# Patient Record
Sex: FEMALE | Race: WHITE | Employment: OTHER | URBAN - METROPOLITAN AREA
[De-identification: names, ages, dates, MRNs, and addresses within clinical notes are randomized per-mention and may not be internally consistent; named-entity substitution may affect disease eponyms.]

---

## 2019-07-13 ENCOUNTER — HOSPITAL ENCOUNTER (OUTPATIENT)
Age: 64
Discharge: HOME | End: 2019-07-13
Attending: FAMILY MEDICINE
Payer: MEDICARE

## 2019-07-13 VITALS
HEART RATE: 64 BPM | RESPIRATION RATE: 18 BRPM | WEIGHT: 130 LBS | SYSTOLIC BLOOD PRESSURE: 102 MMHG | DIASTOLIC BLOOD PRESSURE: 63 MMHG | HEIGHT: 64 IN | TEMPERATURE: 98 F | OXYGEN SATURATION: 100 % | BODY MASS INDEX: 22.2 KG/M2

## 2019-07-13 DIAGNOSIS — N30.01 ACUTE CYSTITIS WITH HEMATURIA: Primary | ICD-10-CM

## 2019-07-13 LAB
BILIRUB UR QL STRIP: NEGATIVE
COLOR UR: YELLOW
GLUCOSE UR STRIP-MCNC: NEGATIVE MG/DL
KETONES UR STRIP-MCNC: NEGATIVE MG/DL
NITRITE UR QL STRIP: POSITIVE
PH UR STRIP: 5.5 [PH]
PROT UR STRIP-MCNC: 100 MG/DL
SP GR UR STRIP: 1.01
UROBILINOGEN UR STRIP-ACNC: <2 MG/DL

## 2019-07-13 PROCEDURE — 87186 SC STD MICRODIL/AGAR DIL: CPT | Performed by: FAMILY MEDICINE

## 2019-07-13 PROCEDURE — 81002 URINALYSIS NONAUTO W/O SCOPE: CPT

## 2019-07-13 PROCEDURE — 87086 URINE CULTURE/COLONY COUNT: CPT | Performed by: FAMILY MEDICINE

## 2019-07-13 PROCEDURE — 87088 URINE BACTERIA CULTURE: CPT | Performed by: FAMILY MEDICINE

## 2019-07-13 PROCEDURE — 99204 OFFICE O/P NEW MOD 45 MIN: CPT

## 2019-07-13 RX ORDER — CEPHALEXIN 500 MG/1
500 CAPSULE ORAL 2 TIMES DAILY
Qty: 14 CAPSULE | Refills: 0 | Status: SHIPPED | OUTPATIENT
Start: 2019-07-13 | End: 2019-07-20

## 2019-07-13 RX ORDER — LEVOTHYROXINE SODIUM 137 UG/1
TABLET ORAL
Refills: 1 | COMMUNITY
Start: 2019-06-04 | End: 2019-10-29

## 2019-07-13 RX ORDER — AZELASTINE 1 MG/ML
SPRAY, METERED NASAL
Refills: 0 | COMMUNITY
Start: 2019-05-24 | End: 2021-05-18 | Stop reason: ALTCHOICE

## 2019-07-13 RX ORDER — KETOCONAZOLE 20 MG/ML
SHAMPOO TOPICAL
Refills: 0 | COMMUNITY
Start: 2019-06-07 | End: 2019-08-12

## 2019-07-13 RX ORDER — GABAPENTIN 100 MG/1
CAPSULE ORAL
Refills: 0 | COMMUNITY
Start: 2019-02-14 | End: 2020-06-09 | Stop reason: ALTCHOICE

## 2019-07-13 RX ORDER — ROPINIROLE 0.25 MG/1
TABLET, FILM COATED ORAL
Refills: 0 | COMMUNITY
Start: 2019-01-21 | End: 2020-06-09 | Stop reason: ALTCHOICE

## 2019-07-13 RX ORDER — CLOBETASOL PROPIONATE 0.5 MG/G
AEROSOL, FOAM TOPICAL
Refills: 0 | COMMUNITY
Start: 2019-03-26 | End: 2019-11-16

## 2019-07-13 RX ORDER — FLUOCINONIDE 0.5 MG/ML
SOLUTION TOPICAL
Refills: 0 | COMMUNITY
Start: 2019-06-21 | End: 2021-03-30

## 2019-07-13 RX ORDER — ESTRADIOL 10 UG/1
INSERT VAGINAL
Refills: 1 | COMMUNITY
Start: 2019-05-31 | End: 2020-06-09 | Stop reason: ALTCHOICE

## 2019-07-13 NOTE — ED PROVIDER NOTES
Patient Seen in: [address]    History   Patient presents with:  Urinary Symptoms (urologic)    Stated Complaint: Discomfort during urination and urine has different odor     HPI    59year old Female patient presents with Take 10 mg by mouth 3 (three) times daily. OxyCODONE HCl (ROXICODONE) 10 MG Oral Tab,  Take 10 mg by mouth every 4 (four) hours as needed for Pain. LORazepam (ATIVAN) 1 MG Oral Tab,  Take 1 mg by mouth every 4 (four) hours as needed for Anxiety.    onda Nitrite Urine Positive (*)     Leukocyte esterase urine Large (*)     All other components within normal limits     MDM         Started on Keflex 500 bidx 7 days.  Patient verbalizes agreement and understanding of the plan and management as well as warning

## 2019-07-13 NOTE — ED INITIAL ASSESSMENT (HPI)
Pt in IC by self c/o burning on urination and foul odor for 1-2 days. Denied fever, low back pain, pelvic pressure. Reports history of injury to pelvic floor, diagnosed with vaginal stricter.

## 2019-08-05 ENCOUNTER — HOSPITAL ENCOUNTER (OUTPATIENT)
Dept: MAMMOGRAPHY | Facility: HOSPITAL | Age: 64
Discharge: HOME | End: 2019-08-05
Attending: OBSTETRICS & GYNECOLOGY
Payer: MEDICARE

## 2019-08-05 ENCOUNTER — HOSPITAL ENCOUNTER (OUTPATIENT)
Dept: ULTRASOUND IMAGING | Facility: HOSPITAL | Age: 64
Discharge: HOME | End: 2019-08-05
Attending: OBSTETRICS & GYNECOLOGY
Payer: MEDICARE

## 2019-08-05 DIAGNOSIS — N64.4 MASTODYNIA: ICD-10-CM

## 2019-08-05 PROCEDURE — 77066 DX MAMMO INCL CAD BI: CPT | Performed by: OBSTETRICS & GYNECOLOGY

## 2019-08-05 PROCEDURE — 76642 ULTRASOUND BREAST LIMITED: CPT | Performed by: OBSTETRICS & GYNECOLOGY

## 2019-08-05 PROCEDURE — 77062 BREAST TOMOSYNTHESIS BI: CPT | Performed by: OBSTETRICS & GYNECOLOGY

## 2019-08-12 ENCOUNTER — OFFICE VISIT (OUTPATIENT)
Dept: INTERNAL MEDICINE CLINIC | Facility: CLINIC | Age: 64
End: 2019-08-12
Payer: MEDICARE

## 2019-08-12 VITALS
BODY MASS INDEX: 22.36 KG/M2 | DIASTOLIC BLOOD PRESSURE: 72 MMHG | SYSTOLIC BLOOD PRESSURE: 118 MMHG | WEIGHT: 131 LBS | HEIGHT: 64 IN | RESPIRATION RATE: 16 BRPM

## 2019-08-12 DIAGNOSIS — M54.41 CHRONIC RIGHT-SIDED LOW BACK PAIN WITH RIGHT-SIDED SCIATICA: ICD-10-CM

## 2019-08-12 DIAGNOSIS — G89.29 CHRONIC RIGHT-SIDED LOW BACK PAIN WITH RIGHT-SIDED SCIATICA: ICD-10-CM

## 2019-08-12 DIAGNOSIS — G50.0 TRIGEMINAL NEURALGIA: ICD-10-CM

## 2019-08-12 DIAGNOSIS — E03.9 ACQUIRED HYPOTHYROIDISM: Primary | ICD-10-CM

## 2019-08-12 PROCEDURE — G0463 HOSPITAL OUTPT CLINIC VISIT: HCPCS | Performed by: INTERNAL MEDICINE

## 2019-08-12 PROCEDURE — 99204 OFFICE O/P NEW MOD 45 MIN: CPT | Performed by: INTERNAL MEDICINE

## 2019-08-12 RX ORDER — ACETAMINOPHEN 160 MG
2 TABLET,DISINTEGRATING ORAL DAILY
COMMUNITY

## 2019-08-12 RX ORDER — LEVOTHYROXINE SODIUM 0.12 MG/1
125 TABLET ORAL
Qty: 90 TABLET | Refills: 1 | Status: SHIPPED | OUTPATIENT
Start: 2019-08-12 | End: 2019-10-29

## 2019-08-12 RX ORDER — SUMATRIPTAN 5 MG/1
SPRAY NASAL EVERY 2 HOUR PRN
COMMUNITY
End: 2020-01-08

## 2019-08-12 RX ORDER — MELOXICAM 15 MG/1
15 TABLET ORAL DAILY
COMMUNITY
End: 2020-06-09 | Stop reason: ALTCHOICE

## 2019-08-12 RX ORDER — ESTRADIOL AND NORETHINDRONE ACETATE .5; .1 MG/1; MG/1
1 TABLET ORAL DAILY
COMMUNITY
End: 2020-07-02

## 2019-08-12 RX ORDER — KETOCONAZOLE 20 MG/ML
10 SHAMPOO TOPICAL WEEKLY
Qty: 120 ML | Refills: 3 | Status: SHIPPED | OUTPATIENT
Start: 2019-08-12 | End: 2020-04-06

## 2019-08-12 RX ORDER — NADOLOL 40 MG/1
40 TABLET ORAL DAILY
COMMUNITY
End: 2020-06-09 | Stop reason: ALTCHOICE

## 2019-08-12 RX ORDER — IPRATROPIUM BROMIDE 42 UG/1
2 SPRAY, METERED NASAL 4 TIMES DAILY
COMMUNITY
End: 2020-04-22

## 2019-08-12 NOTE — PROGRESS NOTES
Kimberly Solis is a 59year old female. HPI:     1. Low Back pain    Had injury to back and had steroid injections. Which did not do much to help the pain she was experiencing. Has been seeing PT for 3 months and is feeling some better.  Has had several mouth 2 (two) times daily as needed for Heartburn. take 1 tablet (40MG)  by oral route 2 times every day Disp: 60 tablet Rfl: 11   metRONIDAZOLE (METROCREAM) 0.75 % Apply Externally Cream Apply  topically 2 (two) times daily.  Disp:  Rfl:    baclofen (Tess Yoo heartburn  NEURO: denies headaches    EXAM:   /72 (BP Location: Left arm, Patient Position: Sitting, Cuff Size: adult)   Resp 16   Ht 5' 4\" (1.626 m)   Wt 131 lb (59.4 kg)   BMI 22.49 kg/m²   GENERAL: well developed, well nourished,in no apparent di

## 2019-08-22 ENCOUNTER — MED REC SCAN ONLY (OUTPATIENT)
Dept: INTERNAL MEDICINE CLINIC | Facility: CLINIC | Age: 64
End: 2019-08-22

## 2019-10-15 ENCOUNTER — TELEPHONE (OUTPATIENT)
Dept: INTERNAL MEDICINE CLINIC | Facility: CLINIC | Age: 64
End: 2019-10-15

## 2019-10-15 DIAGNOSIS — E03.9 ACQUIRED HYPOTHYROIDISM: Primary | ICD-10-CM

## 2019-10-15 RX ORDER — LORAZEPAM 1 MG/1
1 TABLET ORAL 3 TIMES DAILY
Qty: 90 TABLET | Refills: 0 | Status: SHIPPED | OUTPATIENT
Start: 2019-10-15 | End: 2019-11-26

## 2019-10-15 NOTE — TELEPHONE ENCOUNTER
Patient is requesting a refill of her medication LORazepam (ATIVAN) 1 MG Oral Tab. Patient states no longer has any tablets left and the medication was prescribed by her past PCP Dr. Philippe Manriquez.  Patient states she is currently taking Diazepam and only ha

## 2019-10-15 NOTE — TELEPHONE ENCOUNTER
Action Requested: Summary for Provider     []  Critical Lab, Recommendations Needed  [] Need Additional Advice  []   FYI    [x]   Need Orders  [] Need Medications Sent to Pharmacy  []  Other     SUMMARY: Dr Malorie Paulino, please see pending order TSH w Reflex.

## 2019-10-28 ENCOUNTER — APPOINTMENT (OUTPATIENT)
Dept: LAB | Facility: HOSPITAL | Age: 64
End: 2019-10-28
Attending: PHYSICIAN ASSISTANT
Payer: MEDICARE

## 2019-10-28 DIAGNOSIS — E03.9 ACQUIRED HYPOTHYROIDISM: ICD-10-CM

## 2019-10-28 PROCEDURE — 36415 COLL VENOUS BLD VENIPUNCTURE: CPT

## 2019-10-28 PROCEDURE — 84443 ASSAY THYROID STIM HORMONE: CPT

## 2019-10-29 ENCOUNTER — OFFICE VISIT (OUTPATIENT)
Dept: INTERNAL MEDICINE CLINIC | Facility: CLINIC | Age: 64
End: 2019-10-29
Payer: MEDICARE

## 2019-10-29 VITALS
BODY MASS INDEX: 22.88 KG/M2 | RESPIRATION RATE: 16 BRPM | SYSTOLIC BLOOD PRESSURE: 132 MMHG | DIASTOLIC BLOOD PRESSURE: 73 MMHG | HEIGHT: 64 IN | HEART RATE: 99 BPM | WEIGHT: 134 LBS

## 2019-10-29 DIAGNOSIS — E03.9 ACQUIRED HYPOTHYROIDISM: ICD-10-CM

## 2019-10-29 DIAGNOSIS — G50.0 TRIGEMINAL NEURALGIA: ICD-10-CM
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
## 2019-10-29 DIAGNOSIS — Z23 NEED FOR VACCINATION: ICD-10-CM

## 2019-10-29 PROCEDURE — 99214 OFFICE O/P EST MOD 30 MIN: CPT | Performed by: INTERNAL MEDICINE

## 2019-10-29 PROCEDURE — G0008 ADMIN INFLUENZA VIRUS VAC: HCPCS | Performed by: INTERNAL MEDICINE

## 2019-10-29 PROCEDURE — 90686 IIV4 VACC NO PRSV 0.5 ML IM: CPT | Performed by: INTERNAL MEDICINE

## 2019-10-29 PROCEDURE — G0463 HOSPITAL OUTPT CLINIC VISIT: HCPCS | Performed by: INTERNAL MEDICINE

## 2019-10-29 RX ORDER — LEVOTHYROXINE SODIUM 0.1 MG/1
100 TABLET ORAL
Qty: 90 TABLET | Refills: 3 | Status: SHIPPED | OUTPATIENT
Start: 2019-10-29 | End: 2020-10-09

## 2019-10-29 RX ORDER — LEVOTHYROXINE SODIUM 0.1 MG/1
100 TABLET ORAL
COMMUNITY
End: 2019-10-29
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
## 2019-11-15 NOTE — TELEPHONE ENCOUNTER
Tried calling patient, but voice mail full-cannot leave any messages,. No MyChart. Called Kayla  (friend-see demographic) and told her if she can call patient and let her know to call us back, as voice mail is full.

## 2019-11-15 NOTE — TELEPHONE ENCOUNTER
Please triage symptoms associated with this refill as Last RF was 2018. Review pended refill request as it does not fall under a protocol.     Last Rx: 01/24/18  LOV: 10/29/19

## 2019-11-15 NOTE — TELEPHONE ENCOUNTER
Spoke with patient for clarification of medication request:    Patient states she has psoriasis on scalp and nails--does use both Fluocinonide solution and Clobetasol foam, depending on the weather.  She does understand her insurance prefers she use Fluocin

## 2019-11-16 RX ORDER — CLOBETASOL PROPIONATE 0.5 MG/G
AEROSOL, FOAM TOPICAL
Qty: 150 G | Refills: 2 | Status: SHIPPED | OUTPATIENT
Start: 2019-11-16 | End: 2020-12-03

## 2019-11-26 RX ORDER — LORAZEPAM 1 MG/1
1 TABLET ORAL 3 TIMES DAILY
Qty: 90 TABLET | Refills: 0 | Status: SHIPPED | OUTPATIENT
Start: 2019-11-26 | End: 2020-01-08

## 2019-11-26 NOTE — TELEPHONE ENCOUNTER
Controlled medication pending for review. Please change to phone in, fax, or print script if not being sent electronically.     Last Rx:10/15/19  LOV: Recent Visits  Date Type Provider Dept   10/29/19 Office Visit Zenaida Marcum MD [facility]

## 2019-11-26 NOTE — TELEPHONE ENCOUNTER
Per pt she has already phoned the pharmacy for her medication request. Pt is out of medication and would like a refill on lorazepam. Pharmacy: [facility].      Current Outpatient Medications   Medication Sig Dispense Refill   • LORazepam

## 2019-11-29 ENCOUNTER — TELEPHONE (OUTPATIENT)
Dept: INTERNAL MEDICINE CLINIC | Facility: CLINIC | Age: 64
End: 2019-11-29

## 2019-11-29 NOTE — TELEPHONE ENCOUNTER
[address] office spoke Otis Guajardo to check if hard script for lorazepam is available. Delma Davis office staff states office did not receive hard script & shows was sent to pharmacy on file. [address] [facility] spoke with Rabia William who states prescription is still pended. Denies receiving hard script. Lorazepam has been approved as printed script into [facility] by this RN and it will be ready for . Spoke with pt at 11:03 am states she never received paper script for lorazepam. Informed pt that prescription will be ready for  (confirmed pharmacy on file).

## 2019-11-29 NOTE — TELEPHONE ENCOUNTER
PA for Clobetasol Propionate 0.05% foam completed with [id number] via CMM response time 3-5 business days KEY [id number].

## 2019-11-29 NOTE — TELEPHONE ENCOUNTER
received call from [employer]. Patient states on Wednesday when a refill request for Lorazepam was requested no one told her that it would not be called into Pharmacy. She states she was not advised that she would have to  a hard copy script for medicine. Patient states she does not have enough medicine to wait until Dr. Rochelle Montiel is back in on Monday because she will go through withdraw. Please advise. Please call patient - unavailable from 10:00 am - 11:00 am by telephone.

## 2019-11-29 NOTE — TELEPHONE ENCOUNTER
Patient is requesting a refill for her Lorazepam.      Current Outpatient Medications   Medication Sig Dispense Refill   • LORazepam 1 MG Oral Tab Take 1 tablet (1 mg total) by mouth 3 (three) times daily.  As needed for anxiety 90 tablet 0   • CLOBETASOL P

## 2019-12-04 NOTE — TELEPHONE ENCOUNTER
Spoke to [facility] at [facility], medication has not been picked up. [facility] will process medication for patient, will have it ready for .      Spoke to patient, informed of approval.    Outcome   Approved on November 29   Details of this decision are pro

## 2019-12-12 ENCOUNTER — OFFICE VISIT (OUTPATIENT)
Dept: INTERNAL MEDICINE CLINIC | Facility: CLINIC | Age: 64
End: 2019-12-12
Payer: MEDICARE

## 2019-12-12 VITALS
DIASTOLIC BLOOD PRESSURE: 85 MMHG | HEART RATE: 82 BPM | WEIGHT: 131 LBS | RESPIRATION RATE: 16 BRPM | SYSTOLIC BLOOD PRESSURE: 122 MMHG | BODY MASS INDEX: 22.36 KG/M2 | HEIGHT: 64 IN

## 2019-12-12 DIAGNOSIS — N30.10 INTERSTITIAL CYSTITIS: ICD-10-CM

## 2019-12-12 DIAGNOSIS — M35.09 SJOGREN'S SYNDROME WITH OTHER ORGAN INVOLVEMENT (HCC): ICD-10-CM

## 2019-12-12 DIAGNOSIS — E03.9 ACQUIRED HYPOTHYROIDISM: ICD-10-CM

## 2019-12-12 DIAGNOSIS — Z00.00 PHYSICAL EXAM, ANNUAL: Primary | ICD-10-CM

## 2019-12-12 DIAGNOSIS — M32.9 LUPUS (HCC): ICD-10-CM

## 2019-12-12 DIAGNOSIS — Z23 NEED FOR VACCINATION: ICD-10-CM

## 2019-12-12 PROBLEM — M35.00 SJOGREN'S DISEASE (HCC): Status: ACTIVE | Noted: 2019-12-12

## 2019-12-12 PROBLEM — IMO0002 LUPUS: Status: ACTIVE | Noted: 2019-12-12

## 2019-12-12 PROCEDURE — G0439 PPPS, SUBSEQ VISIT: HCPCS | Performed by: INTERNAL MEDICINE

## 2019-12-12 PROCEDURE — 90670 PCV13 VACCINE IM: CPT | Performed by: INTERNAL MEDICINE

## 2019-12-12 PROCEDURE — G0009 ADMIN PNEUMOCOCCAL VACCINE: HCPCS | Performed by: INTERNAL MEDICINE

## 2019-12-12 RX ORDER — FOLIC ACID 1 MG/1
1 TABLET ORAL
COMMUNITY
Start: 2019-11-18 | End: 2021-05-18 | Stop reason: ALTCHOICE

## 2019-12-12 RX ORDER — TIZANIDINE 4 MG/1
4 TABLET ORAL EVERY 6 HOURS PRN
COMMUNITY
End: 2020-07-10

## 2019-12-12 NOTE — PROGRESS NOTES
REASON FOR VISIT:    Tyler Chaves is a 62year old female who presents for a Medicare Annual Wellness visit.      Patient Care Team: Patient Care Team:  Carmen Rodriguez MD as PCP - General (Internal Medicine)    Patient Active Problem List:     Dione Rivera topically 2 (two) times daily. • OxyCODONE HCl (ROXICODONE) 10 MG Oral Tab Take 10 mg by mouth every 4 (four) hours as needed for Pain.      • ondansetron (ZOFRAN-ODT) 4 MG Oral Tablet Dispersible Take 4 mg by mouth every 8 (eight) hours as needed for N medications as prescribed: Able without help  Are you able to afford your medications?: Yes  Hearing Problems?: No     Functional Status     Hearing Problems?: No  Vision Problems? : No  Difficulty walking?: Yes  Difficulty dressing or bathing?: Yes  Probl 09/23/2014 82      Cardiovascular Disease Screening    LDL Annually LDL Cholesterol (mg/dL)   Date Value   09/23/2014 90       EKG - w/ Initial Preventative Physical Exam only, or if medically necessary    Colorectal Cancer Screening     Colonoscopy Scre Brother         alcoholism   • Depression Other         family h/o     Immunization History      Immunization History  Administered            Date(s) Administered    FLULAVAL 6 months & older 0.5 ml Prefilled syringe ([id number])                          10/29 deferred  MUSCULOSKELETAL: back is not tender, FROM of the back  EXTREMITIES: no cyanosis, clubbing or edema  NEURO: Oriented times three, cranial nerves are intact, motor and sensory are grossly intact      ASSESSMENT AND OTHER RELEVANT CHRONIC CONDITIONS

## 2020-01-03 ENCOUNTER — TELEPHONE (OUTPATIENT)
Dept: INTERNAL MEDICINE CLINIC | Facility: CLINIC | Age: 65
End: 2020-01-03

## 2020-01-03 NOTE — TELEPHONE ENCOUNTER
Patient requesting phone call to discuss her Trigeminal neuralgia medication issues before making appointment. Provided e-mail just in case     [email or web address]

## 2020-01-03 NOTE — TELEPHONE ENCOUNTER
Spoke with patient at length. States she has been struggling with trigeminal neuralgia since 2014. She is seeking a doctor who will prescribe opiates to help with break-through pain.  States she has seen multiple pain management doctors who will prescribe o

## 2020-01-03 NOTE — TELEPHONE ENCOUNTER
Patient called back and is returning a call. Patient states she spoke with pain management and they stated they do not treat Trigeminal neuralgia or even prescribe medication for this type of pain. They only do anesthesia intervention.  Patient wanting to s

## 2020-01-03 NOTE — TELEPHONE ENCOUNTER
I tried calling pt, no answer and does not use [employer].  Please try calling her again to see what she needs regarding her trigeminal neuralgia thanks

## 2020-01-07 NOTE — TELEPHONE ENCOUNTER
Followed up with patient, advised of treatment recommendations. She may consider going to pain clinic, will call back for number if she moves forward with this.  Her main concern is in the past has seen pain management providers but they did not help with l

## 2020-01-07 NOTE — TELEPHONE ENCOUNTER
Please let pt know [facility] is not aware of any specific providers who treat this. She may look out of [facility]. We also have the integrative medicine clinic in [place] she can try to see if they have treatments for this.

## 2020-01-07 NOTE — TELEPHONE ENCOUNTER
Please call pt and let her know that Dr. Bert Rowland will not manage long term pain and this is best deferred to pain management.

## 2020-01-07 NOTE — TELEPHONE ENCOUNTER
Followed up with patient, advised of treatment recommendations. Patient reports has decided to move forward with seeing [facility], was recommended in the past also by other providers.

## 2020-01-08 ENCOUNTER — PATIENT MESSAGE (OUTPATIENT)
Dept: INTERNAL MEDICINE CLINIC | Facility: CLINIC | Age: 65
End: 2020-01-08

## 2020-01-08 RX ORDER — SUMATRIPTAN 5 MG/1
1 SPRAY NASAL EVERY 2 HOUR PRN
Qty: 1 EACH | Refills: 1 | Status: SHIPPED | OUTPATIENT
Start: 2020-01-08 | End: 2020-06-05

## 2020-01-08 RX ORDER — LORAZEPAM 1 MG/1
1 TABLET ORAL 3 TIMES DAILY
Qty: 90 TABLET | Refills: 1 | Status: SHIPPED | OUTPATIENT
Start: 2020-01-08 | End: 2020-03-15

## 2020-01-08 NOTE — TELEPHONE ENCOUNTER
Gwen,  Can you do the refill as requested? Med pended for your review and approval     Romelia Angelucci, MD 2 hours ago (2:28 PM)         Thank you so much.    I would appreciate if Dr. Johnathan Michelle could order refills for the Imitre

## 2020-01-08 NOTE — TELEPHONE ENCOUNTER
Controlled medication pending for review. Please change to phone in, fax, or print script if not being sent electronically.     Last Rx: 11-26-19 # 90  LOV: 12-12-19      Requested Prescriptions     Pending Prescriptions Disp Refills   • LORazepam 1 MG O

## 2020-01-08 NOTE — TELEPHONE ENCOUNTER
From: Heriberto Banks  To: Mitzy Ortiz MD  Sent: 1/8/2020 7:14 AM CST  Subject: Prescription Question    Saw Dr. Susan Delgado at [facility] for L migraines. New RX, imitrex nasal spray has been effective for this, so I can avoid the injectable form.  Will

## 2020-03-16 RX ORDER — LORAZEPAM 1 MG/1
TABLET ORAL
Qty: 90 TABLET | Refills: 1 | Status: SHIPPED | OUTPATIENT
Start: 2020-03-16 | End: 2020-06-18

## 2020-03-16 RX ORDER — FAMOTIDINE 40 MG/1
40 TABLET, FILM COATED ORAL 2 TIMES DAILY PRN
Qty: 180 TABLET | Refills: 1 | OUTPATIENT
Start: 2020-03-16

## 2020-03-16 NOTE — TELEPHONE ENCOUNTER
Controlled medication pending for review. Please change to phone in, fax, or print script if not being sent electronically.     Last Rx: 1-8-20 # 80 + 1  Last Office Visit with PCP: 12/12/2019      Requested Prescriptions     Pending Prescriptions Disp Ref

## 2020-04-06 RX ORDER — KETOCONAZOLE 20 MG/ML
SHAMPOO TOPICAL
Qty: 720 ML | Refills: 0 | Status: SHIPPED | OUTPATIENT
Start: 2020-04-06 | End: 2020-08-24

## 2020-04-20 ENCOUNTER — VIRTUAL PHONE E/M (OUTPATIENT)
Dept: INTERNAL MEDICINE CLINIC | Facility: CLINIC | Age: 65
End: 2020-04-20
Payer: MEDICARE

## 2020-04-20 ENCOUNTER — NURSE TRIAGE (OUTPATIENT)
Dept: INTERNAL MEDICINE CLINIC | Facility: CLINIC | Age: 65
End: 2020-04-20

## 2020-04-20 DIAGNOSIS — R21 RASH: ICD-10-CM

## 2020-04-20 DIAGNOSIS — N30.00 ACUTE CYSTITIS WITHOUT HEMATURIA: Primary | ICD-10-CM

## 2020-04-20 PROCEDURE — 99442 PHONE E/M BY PHYS 11-20 MIN: CPT | Performed by: INTERNAL MEDICINE

## 2020-04-20 NOTE — PROGRESS NOTES
Patient ID: Kimberly Solis is a 62year old female. No chief complaint on file. Virtual/Telephone Check-In    Kimberly Solis verbally consents to a Virtual/Telephone Check-In service on 04/20/20.  Patient understands and accepts financial responsib 100 MCG Oral Tab, Take 1 tablet (100 mcg total) by mouth before breakfast., Disp: 90 tablet, Rfl: 3  •  Estradiol-Norethindrone Acet (LOPREEZA) 0.5-0.1 MG Oral Tab, Take 1 tablet by mouth daily. , Disp: , Rfl:   •  Meloxicam 15 MG Oral Tab, Take 15 mg by mo History      Not on file    Social Needs      Financial resource strain: Not on file      Food insecurity:        Worry: Not on file        Inability: Not on file      Transportation needs:        Medical: Not on file        Non-medical: Not on file    Tob have not resolved after all meds have been taken. Plan is to start  for  days. Instructions given on increasing fluid intake, bladder emptying after intercourse.     2. Rash    Has a rash that comes and goes started out behind the knees and then seems to b

## 2020-04-20 NOTE — TELEPHONE ENCOUNTER
Action Requested: Summary for Provider     []  Critical Lab, Recommendations Needed  [x] Need Additional Advice  []   FYI    []   Need Orders  [] Need Medications Sent to Pharmacy  []  Other     SUMMARY: per patient she recently was treated for UTI by  system protocol please contact patient to determine if they should be treated over the phone by you or requires other testing. Please refer to the \"Testing and Travel Recommendations\" provided by the health system leadership.     Best contact number for

## 2020-04-21 ENCOUNTER — APPOINTMENT (OUTPATIENT)
Dept: LAB | Facility: HOSPITAL | Age: 65
End: 2020-04-21
Attending: INTERNAL MEDICINE
Payer: MEDICARE

## 2020-04-21 ENCOUNTER — TELEPHONE (OUTPATIENT)
Dept: RHEUMATOLOGY | Facility: CLINIC | Age: 65
End: 2020-04-21

## 2020-04-21 PROCEDURE — 81001 URINALYSIS AUTO W/SCOPE: CPT | Performed by: INTERNAL MEDICINE

## 2020-04-21 NOTE — TELEPHONE ENCOUNTER
Going through her history, its seems that her autoimmune disease is questionable, I see that she has only had a +TIRSO and other blood work for autoimmune diseases were negative.  Would need to see if blood work is needed during her visit  Based on her sympto

## 2020-04-21 NOTE — TELEPHONE ENCOUNTER
Left message for pt to call back. Dr. Tara Sutherland will decide at consult appointment if labs need to be ordered based on evaluation of symptoms discussed with patient.

## 2020-04-21 NOTE — TELEPHONE ENCOUNTER
Please see pt's message below. Pt did have some labs completed in [facility] but they are not recent. See yesterday's encounter with PCP. Please advise.

## 2020-04-22 RX ORDER — IPRATROPIUM BROMIDE 42 UG/1
SPRAY, METERED NASAL
Qty: 45 ML | Refills: 0 | Status: SHIPPED | OUTPATIENT
Start: 2020-04-22 | End: 2020-06-22

## 2020-04-22 NOTE — PROGRESS NOTES
Patient viewed message .      Viewed by Abi Beltre on 4/22/2020 10:58 AM   Written by Tru Avila MD on 4/22/2020  9:35 AM   Zelpfelice Stable, there is no evidence of a urinary tract infection in your urine currently that was done the other day.  No whit

## 2020-05-01 ENCOUNTER — OFFICE VISIT (OUTPATIENT)
Dept: RHEUMATOLOGY | Facility: CLINIC | Age: 65
End: 2020-05-01
Payer: MEDICARE

## 2020-05-01 ENCOUNTER — LAB ENCOUNTER (OUTPATIENT)
Dept: LAB | Facility: HOSPITAL | Age: 65
End: 2020-05-01
Attending: NURSE PRACTITIONER
Payer: MEDICARE

## 2020-05-01 VITALS
HEIGHT: 64 IN | DIASTOLIC BLOOD PRESSURE: 71 MMHG | SYSTOLIC BLOOD PRESSURE: 109 MMHG | BODY MASS INDEX: 22.53 KG/M2 | WEIGHT: 132 LBS | HEART RATE: 93 BPM

## 2020-05-01 DIAGNOSIS — R76.8 POSITIVE ANA (ANTINUCLEAR ANTIBODY): ICD-10-CM

## 2020-05-01 DIAGNOSIS — M25.50 POLYARTHRALGIA: ICD-10-CM

## 2020-05-01 DIAGNOSIS — M25.512 ACUTE PAIN OF LEFT SHOULDER: ICD-10-CM

## 2020-05-01 DIAGNOSIS — R76.8 POSITIVE ANA (ANTINUCLEAR ANTIBODY): Primary | ICD-10-CM

## 2020-05-01 DIAGNOSIS — E03.8 OTHER SPECIFIED HYPOTHYROIDISM: ICD-10-CM

## 2020-05-01 PROCEDURE — 86235 NUCLEAR ANTIGEN ANTIBODY: CPT

## 2020-05-01 PROCEDURE — 84439 ASSAY OF FREE THYROXINE: CPT

## 2020-05-01 PROCEDURE — G0463 HOSPITAL OUTPT CLINIC VISIT: HCPCS | Performed by: INTERNAL MEDICINE

## 2020-05-01 PROCEDURE — 86039 ANTINUCLEAR ANTIBODIES (ANA): CPT

## 2020-05-01 PROCEDURE — 86140 C-REACTIVE PROTEIN: CPT | Performed by: INTERNAL MEDICINE

## 2020-05-01 PROCEDURE — 86160 COMPLEMENT ANTIGEN: CPT | Performed by: INTERNAL MEDICINE

## 2020-05-01 PROCEDURE — 85652 RBC SED RATE AUTOMATED: CPT | Performed by: INTERNAL MEDICINE

## 2020-05-01 PROCEDURE — 86038 ANTINUCLEAR ANTIBODIES: CPT

## 2020-05-01 PROCEDURE — 86225 DNA ANTIBODY NATIVE: CPT

## 2020-05-01 PROCEDURE — 99204 OFFICE O/P NEW MOD 45 MIN: CPT | Performed by: INTERNAL MEDICINE

## 2020-05-01 PROCEDURE — 36415 COLL VENOUS BLD VENIPUNCTURE: CPT | Performed by: INTERNAL MEDICINE

## 2020-05-01 PROCEDURE — 84443 ASSAY THYROID STIM HORMONE: CPT

## 2020-05-01 RX ORDER — METHYLPREDNISOLONE 4 MG/1
TABLET ORAL
Qty: 1 PACKAGE | Refills: 0 | Status: SHIPPED | OUTPATIENT
Start: 2020-05-01 | End: 2020-06-09 | Stop reason: ALTCHOICE

## 2020-05-01 RX ORDER — TRIAMCINOLONE ACETONIDE OINTMENT USP, 0.05% 0.5 MG/G
OINTMENT TOPICAL
Qty: 1 EACH | Refills: 0 | Status: SHIPPED | OUTPATIENT
Start: 2020-05-01 | End: 2021-08-09

## 2020-05-01 NOTE — PROGRESS NOTES
Juvenal Marinelli is a 62year old female who presents for No chief complaint on file. Courtney Niteo    HPI:   CC: joint pain  Consult: referred by PCP Dr. Kehinde Hannon  Previous rheumatologist: Dr. Prince Miller [facility])    This is a 61 yo F with history of Hypothyroid, A - She also developed a rash behind her legs that extended throughout her body. They were small macular scaly lesions. They are nonpruritic. She had an appoint with dermatology but due to the coronavirus epidemic it had to be canceled.     Denies any oral • Vitamin D3 2000 units Oral Cap Take 2 Units by mouth daily. Take 2 tablets daily     • nadolol 40 MG Oral Tab Take 40 mg by mouth daily.      • Azelastine HCl 0.1 % Nasal Solution   0   • Estradiol 10 MCG Vaginal Tab   1   • Fluocinonide 0.05 % External S • Fibromyalgia Sister    • Alcohol and Other Disorders Associated Brother         alcoholism   • Depression Other         family h/o   • Other (drug use) Brother       Social History:  Social History    Tobacco Use      Smoking status: Never Smoker    Alco Knees: FROM, no warmth or effusion present. No pain with ROM.    Ankles: FROM, no pain or swelling or warmth on palpation  Feet: no pain with MTP squeeze, no toe swelling or pain or warmth on palpation with FROM  Spine: no lumbar or sacral pain on palpation L shoulder pain likely secondary to tendinitis  - She recently worked out and overstretched her left shoulder causing some pain. She also reports some pain in her bilateral elbows.   Prescribed Medrol Dosepak to help her symptoms  - If her symptoms do not

## 2020-05-01 NOTE — PATIENT INSTRUCTIONS
You were seen today for joint pain and rash  Now I believe your diagnosis of lupus is questionable  Lets get some more blood work to further evaluate  Will give you medrol dose pack for your shoulder, if it doesn't help then lets get US of R shoulder  Will

## 2020-05-02 ENCOUNTER — E-VISIT (OUTPATIENT)
Dept: FAMILY MEDICINE CLINIC | Facility: CLINIC | Age: 65
End: 2020-05-02

## 2020-05-02 DIAGNOSIS — Z20.822 SUSPECTED COVID-19 VIRUS INFECTION: Primary | ICD-10-CM

## 2020-05-02 NOTE — PROGRESS NOTES
Rosaura Cabot is a 62year old female. HPI:   See answers to questions above. Current Outpatient Medications   Medication Sig Dispense Refill   • methylPREDNISolone 4 MG Oral Tablet Therapy Pack Take as directed on package.  1 Package 0   • Triamfloryo Externally Cream Apply  topically 2 (two) times daily. • baclofen (LIORESAL) 10 MG Oral Tab Take 10 mg by mouth 3 (three) times daily. • OxyCODONE HCl (ROXICODONE) 10 MG Oral Tab Take 10 mg by mouth every 4 (four) hours as needed for Pain.      • on recent hx of cough: about 3 days, diarrhea, unknown temperature elevation, change in sensation of smell, and sore throat. Denies any chills at this time.  Will order testing, pt aware that she will need to go to a drive-thru testing facility and will need t

## 2020-05-02 NOTE — PATIENT INSTRUCTIONS
Coronavirus Disease 2019 (COVID-19)   Advice: Those with mild symptoms, regardless of test results, are presumed to have a mild case of COVID and should stay home for 7 days from symptom onset and until they are at least 72 hours fever free.    Those who Cover your coughs and sneezes   Cover your mouth and nose with a tissue when you cough or sneeze.  Throw used tissues in a lined trash can; immediately wash your hands with soap and water for at least 20 seconds or clean your hands with an alcohol-based jiménez Seek prompt medical attention if your illness is worsening (e.g., difficulty breathing). Before seeking care, call your healthcare provider and tell them that you have, or are being evaluated for, COVID-19. Put on a facemask before you enter the facility. Wash your hands often with soap and water for at least 20 seconds or clean your hands with an alcohol-based hand  that contains at least 60% alcohol. As much as possible, stay in a specific room and away from other people in your home.  Also, you 1. Stay home from work, school, and away from other public places. If you must go out, avoid using any kind of public transportation, ridesharing, or taxis. 2. Monitor your symptoms carefully.  If your symptoms get worse, call your healthcare provider imm If you have been tested and are positive for COVID -19, and your symptoms worsen at home with symptoms such as: extreme weakness, difficult breathing, or unrelenting fevers greater than 100.4 degrees Fahrenheit, you should contact your health care provider [email or web address]  https://www.cdc.gov/coronavirus/2019-ncov/

## 2020-05-04 ENCOUNTER — LAB ENCOUNTER (OUTPATIENT)
Dept: LAB | Facility: HOSPITAL | Age: 65
End: 2020-05-04
Attending: NURSE PRACTITIONER
Payer: MEDICARE

## 2020-05-04 DIAGNOSIS — Z20.822 SUSPECTED COVID-19 VIRUS INFECTION: ICD-10-CM

## 2020-05-22 ENCOUNTER — TELEPHONE (OUTPATIENT)
Dept: INTERNAL MEDICINE CLINIC | Facility: CLINIC | Age: 65
End: 2020-05-22

## 2020-05-22 RX ORDER — MOMETASONE FUROATE 50 UG/1
1 SPRAY, METERED NASAL DAILY
Qty: 3 BOTTLE | Refills: 0 | Status: SHIPPED | OUTPATIENT
Start: 2020-05-22 | End: 2020-09-22

## 2020-05-22 NOTE — TELEPHONE ENCOUNTER
Patient, states that this will be the first time the doctor will be prescribing this medication to her. This was prescribed by her previous pcp for her seasonal allergies. Pt would like a refill on her mometasone furoate nasal spray 50 micrograms. 1 to 2 sprays in each nostril twice a day. Medication was not listed. Per pt she is out of medication. Pt is requesting a 3 month supply. Pharmacy: [facility] (listed)    Forward to Dr. Francis Burnham, Dr. Dallas Newton not in the office today.

## 2020-06-05 ENCOUNTER — TELEPHONE (OUTPATIENT)
Dept: INTERNAL MEDICINE CLINIC | Facility: CLINIC | Age: 65
End: 2020-06-05

## 2020-06-05 RX ORDER — SUMATRIPTAN 5 MG/1
1 SPRAY NASAL EVERY 2 HOUR PRN
Qty: 1 EACH | Refills: 1 | Status: SHIPPED | OUTPATIENT
Start: 2020-06-05 | End: 2020-09-24

## 2020-06-05 NOTE — TELEPHONE ENCOUNTER
Patient called, requesting refill of the following medication.     sumatriptan (IMITREX) 5 MG/ACT Nasal Solution

## 2020-06-09 ENCOUNTER — HOSPITAL ENCOUNTER (OUTPATIENT)
Dept: GENERAL RADIOLOGY | Age: 65
Discharge: HOME | End: 2020-06-09
Attending: PODIATRIST
Payer: MEDICARE

## 2020-06-09 ENCOUNTER — OFFICE VISIT (OUTPATIENT)
Dept: PODIATRY CLINIC | Facility: CLINIC | Age: 65
End: 2020-06-09
Payer: MEDICARE

## 2020-06-09 DIAGNOSIS — M72.2 PLANTAR FASCIITIS OF RIGHT FOOT: Primary | ICD-10-CM

## 2020-06-09 DIAGNOSIS — M72.2 PLANTAR FASCIITIS OF RIGHT FOOT: ICD-10-CM

## 2020-06-09 DIAGNOSIS — M72.2 PLANTAR FASCIITIS OF LEFT FOOT: ICD-10-CM

## 2020-06-09 DIAGNOSIS — M76.821 TIBIALIS POSTERIOR TENDINITIS, RIGHT: ICD-10-CM

## 2020-06-09 DIAGNOSIS — M77.31 CALCANEAL SPUR OF RIGHT FOOT: ICD-10-CM

## 2020-06-09 PROCEDURE — 73620 X-RAY EXAM OF FOOT: CPT | Performed by: PODIATRIST

## 2020-06-09 PROCEDURE — 99203 OFFICE O/P NEW LOW 30 MIN: CPT | Performed by: PODIATRIST

## 2020-06-09 RX ORDER — SUMATRIPTAN 6 MG/.5ML
INJECTION, SOLUTION SUBCUTANEOUS
COMMUNITY
End: 2020-09-12

## 2020-06-09 RX ORDER — CYCLOBENZAPRINE HCL 10 MG
10 TABLET ORAL 3 TIMES DAILY PRN
COMMUNITY
End: 2020-07-10

## 2020-06-09 RX ORDER — ASCORBIC ACID 500 MG
TABLET ORAL
COMMUNITY
End: 2021-05-18 | Stop reason: ALTCHOICE

## 2020-06-09 NOTE — PROGRESS NOTES
Francis Holm is a 62year old female. Patient presents with:  Consult: right heel pain and bilateral ankle pain -- No xrays taken. States that ankles are worse. Hx of right foot surgery for neuroma and bone spur. and right bunionectomy.  Rates pain 3/10 route daily.  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   This

## 2020-06-18 RX ORDER — LORAZEPAM 1 MG/1
TABLET ORAL
Qty: 90 TABLET | Refills: 0 | Status: SHIPPED | OUTPATIENT
Start: 2020-06-18 | End: 2020-08-04

## 2020-06-22 RX ORDER — IPRATROPIUM BROMIDE 42 UG/1
SPRAY, METERED NASAL
Qty: 45 ML | Refills: 0 | Status: SHIPPED | OUTPATIENT
Start: 2020-06-22 | End: 2020-09-22

## 2020-07-02 RX ORDER — ESTRADIOL AND NORETHINDRONE ACETATE .5; .1 MG/1; MG/1
1 TABLET ORAL DAILY
Qty: 30 TABLET | Refills: 0 | Status: SHIPPED | OUTPATIENT
Start: 2020-07-02 | End: 2020-07-03

## 2020-07-02 NOTE — TELEPHONE ENCOUNTER
This is being sent to you without review by the Triage staff due to the high call volumes created by the COVID-19 virus, per the email sent by Dr. Laura Hameed on 3/19/20. Thank you for your support.     Centralized Nurse Triage Team

## 2020-07-02 NOTE — TELEPHONE ENCOUNTER
Fax received at the [place] office. Refill request on the following.     Current Outpatient Medications   Medication Sig Dispense Refill   •       •       •       •       •       •       •       •       •       •       •       •       •       •       • Estr

## 2020-07-03 RX ORDER — ESTRADIOL AND NORETHINDRONE ACETATE .5; .1 MG/1; MG/1
1 TABLET ORAL DAILY
Qty: 84 TABLET | Refills: 0 | Status: SHIPPED | OUTPATIENT
Start: 2020-07-03 | End: 2020-08-02

## 2020-07-08 ENCOUNTER — PATIENT MESSAGE (OUTPATIENT)
Dept: INTERNAL MEDICINE CLINIC | Facility: CLINIC | Age: 65
End: 2020-07-08

## 2020-07-08 NOTE — TELEPHONE ENCOUNTER
From: Tyler Chaves  To: Nubia Jaimes MD  Sent: 7/8/2020 9:43 AM CDT  Subject: Non-Urgent Medical Question    Hi again,   I have a Hiatal Hernia ( confirmed by endoscopy) and GERD. I take famotidine/Pepcid, 40mg in the evening.    With the lockdow

## 2020-07-09 ENCOUNTER — LAB ENCOUNTER (OUTPATIENT)
Dept: LAB | Facility: HOSPITAL | Age: 65
End: 2020-07-09
Attending: PHYSICIAN ASSISTANT
Payer: MEDICARE

## 2020-07-09 ENCOUNTER — PATIENT MESSAGE (OUTPATIENT)
Dept: INTERNAL MEDICINE CLINIC | Facility: CLINIC | Age: 65
End: 2020-07-09

## 2020-07-09 DIAGNOSIS — E03.8 SUBCLINICAL HYPOTHYROIDISM: ICD-10-CM

## 2020-07-09 LAB
T4 FREE SERPL-MCNC: 1 NG/DL (ref 0.8–1.7)
TSI SER-ACNC: 6.73 MIU/ML (ref 0.36–3.74)

## 2020-07-09 PROCEDURE — 84439 ASSAY OF FREE THYROXINE: CPT

## 2020-07-09 PROCEDURE — 36415 COLL VENOUS BLD VENIPUNCTURE: CPT

## 2020-07-09 PROCEDURE — 84443 ASSAY THYROID STIM HORMONE: CPT

## 2020-07-09 NOTE — TELEPHONE ENCOUNTER
To: Teresa Huggins      From: Graham Hooker RN      Created: 7/9/2020 11:53 AM        Manda Rivera, I see that Dr Sena Marx had advised you to make an appointment for the GERD.  For lab work, please call [phone] to schedule a time to get your lab

## 2020-07-09 NOTE — TELEPHONE ENCOUNTER
From: Sylwia Baldwin  To: Marixa Yeboah MD  Sent: 7/9/2020 12:23 AM CDT  Subject: Referral Request    Hello again,   I will make appt to see Dr. Michael Alex after I get in for TFT blood draw and results are available to review.  Then we can also discu

## 2020-07-10 ENCOUNTER — TELEPHONE (OUTPATIENT)
Dept: INTERNAL MEDICINE CLINIC | Facility: CLINIC | Age: 65
End: 2020-07-10

## 2020-07-10 ENCOUNTER — OFFICE VISIT (OUTPATIENT)
Dept: INTERNAL MEDICINE CLINIC | Facility: CLINIC | Age: 65
End: 2020-07-10
Payer: MEDICARE

## 2020-07-10 VITALS
DIASTOLIC BLOOD PRESSURE: 76 MMHG | SYSTOLIC BLOOD PRESSURE: 120 MMHG | BODY MASS INDEX: 22.85 KG/M2 | TEMPERATURE: 99 F | WEIGHT: 133.81 LBS | HEART RATE: 85 BPM | HEIGHT: 64 IN

## 2020-07-10 DIAGNOSIS — J34.2 DEVIATED SEPTUM: ICD-10-CM

## 2020-07-10 DIAGNOSIS — K21.9 GASTROESOPHAGEAL REFLUX DISEASE WITHOUT ESOPHAGITIS: ICD-10-CM

## 2020-07-10 DIAGNOSIS — E03.8 SUBCLINICAL HYPOTHYROIDISM: Primary | ICD-10-CM

## 2020-07-10 DIAGNOSIS — J34.89 SINUS PAIN: ICD-10-CM

## 2020-07-10 PROCEDURE — G0463 HOSPITAL OUTPT CLINIC VISIT: HCPCS | Performed by: PHYSICIAN ASSISTANT

## 2020-07-10 PROCEDURE — 99214 OFFICE O/P EST MOD 30 MIN: CPT | Performed by: PHYSICIAN ASSISTANT

## 2020-07-10 RX ORDER — OMEPRAZOLE 20 MG/1
CAPSULE, DELAYED RELEASE ORAL
Qty: 90 CAPSULE | Refills: 0 | Status: SHIPPED | OUTPATIENT
Start: 2020-07-10 | End: 2020-09-25

## 2020-07-10 RX ORDER — OMEPRAZOLE 20 MG/1
20 CAPSULE, DELAYED RELEASE ORAL
Qty: 30 CAPSULE | Refills: 0 | Status: SHIPPED | OUTPATIENT
Start: 2020-07-10 | End: 2020-07-10

## 2020-07-10 RX ORDER — METHYLPREDNISOLONE 4 MG/1
TABLET ORAL
Qty: 1 KIT | Refills: 0 | Status: SHIPPED | OUTPATIENT
Start: 2020-07-10 | End: 2021-05-18 | Stop reason: ALTCHOICE

## 2020-07-10 RX ORDER — LEVOTHYROXINE SODIUM 0.03 MG/1
25 TABLET ORAL
Qty: 90 TABLET | Refills: 0 | Status: SHIPPED | OUTPATIENT
Start: 2020-07-10 | End: 2020-10-11

## 2020-07-10 NOTE — TELEPHONE ENCOUNTER
Had blood test for thryroid yesterday. Sinus headaches, Worsening of GERD. [facility]. Patients availability was limited with  schedule. Patient asked to get into someone sooner.   Angela

## 2020-07-10 NOTE — PROGRESS NOTES
HPI:    Patient ID: Abi Beltre is a 62year old female. HPI  Pt presents for follow up of GERD, hypothyroidism and sinus pain. Hx of lupus and schrogens syndrome. Doing well, seeing rheumatology. Currently taking 100mg of levothyroxine.  Is on 40mg Shampoo APPLY 10 ML EXTERNALLY 1 TIME A WEEK 333 mL 0   • folic acid 1 MG Oral Tab Take 1 mg by mouth.      • CLOBETASOL PROPIONATE 0.05 % External Foam APPLY 1 GRAM ON THE SKIN AS DIRECTED 150 g 2   • Levothyroxine Sodium 100 MCG Oral Tab Take 1 tablet (10 [LATERALITY NOT STATED]   • Peripheral neuropathy    • Scalp psoriasis    • Sjogren's disease ([facility] [address] 2005   • Thyroid disease    • Trigeminal neuralgia pain      Social History    Tobacco Use      Smoking status: Never Smoker      Smokeless tobacco: Never U alert and oriented to person, place, and time. Skin: Skin is warm and dry. Psychiatric: She has a normal mood and affect. Her behavior is normal. Judgment and thought content normal.              ASSESSMENT/PLAN:   1.  Subclinical hypothyroidism  -incre

## 2020-07-20 ENCOUNTER — OFFICE VISIT (OUTPATIENT)
Dept: OTOLARYNGOLOGY | Facility: CLINIC | Age: 65
End: 2020-07-20
Payer: MEDICARE

## 2020-07-20 VITALS
HEART RATE: 73 BPM | DIASTOLIC BLOOD PRESSURE: 70 MMHG | WEIGHT: 133 LBS | SYSTOLIC BLOOD PRESSURE: 123 MMHG | HEIGHT: 64 IN | BODY MASS INDEX: 22.71 KG/M2

## 2020-07-20 DIAGNOSIS — R51.9 FACIAL PAIN: Primary | ICD-10-CM

## 2020-07-20 PROCEDURE — 99203 OFFICE O/P NEW LOW 30 MIN: CPT | Performed by: OTOLARYNGOLOGY

## 2020-07-20 PROCEDURE — G0463 HOSPITAL OUTPT CLINIC VISIT: HCPCS | Performed by: OTOLARYNGOLOGY

## 2020-07-21 NOTE — PROGRESS NOTES
Manan Ocampo is a 62year old female.  Patient presents with:  Sinus Problem: chronic congestion, frontal headache , pt prescribed medrol does pack by PA and did help vicky, Pt also c/o PND, deviated septum right   Ear Problem  Throat Problem: pt stat day as needed 1 each 0   • KETOCONAZOLE 2 % External Shampoo APPLY 10 ML EXTERNALLY 1 TIME A WEEK 534 mL 0   • folic acid 1 MG Oral Tab Take 1 mg by mouth.      • CLOBETASOL PROPIONATE 0.05 % External Foam APPLY 1 GRAM ON THE SKIN AS DIRECTED 150 g 2   • Le REVIEW OF SYSTEMS:   GENERAL HEALTH: feels well otherwise  GENERAL : denies fever, chills, sweats, weight loss, weight gain  SKIN: denies any unusual skin lesions or rashes  RESPIRATORY: denies shortness of breath with exertion  NEURO: denies headaches

## 2020-07-28 ENCOUNTER — OFFICE VISIT (OUTPATIENT)
Dept: ALLERGY | Facility: CLINIC | Age: 65
End: 2020-07-28
Payer: MEDICARE

## 2020-07-28 VITALS
DIASTOLIC BLOOD PRESSURE: 68 MMHG | SYSTOLIC BLOOD PRESSURE: 114 MMHG | RESPIRATION RATE: 20 BRPM | WEIGHT: 135 LBS | HEIGHT: 65 IN | TEMPERATURE: 98 F | BODY MASS INDEX: 22.49 KG/M2 | OXYGEN SATURATION: 98 % | HEART RATE: 90 BPM

## 2020-07-28 DIAGNOSIS — J30.2 PERENNIAL ALLERGIC RHINITIS WITH SEASONAL VARIATION: Primary | ICD-10-CM

## 2020-07-28 DIAGNOSIS — J30.89 PERENNIAL ALLERGIC RHINITIS WITH SEASONAL VARIATION: Primary | ICD-10-CM

## 2020-07-28 PROCEDURE — G0463 HOSPITAL OUTPT CLINIC VISIT: HCPCS | Performed by: ALLERGY & IMMUNOLOGY

## 2020-07-28 PROCEDURE — 99204 OFFICE O/P NEW MOD 45 MIN: CPT | Performed by: ALLERGY & IMMUNOLOGY

## 2020-07-28 RX ORDER — PREDNISONE 20 MG/1
TABLET ORAL
Qty: 10 TABLET | Refills: 0 | Status: SHIPPED | OUTPATIENT
Start: 2020-07-28 | End: 2020-10-05

## 2020-07-28 RX ORDER — MONTELUKAST SODIUM 10 MG/1
10 TABLET ORAL NIGHTLY
Qty: 30 TABLET | Refills: 0 | Status: SHIPPED | OUTPATIENT
Start: 2020-07-28 | End: 2020-08-20

## 2020-07-28 RX ORDER — MONTELUKAST SODIUM 10 MG/1
TABLET ORAL
Qty: 90 TABLET | Refills: 0 | OUTPATIENT
Start: 2020-07-28

## 2020-07-28 NOTE — PATIENT INSTRUCTIONS
Recs:   Handouts on allergic rhinitis and nonallergic rhinitis provided and reviewed  Continue with Flonase and Astelin nasal sprays  Continue with sinus rinses  Start  trial of Singulair, montelukast 10 mg once a day.   Reviewed recent FDA warning regards

## 2020-07-28 NOTE — PROGRESS NOTES
Kelly Olivier is a 62year old female. HPI:   Patient presents with: Allergies: Consult. Pt presents with concern of seasonal, environmental allergy symptoms. Pt c/o temporal headaches, sinus congestion, nasal congestion and vertigo.   Of note, pt h migraines(imitrex) , trigeminal neuralgia     No prior sinus imaging or sx     Hx of asthma, ad, or food allergy:  Denies     Hx of gerd and HHernia.      No prior ait     PT for pelvic floor and neck         HISTORY:  Past Medical History:   Diagnosis Date ESTRADIOL-NORETHINDRONE ACET 0.5-0.1 MG Oral Tab TAKE 1 TABLET BY MOUTH DAILY 84 tablet 0   • IPRATROPIUM BROMIDE 0.06 % Nasal Solution USE 2 SPRAYS IN EACH NOSTRIL TWICE DAILY 45 mL 0   • LORAZEPAM 1 MG Oral Tab TAKE 1 TABLET(1 MG) BY MOUTH THREE TIMES DA Apply Externally Cream Apply  topically 2 (two) times daily.          Allergies:    Gabapentin              OTHER (SEE COMMENTS)    Comment:Upper and lower extremity ataxia and cognitive             impairment  Marijuana (Cannabis*    OTHER (SEE COMMENTS) inspection lungs are clear to auscultation bilaterally normal respiratory effort   98% RA   Cardiovascular: regular rate and rhythm no murmurs, gallups, or rubs  Abdomen: soft non-tender non-distended  Skin/Hair: no unusual rashes present  Extremities: no including Claritin Allegra Zyrtec Xyzal Benadryl 5 days prior to her next appointment. We will attempt to skin test at next visit as well             Orders This Visit:  No orders of the defined types were placed in this encounter.       Meds This Visit:

## 2020-07-28 NOTE — TELEPHONE ENCOUNTER
Refill requested for   Montelukast Sodium (SINGULAIR) 10 MG Oral Tab 30 tablet 0 7/28/2020    Sig:   Take 1 tablet (10 mg total) by mouth nightly. Route:   Oral         ACTION: Routed to Dr. Matilde Fountain for review.  Please deny, patient seen today and issued

## 2020-07-28 NOTE — TELEPHONE ENCOUNTER
90-day supply denied.   We will try to sleep for 30 days and if improving will then switch to 90-day supply

## 2020-07-29 ENCOUNTER — TELEPHONE (OUTPATIENT)
Dept: ALLERGY | Facility: CLINIC | Age: 65
End: 2020-07-29

## 2020-07-29 NOTE — TELEPHONE ENCOUNTER
1st call - left message to patient voice mail.
Noted thank you. Upon chart review, patient does not need \"patch\" testing, but routine skin testing with a follow-up. Please call to schedule. Thank you.
Patient returned phone call and scheduled appointment for 08/20/2020.
Patient was there yesterday. The testing failed, so she was advised to schedule an appt again. Please call her back when she can come and get a patch test.    Thank you.
5

## 2020-08-04 ENCOUNTER — TELEPHONE (OUTPATIENT)
Dept: INTERNAL MEDICINE CLINIC | Facility: CLINIC | Age: 65
End: 2020-08-04

## 2020-08-04 ENCOUNTER — PATIENT MESSAGE (OUTPATIENT)
Dept: INTERNAL MEDICINE CLINIC | Facility: CLINIC | Age: 65
End: 2020-08-04

## 2020-08-04 RX ORDER — LORAZEPAM 1 MG/1
TABLET ORAL
Qty: 90 TABLET | Refills: 0 | Status: SHIPPED | OUTPATIENT
Start: 2020-08-04 | End: 2020-09-04

## 2020-08-04 NOTE — TELEPHONE ENCOUNTER
Regarding: Non-Urgent Medical Question  Contact: [phone]      ----- Message -----  From: Ramesh Latham RN  Sent: 8/4/2020  11:37 AM CDT  To: Ingris Patrick MD  Subject: Non-Urgent Medical Question                      ----- Message from [facility]

## 2020-08-04 NOTE — TELEPHONE ENCOUNTER
Dr. Michael Barber please see pt mychart message below and advise.  Thanks   Script was printed     Lynda Lee   to Guanako Pihllip MD            8/4/20 1:20 PM   Urgent RX:   Julius Chavarria for lorazepam indicates that I have to go to office to  p

## 2020-08-04 NOTE — TELEPHONE ENCOUNTER
Call reviewed and noted. Agree with triage advice provided. Agree with being off all antihistamines including H1 and H2 for least 5 days prior to any skin testing. per Dr. Chantelle Moss.       Left a message for patient regarding allergy medications and Omeprazole

## 2020-08-06 ENCOUNTER — TELEPHONE (OUTPATIENT)
Dept: ALLERGY | Facility: CLINIC | Age: 65
End: 2020-08-06

## 2020-08-06 RX ORDER — PREDNISONE 20 MG/1
TABLET ORAL
Qty: 10 TABLET | Refills: 0 | Status: SHIPPED | OUTPATIENT
Start: 2020-08-06 | End: 2020-10-05

## 2020-08-06 NOTE — TELEPHONE ENCOUNTER
Unfortunately I am not familiar with a neurologist in the area.   Probably best for her to ask her PCP for a neurology recommendation

## 2020-08-06 NOTE — TELEPHONE ENCOUNTER
Spoke with patient, reviewed Dr. Veronica Williamson message as below. She will plan to reach out to Dr. Bert Rowland for a recommendation.

## 2020-08-06 NOTE — TELEPHONE ENCOUNTER
Patient's last message was on 8/4/20 with reports of having fallen twice. Prednisone course x 5 days, started 7/31/20-8/4/20.   This seemed to have alleviated the blurred vision and dizziness as of 8/4 call.     -----------------------------------------

## 2020-08-06 NOTE — TELEPHONE ENCOUNTER
Spoke with patient, reviewed Dr. Petersen Coffee message as below. She does have an ophthalmologist, questioning any specific Neurologist recommendations.

## 2020-08-06 NOTE — TELEPHONE ENCOUNTER
Call reviewed and noted. Prednisone refilled 40 mg a day x5 days. I would not recommend to keep refilling prednisone after this if symptoms continue.   I Would recommend follow-up with a neurologist and ophthalmologist if there are concerns about continue

## 2020-08-20 ENCOUNTER — NURSE ONLY (OUTPATIENT)
Dept: ALLERGY | Facility: CLINIC | Age: 65
End: 2020-08-20
Payer: MEDICARE

## 2020-08-20 ENCOUNTER — OFFICE VISIT (OUTPATIENT)
Dept: ALLERGY | Facility: CLINIC | Age: 65
End: 2020-08-20
Payer: MEDICARE

## 2020-08-20 VITALS
HEART RATE: 88 BPM | RESPIRATION RATE: 20 BRPM | OXYGEN SATURATION: 98 % | SYSTOLIC BLOOD PRESSURE: 113 MMHG | DIASTOLIC BLOOD PRESSURE: 77 MMHG | TEMPERATURE: 98 F

## 2020-08-20 DIAGNOSIS — J32.9 CHRONIC SINUSITIS, UNSPECIFIED LOCATION: ICD-10-CM

## 2020-08-20 DIAGNOSIS — J30.89 PERENNIAL ALLERGIC RHINITIS WITH SEASONAL VARIATION: Primary | ICD-10-CM

## 2020-08-20 DIAGNOSIS — J30.2 PERENNIAL ALLERGIC RHINITIS WITH SEASONAL VARIATION: Primary | ICD-10-CM

## 2020-08-20 DIAGNOSIS — J30.89 ENVIRONMENTAL AND SEASONAL ALLERGIES: ICD-10-CM

## 2020-08-20 PROCEDURE — 95024 IQ TESTS W/ALLERGENIC XTRCS: CPT | Performed by: ALLERGY & IMMUNOLOGY

## 2020-08-20 PROCEDURE — G0463 HOSPITAL OUTPT CLINIC VISIT: HCPCS | Performed by: ALLERGY & IMMUNOLOGY

## 2020-08-20 PROCEDURE — 95004 PERQ TESTS W/ALRGNC XTRCS: CPT | Performed by: ALLERGY & IMMUNOLOGY

## 2020-08-20 PROCEDURE — 99214 OFFICE O/P EST MOD 30 MIN: CPT | Performed by: ALLERGY & IMMUNOLOGY

## 2020-08-20 RX ORDER — MONTELUKAST SODIUM 10 MG/1
10 TABLET ORAL NIGHTLY
Qty: 90 TABLET | Refills: 1 | Status: SHIPPED | OUTPATIENT
Start: 2020-08-20 | End: 2021-05-18 | Stop reason: ALTCHOICE

## 2020-08-20 RX ORDER — LEVOCETIRIZINE DIHYDROCHLORIDE 5 MG/1
5 TABLET, FILM COATED ORAL NIGHTLY
Qty: 90 TABLET | Refills: 0 | Status: SHIPPED | OUTPATIENT
Start: 2020-08-20 | End: 2020-11-16

## 2020-08-20 NOTE — PATIENT INSTRUCTIONS
Recs   handouts on allergies and avoidance measures provided and reviewed including the potential treatment option of immunotherapy  Continue with Singulair Astelin Nasonex Sudafed as needed  Trial of Xyzal, levocetirizine 5 mg once a night at bedtime in p

## 2020-08-20 NOTE — PROGRESS NOTES
Misty Ma is a 62year old female. HPI:   Patient presents with: Allergies: Pt last seen in Allergy 7/28/2020. Pt presents for skin testing to seasonal and environmental allergens.      Patient is a 80-year-old female who presents for follow-up w Procedure Laterality Date   • FOOT/TOES SURGERY PROC UNLISTED Right     Rt bunionectomy      Family History   Problem Relation Age of Onset   • Dementia Father         dementia induced by MRSA sepsis (cause of death)   • Infectious Disease Father Sodium 100 MCG Oral Tab Take 1 tablet (100 mcg total) by mouth before breakfast. 90 tablet 3   • Azelastine HCl 0.1 % Nasal Solution   0   • Fluocinonide 0.05 % External Solution   0   • famotidine (PEPCID) 40 MG Oral Tab Take 1 tablet by mouth 2 (two) jf eyes  Naproxen                OTHER (SEE COMMENTS)  Sulfa Antibiotics       HIVES  Trimethoprim            HIVES  Biaxin [Clarithromy*    ITCHING  Sulfamethoxazole W/*    ITCHING      ROS:   Allergic/Immuno:  See hpi  Cardiovascular:  Negative for irregula control    Recs   handouts on allergies and avoidance measures provided and reviewed including the potential treatment option of immunotherapy  Continue with Singulair Astelin Nasonex Sudafed as needed  Trial of Xyzal, levocetirizine 5 mg once a night at b

## 2020-08-25 ENCOUNTER — TELEPHONE (OUTPATIENT)
Dept: ALLERGY | Facility: CLINIC | Age: 65
End: 2020-08-25

## 2020-08-25 NOTE — TELEPHONE ENCOUNTER
Call reviewed and noted. As previously stated any symptoms beyond 24 hours are typically not IgE mediated and would not count is a positive allergy test.  Reactions after 24 hours are usually more T-cell mediated rather than IgE mediated.   Patient to cont

## 2020-08-25 NOTE — TELEPHONE ENCOUNTER
Dr. Adrian Norman, [facility] with patient. Verified name and . Patient states she would like to come in to see a nurse to show her her arm where testing was performed on 20 and feels she had positive test results.      Informed patient per last message

## 2020-08-25 NOTE — TELEPHONE ENCOUNTER
Patient presented to  asking that a nurse assess her intradermal injection sites. Sites assessed to right upper arm. Intradermal injection sites with some ecchymosis and healing.   Mold intradermal site with some erythema but no indurated sit

## 2020-09-03 ENCOUNTER — PATIENT MESSAGE (OUTPATIENT)
Dept: ALLERGY | Facility: CLINIC | Age: 65
End: 2020-09-03

## 2020-09-04 RX ORDER — LORAZEPAM 1 MG/1
TABLET ORAL
Qty: 90 TABLET | Refills: 0 | Status: SHIPPED | OUTPATIENT
Start: 2020-09-04 | End: 2020-10-24

## 2020-09-04 NOTE — TELEPHONE ENCOUNTER
RN called patient back and gave her a list of the molds we test for. Went over Dr. Loraine Gerber recommendations listed below and patient verbalizes understanding and has no further questions at this time.

## 2020-09-04 NOTE — TELEPHONE ENCOUNTER
Dr. Colletta Leach please see patient's MyChart message listed below:    From: Siri Madrid  To: Kamryn Rossi MD  Sent: 9/3/2020  4:31 PM CDT  Subject: Non-Urgent Medical Question    I plan to have an allergy test dose of Bactrim in your office in the next

## 2020-09-04 NOTE — TELEPHONE ENCOUNTER
Dr. Adrian Norman, please see patient's MyChart message listed below:      From: Sweta Carl  To: Brad Ching MD  Sent: 9/3/2020  4:24 PM CDT  Subject: Non-Urgent Medical Question    I am following up on the 1\" diameter delayed response( Non IgE) ( IgG,

## 2020-09-04 NOTE — TELEPHONE ENCOUNTER
RN called patient back and went over Dr. Renee Mckeon recommendations listed below. Patient verbalizes understanding.   States that she canceled the oral challenge to Bactrim yesterday as she wants to give her symptoms more time to improve prior to stopping her

## 2020-09-04 NOTE — TELEPHONE ENCOUNTER
It appears patient had a previous oral challenge to Bactrim scheduled for September 24, 2020 but it was  canceled it on September 3. Patient has no current appointments scheduled with us.   Please notify patient that she will have to reschedule her oral ch

## 2020-09-04 NOTE — TELEPHONE ENCOUNTER
Call reviewed and noted. Please provide the patient with list of molds  that we test for on skin testing. Usually indoor molds require a source of water including leaking ropes flooded basement leaky pipes.   I do not work with any specific environmental s

## 2020-09-10 ENCOUNTER — PATIENT MESSAGE (OUTPATIENT)
Dept: INTERNAL MEDICINE CLINIC | Facility: CLINIC | Age: 65
End: 2020-09-10

## 2020-09-10 NOTE — TELEPHONE ENCOUNTER
The patient calling to state we have not sent in her Lorazepam and it needs to be completed. I told her it was sent to the pharmacy.   She told me it was not received and \"to resent it\"    I called the [facility] and they did not receive

## 2020-09-12 ENCOUNTER — PATIENT MESSAGE (OUTPATIENT)
Dept: INTERNAL MEDICINE CLINIC | Facility: CLINIC | Age: 65
End: 2020-09-12

## 2020-09-12 NOTE — TELEPHONE ENCOUNTER
From: Kimberly Solis  To: Len Juárez PA-C  Sent: 9/12/2020 7:29 AM CDT  Subject: Prescription Question    I need refills of Sumatriptan 5mg nasal spray today. Medicare allows a three month supply for the same price as one month supply.   Berenice Fatima

## 2020-09-14 RX ORDER — SUMATRIPTAN 6 MG/.5ML
INJECTION, SOLUTION SUBCUTANEOUS
Qty: 0.5 ML | Refills: 0 | Status: SHIPPED | OUTPATIENT
Start: 2020-09-14 | End: 2020-10-05

## 2020-09-15 ENCOUNTER — PATIENT MESSAGE (OUTPATIENT)
Dept: OTOLARYNGOLOGY | Facility: CLINIC | Age: 65
End: 2020-09-15

## 2020-09-15 NOTE — TELEPHONE ENCOUNTER
From: Yousif Peterson  To: Rachel Valle. Ilana Nuno MD  Sent: 9/15/2020 9:31 AM CDT  Subject: Prescription Question    Hi ,   A few questions- let me know if I need to schedule an online or office appointment. 1). I am improving slowly from severe sinusitis.  The

## 2020-09-15 NOTE — TELEPHONE ENCOUNTER
From: Greg Peterson  To: Graciela Zhang. Vazquez Hu MD  Sent: 9/15/2020 9:46 AM CDT  Subject: Non-Urgent Medical Question    Hi,   The subcutaneous allergy testing performed on 8/20/20 was negative for IgE allergic response.  The \"mold\" injection had a delayed res

## 2020-09-22 RX ORDER — MOMETASONE 50 UG/1
SPRAY, METERED NASAL
Qty: 51 G | Refills: 0 | Status: SHIPPED | OUTPATIENT
Start: 2020-09-22 | End: 2021-05-18 | Stop reason: ALTCHOICE

## 2020-09-22 RX ORDER — IPRATROPIUM BROMIDE 42 UG/1
SPRAY, METERED NASAL
Qty: 45 ML | Refills: 0 | Status: SHIPPED | OUTPATIENT
Start: 2020-09-22 | End: 2021-09-09

## 2020-09-24 ENCOUNTER — NURSE TRIAGE (OUTPATIENT)
Dept: INTERNAL MEDICINE CLINIC | Facility: CLINIC | Age: 65
End: 2020-09-24

## 2020-09-24 ENCOUNTER — PATIENT MESSAGE (OUTPATIENT)
Dept: INTERNAL MEDICINE CLINIC | Facility: CLINIC | Age: 65
End: 2020-09-24

## 2020-09-24 RX ORDER — SUMATRIPTAN 5 MG/1
SPRAY NASAL
Qty: 3 EACH | Refills: 0 | Status: SHIPPED | OUTPATIENT
Start: 2020-09-24 | End: 2020-09-24

## 2020-09-24 RX ORDER — SUMATRIPTAN 5 MG/1
1 SPRAY NASAL EVERY 2 HOUR PRN
Qty: 3 EACH | Refills: 1 | Status: SHIPPED | OUTPATIENT
Start: 2020-09-24 | End: 2020-11-25

## 2020-09-24 NOTE — TELEPHONE ENCOUNTER
----- Message from Perri Peterson sent at 9/24/2020  7:21 AM CDT -----  Regarding: Non-Urgent Medical Question  Contact: [phone]  I am experiencing  a cluster of migraine. This is not uncommon with season changes.      Please refill Sumatriptan nasal

## 2020-09-24 NOTE — TELEPHONE ENCOUNTER
Refill passed per [facility] protocol.     Refill Protocol Appointment Criteria  · Appointment scheduled in the past 6 months or in the next 3 months  Recent Outpatient Visits            1 month ago Environmental and seasonal allergies    [facility]

## 2020-09-24 NOTE — TELEPHONE ENCOUNTER
Action Requested: Summary for Provider     []  Critical Lab, Recommendations Needed  [] Need Additional Advice  []   FYI    []   Need Orders  [] Need Medications Sent to Pharmacy  []  Other     SUMMARY: Patient experiencing a run of migraine headaches ic

## 2020-09-24 NOTE — TELEPHONE ENCOUNTER
From: Jorge Luis Haji  To: Lesley Jaramillo MD  Sent: 9/24/2020 7:25 AM CDT  Subject: Non-Urgent Medical Question    I need refills of Sumatriptan nasal spray. Please include refills.   Vinny Canchola

## 2020-09-24 NOTE — TELEPHONE ENCOUNTER
Refill passed per [facility] protocol.     Refill Protocol Appointment Criteria  · Appointment scheduled in the past 6 months or in the next 3 months  Recent Outpatient Visits            1 month ago Environmental and seasonal allergies    [facility]

## 2020-09-25 ENCOUNTER — PATIENT MESSAGE (OUTPATIENT)
Dept: INTERNAL MEDICINE CLINIC | Facility: CLINIC | Age: 65
End: 2020-09-25

## 2020-09-25 RX ORDER — OMEPRAZOLE 20 MG/1
20 CAPSULE, DELAYED RELEASE ORAL
Qty: 90 CAPSULE | Refills: 0 | Status: SHIPPED | OUTPATIENT
Start: 2020-09-25 | End: 2020-12-30

## 2020-09-25 NOTE — TELEPHONE ENCOUNTER
From: Dereck Syed  To: Derek Ceballos MD  Sent: 9/25/2020 9:35 AM CDT  Subject: Prescription Question    Hi,   Dr. Diann Archibald ( allergy/asthma) changed my GERD RX to omeprazole ( from famotidine). I do not need follow-up with him.  Will Dr. Reyes Farrar t

## 2020-09-26 NOTE — TELEPHONE ENCOUNTER
Patient called yesterday and discussed her headaches which are becoming more frequent. Refill of her nasal spray was sent to the pharmacy and she will be following up with neurology regarding these worsening headaches.

## 2020-10-05 ENCOUNTER — LAB ENCOUNTER (OUTPATIENT)
Dept: LAB | Age: 65
End: 2020-10-05
Attending: INTERNAL MEDICINE
Payer: MEDICARE

## 2020-10-05 ENCOUNTER — OFFICE VISIT (OUTPATIENT)
Dept: INTERNAL MEDICINE CLINIC | Facility: CLINIC | Age: 65
End: 2020-10-05
Payer: MEDICARE

## 2020-10-05 VITALS
HEART RATE: 87 BPM | RESPIRATION RATE: 16 BRPM | DIASTOLIC BLOOD PRESSURE: 80 MMHG | SYSTOLIC BLOOD PRESSURE: 131 MMHG | BODY MASS INDEX: 23.56 KG/M2 | WEIGHT: 138 LBS | HEIGHT: 64 IN

## 2020-10-05 DIAGNOSIS — T14.8XXA BRUISING: ICD-10-CM

## 2020-10-05 DIAGNOSIS — M79.671 RIGHT FOOT PAIN: Primary | ICD-10-CM

## 2020-10-05 DIAGNOSIS — Z01.818 PRE-OPERATIVE EXAMINATION: ICD-10-CM

## 2020-10-05 DIAGNOSIS — E03.8 SUBCLINICAL HYPOTHYROIDISM: ICD-10-CM

## 2020-10-05 PROCEDURE — 84439 ASSAY OF FREE THYROXINE: CPT

## 2020-10-05 PROCEDURE — 85610 PROTHROMBIN TIME: CPT

## 2020-10-05 PROCEDURE — 36415 COLL VENOUS BLD VENIPUNCTURE: CPT

## 2020-10-05 PROCEDURE — 85025 COMPLETE CBC W/AUTO DIFF WBC: CPT

## 2020-10-05 PROCEDURE — 85730 THROMBOPLASTIN TIME PARTIAL: CPT

## 2020-10-05 PROCEDURE — 80053 COMPREHEN METABOLIC PANEL: CPT

## 2020-10-05 PROCEDURE — 84443 ASSAY THYROID STIM HORMONE: CPT

## 2020-10-05 PROCEDURE — 93010 ELECTROCARDIOGRAM REPORT: CPT | Performed by: INTERNAL MEDICINE

## 2020-10-05 PROCEDURE — 93005 ELECTROCARDIOGRAM TRACING: CPT

## 2020-10-05 PROCEDURE — 99214 OFFICE O/P EST MOD 30 MIN: CPT | Performed by: INTERNAL MEDICINE

## 2020-10-05 PROCEDURE — G0463 HOSPITAL OUTPT CLINIC VISIT: HCPCS | Performed by: INTERNAL MEDICINE

## 2020-10-05 RX ORDER — SUMATRIPTAN 6 MG/.5ML
INJECTION, SOLUTION SUBCUTANEOUS
Qty: 0.5 ML | Refills: 0 | Status: SHIPPED | OUTPATIENT
Start: 2020-10-05 | End: 2020-11-25

## 2020-10-05 NOTE — PROGRESS NOTES
Rosaura Cabot is a 62year old female who presents for a pre-operative physical exam. Patient is to have right foot surgery, to be done by Dr. Jhonny Marquez at [facility] on !0/28/2020. HPI:   Pt complains of Right foot pain.     Current Outpatient Medications   Med Take 2 tablets daily     • Azelastine HCl 0.1 % Nasal Solution   0   • Fluocinonide 0.05 % External Solution   0   • famotidine (PEPCID) 40 MG Oral Tab Take 1 tablet by mouth 2 (two) times daily as needed for Heartburn.  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 Pt has no significant history of cardiac or pulmonary conditions. Pt may be  a good surgical candidate.  This consult was sent back the referring physician, Dr. Mireya Betts

## 2020-10-11 ENCOUNTER — PATIENT MESSAGE (OUTPATIENT)
Dept: INTERNAL MEDICINE CLINIC | Facility: CLINIC | Age: 65
End: 2020-10-11

## 2020-10-11 RX ORDER — LEVOTHYROXINE SODIUM 0.03 MG/1
TABLET ORAL
Qty: 90 TABLET | Refills: 0 | Status: SHIPPED | OUTPATIENT
Start: 2020-10-11 | End: 2021-03-23

## 2020-10-11 RX ORDER — ESTRADIOL AND NORETHINDRONE ACETATE .5; .1 MG/1; MG/1
1 TABLET ORAL DAILY
Qty: 90 TABLET | Refills: 0 | Status: SHIPPED | OUTPATIENT
Start: 2020-10-11 | End: 2021-01-06

## 2020-10-11 NOTE — TELEPHONE ENCOUNTER
From: Kimberly Solis  To: Blanca Frost MD  Sent: 10/11/2020 7:43 AM CDT  Subject: Non-Urgent Medical Question    REFILL request dated 9/25 for: Estra 0.5mg/noreth. still not responded to by office. [facility] confirms multiple requests.   I have f

## 2020-10-11 NOTE — TELEPHONE ENCOUNTER
ESTRADIOL-NORETHINDRONE ACET 0.5-0.1 MG Oral Tab . Medication is inactive. Please advise on pended medication. Thanks.     Recent Outpatient Visits            6 days ago Right foot pain    Iwona Valero MD

## 2020-10-23 ENCOUNTER — PATIENT MESSAGE (OUTPATIENT)
Dept: INTERNAL MEDICINE CLINIC | Facility: CLINIC | Age: 65
End: 2020-10-23

## 2020-10-24 RX ORDER — LORAZEPAM 1 MG/1
1 TABLET ORAL 3 TIMES DAILY PRN
Qty: 90 TABLET | Refills: 0 | Status: SHIPPED | OUTPATIENT
Start: 2020-10-24 | End: 2020-11-23

## 2020-10-24 NOTE — TELEPHONE ENCOUNTER
Routed to Dr David Irby for advise, thanks. Requested Prescriptions     Pending Prescriptions Disp Refills   • LORazepam 1 MG Oral Tab 90 tablet 0     Sig: Take 1 tablet (1 mg total) by mouth 3 (three) times daily as needed.        Last Office Visit wit

## 2020-10-26 RX ORDER — LORAZEPAM 1 MG/1
TABLET ORAL
Qty: 90 TABLET | Refills: 0 | OUTPATIENT
Start: 2020-10-26

## 2020-10-27 ENCOUNTER — TELEPHONE (OUTPATIENT)
Dept: INTERNAL MEDICINE CLINIC | Facility: CLINIC | Age: 65
End: 2020-10-27

## 2020-10-27 NOTE — TELEPHONE ENCOUNTER
Message left to return call If patient returns call please let her know that her pre op clearance was faxed

## 2020-10-27 NOTE — TELEPHONE ENCOUNTER
Patient sent message through [employer]. Dr. Rakan Larkin,      I have surgery tomorrow. [place] states they have not received any of the pre-op: medical exam or labs. Urgent. FAX :   [phone]  DR. Jabari Winter,    Attn.   Dago Giles

## 2020-10-27 NOTE — TELEPHONE ENCOUNTER
Fax failed, contacted Dr Zhang Mouse office, they suggested trying [phone], attn pre op for Dr Anatoliy Gupta

## 2020-10-27 NOTE — TELEPHONE ENCOUNTER
Dr Marge Ordonez office calling for preop clearance for patient's surgery tomorrow, they need, H&P, EKG, Labs fax to [phone] attn Dr Kaci Pelletier, EKG and labs sent to right fax

## 2020-10-27 NOTE — TELEPHONE ENCOUNTER
Dr Ivory Negron, patient is having foot surgery tomorrow with Dr Rodrigo Agarwal, they request letter clearing her for surgery fax to [phone]  EKG, progress notes, labs have already been sent

## 2020-11-16 RX ORDER — LEVOCETIRIZINE DIHYDROCHLORIDE 5 MG/1
5 TABLET, FILM COATED ORAL NIGHTLY
Qty: 90 TABLET | Refills: 1 | Status: SHIPPED | OUTPATIENT
Start: 2020-11-16 | End: 2021-03-23

## 2020-11-16 NOTE — TELEPHONE ENCOUNTER
Patient last seen in Allergy 8/20/2020 for . . . Perennial allergic rhinitis with seasonal variation  (primary encounter diagnosis)  Chronic sinusitis, unspecified location    Refill request received for .  . .    Levocetirizine Dihydrochloride (XYZAL) 5

## 2020-11-23 RX ORDER — LORAZEPAM 1 MG/1
TABLET ORAL
Qty: 90 TABLET | Refills: 0 | Status: SHIPPED | OUTPATIENT
Start: 2020-11-23 | End: 2021-01-04

## 2020-11-24 ENCOUNTER — TELEPHONE (OUTPATIENT)
Dept: INTERNAL MEDICINE CLINIC | Facility: CLINIC | Age: 65
End: 2020-11-24

## 2020-11-24 ENCOUNTER — NURSE TRIAGE (OUTPATIENT)
Dept: INTERNAL MEDICINE CLINIC | Facility: CLINIC | Age: 65
End: 2020-11-24

## 2020-11-24 ENCOUNTER — TELEMEDICINE (OUTPATIENT)
Dept: INTERNAL MEDICINE CLINIC | Facility: CLINIC | Age: 65
End: 2020-11-24
Payer: MEDICARE

## 2020-11-24 DIAGNOSIS — R05.9 COUGH: Primary | ICD-10-CM

## 2020-11-24 PROCEDURE — 99213 OFFICE O/P EST LOW 20 MIN: CPT | Performed by: PHYSICIAN ASSISTANT

## 2020-11-24 RX ORDER — FAMOTIDINE 40 MG/1
40 TABLET, FILM COATED ORAL DAILY
Qty: 30 TABLET | Refills: 0 | Status: SHIPPED | OUTPATIENT
Start: 2020-11-24 | End: 2020-11-28

## 2020-11-24 NOTE — TELEPHONE ENCOUNTER
Gwen, clarification is needed on directions for medication. Please advise.      Famotidine 40 mg should it be taken once a day or twice a day

## 2020-11-24 NOTE — PROGRESS NOTES
This is a telemedicine visit with live, interactive video and audio. Patient understands and accepts financial responsibility for any deductible, co-insurance and/or co-pays associated with this service.     SUBJECTIVE  Patient is experiencing symptoms OTHER (SEE COMMENTS)    Comment:Upper and lower extremity ataxia and cognitive             impairment  Marijuana (Cannabis*    OTHER (SEE COMMENTS)    Comment:Agitation, disturbed thought, sleepiness  Ropinirole              OTHER (SEE COMMENTS)    Comment Ketoconazole 2 % External Shampoo APPLY 10 ML EXTERNALLY 1 TIME A WEEK 720 mL 1   • Montelukast Sodium (SINGULAIR) 10 MG Oral Tab Take 1 tablet (10 mg total) by mouth nightly. 90 tablet 1   • Pseudoephedrine HCl (SUDAFED OR) Take by mouth.      • methylPRED

## 2020-11-24 NOTE — TELEPHONE ENCOUNTER
RN pls call pt and triage, thanks.        .From: Sweta Carl  To: Shaun Bruce PA-C  Sent: 11/24/2020  8:31 AM CST  Subject: Non-Urgent Medical Question    Hi,    I am experiencing symptoms similar to what I experienced in early summer:   post-nasal dri

## 2020-11-24 NOTE — TELEPHONE ENCOUNTER
Action Requested: Summary for Provider     []  Critical Lab, Recommendations Needed  [] Need Additional Advice  []   FYI    []   Need Orders  [] Need Medications Sent to Pharmacy  []  Other     SUMMARY:tele visit- s/s persistent cough- see mychart message

## 2020-11-25 ENCOUNTER — NURSE TRIAGE (OUTPATIENT)
Dept: INTERNAL MEDICINE CLINIC | Facility: CLINIC | Age: 65
End: 2020-11-25

## 2020-11-25 RX ORDER — SUMATRIPTAN 6 MG/.5ML
INJECTION, SOLUTION SUBCUTANEOUS
Qty: 3 VIAL | Refills: 1 | Status: CANCELLED | OUTPATIENT
Start: 2020-11-25

## 2020-11-25 RX ORDER — SUMATRIPTAN 5 MG/1
2 SPRAY NASAL EVERY 2 HOUR PRN
Qty: 3 EACH | Refills: 1 | Status: SHIPPED
Start: 2020-11-25 | End: 2021-03-23

## 2020-11-25 NOTE — TELEPHONE ENCOUNTER
Action Requested: Summary for Provider     []  Critical Lab, Recommendations Needed  [] Need Additional Advice  []   FYI    []   Need Orders  [] Need Medications Sent to Pharmacy  []  Other     SUMMARY:Pt had tele visit yesterday, also had h/a but thought

## 2020-11-25 NOTE — TELEPHONE ENCOUNTER
Sumatriptan needs to be refilled and dose needs to be changed by neuro.  Also I did sent famotidine 40mg bid for 15 days to pharm on file yesterday

## 2020-11-25 NOTE — TELEPHONE ENCOUNTER
Patient's states last prescription of Sumatriptan nasal spray 5mg was prescribed by . Patient is asking if dose can be changed to 2 sprays every 2 hours as needed for migraine and if 3 boxes can be ordered. Please advise.

## 2020-11-28 RX ORDER — FAMOTIDINE 40 MG/1
TABLET, FILM COATED ORAL
Qty: 90 TABLET | Refills: 0 | Status: SHIPPED | OUTPATIENT
Start: 2020-11-28 | End: 2021-07-07 | Stop reason: ALTCHOICE

## 2020-12-01 ENCOUNTER — MED REC SCAN ONLY (OUTPATIENT)
Dept: INTERNAL MEDICINE CLINIC | Facility: CLINIC | Age: 65
End: 2020-12-01

## 2020-12-03 RX ORDER — CLOBETASOL PROPIONATE 0.5 MG/G
AEROSOL, FOAM TOPICAL
Qty: 150 G | Refills: 2 | Status: SHIPPED | OUTPATIENT
Start: 2020-12-03 | End: 2021-02-01

## 2020-12-30 RX ORDER — OMEPRAZOLE 20 MG/1
CAPSULE, DELAYED RELEASE ORAL
Qty: 90 CAPSULE | Refills: 0 | Status: SHIPPED | OUTPATIENT
Start: 2020-12-30 | End: 2021-04-06

## 2021-01-04 RX ORDER — LORAZEPAM 1 MG/1
TABLET ORAL
Qty: 90 TABLET | Refills: 0 | Status: SHIPPED | OUTPATIENT
Start: 2021-01-04 | End: 2021-02-09

## 2021-01-06 RX ORDER — ESTRADIOL AND NORETHINDRONE ACETATE .5; .1 MG/1; MG/1
1 TABLET ORAL DAILY
Qty: 84 TABLET | Refills: 0 | Status: SHIPPED | OUTPATIENT
Start: 2021-01-06 | End: 2021-03-30

## 2021-01-19 ENCOUNTER — MED REC SCAN ONLY (OUTPATIENT)
Dept: INTERNAL MEDICINE CLINIC | Facility: CLINIC | Age: 66
End: 2021-01-19

## 2021-02-01 RX ORDER — CLOBETASOL PROPIONATE 0.5 MG/G
AEROSOL, FOAM TOPICAL
Qty: 50 G | Refills: 0 | Status: SHIPPED | OUTPATIENT
Start: 2021-02-01 | End: 2021-03-01

## 2021-02-09 RX ORDER — LORAZEPAM 1 MG/1
TABLET ORAL
Qty: 90 TABLET | Refills: 0 | Status: SHIPPED | OUTPATIENT
Start: 2021-02-09 | End: 2021-03-18

## 2021-02-11 NOTE — TELEPHONE ENCOUNTER
From: Kelly Olivier  To: Sarah Negron MD  Sent: 9/10/2020 7:40 AM CDT  Subject: Prescription Question    Last Friday 9/4/20 Mariana requested a refill of Lorazepam on my behalf. I am now out of medication and urgently need a refill.   Last month
See 9/4/20 refill encounter
n/a

## 2021-02-26 ENCOUNTER — MED REC SCAN ONLY (OUTPATIENT)
Dept: INTERNAL MEDICINE CLINIC | Facility: CLINIC | Age: 66
End: 2021-02-26

## 2021-03-01 RX ORDER — CLOBETASOL PROPIONATE 0.5 MG/G
AEROSOL, FOAM TOPICAL
Qty: 50 G | Refills: 0 | Status: SHIPPED | OUTPATIENT
Start: 2021-03-01 | End: 2021-04-19

## 2021-03-01 RX ORDER — CLOBETASOL PROPIONATE 0.5 MG/G
1 AEROSOL, FOAM TOPICAL AS DIRECTED
Qty: 50 G | Refills: 0 | Status: SHIPPED | OUTPATIENT
Start: 2021-03-01 | End: 2021-03-23

## 2021-03-05 ENCOUNTER — PATIENT MESSAGE (OUTPATIENT)
Dept: RHEUMATOLOGY | Facility: CLINIC | Age: 66
End: 2021-03-05

## 2021-03-05 NOTE — TELEPHONE ENCOUNTER
From: Luis Peoples  To: Roma Chaudhry MD  Sent: 3/5/2021 9:50 AM CST  Subject: Non-Urgent Medical Question    Hello,   I am concerned that I could have myesthenia gravis. I cannot keep my eyelids open. I feel exhausted 3 hrs after waking.  I have been cho

## 2021-03-18 RX ORDER — LORAZEPAM 1 MG/1
TABLET ORAL
Qty: 90 TABLET | Refills: 0 | Status: SHIPPED | OUTPATIENT
Start: 2021-03-18 | End: 2021-04-19

## 2021-03-21 ENCOUNTER — TELEPHONE (OUTPATIENT)
Dept: INTERNAL MEDICINE CLINIC | Facility: CLINIC | Age: 66
End: 2021-03-21

## 2021-03-21 ENCOUNTER — PATIENT MESSAGE (OUTPATIENT)
Dept: INTERNAL MEDICINE CLINIC | Facility: CLINIC | Age: 66
End: 2021-03-21

## 2021-03-22 ENCOUNTER — NURSE TRIAGE (OUTPATIENT)
Dept: INTERNAL MEDICINE CLINIC | Facility: CLINIC | Age: 66
End: 2021-03-22

## 2021-03-22 NOTE — TELEPHONE ENCOUNTER
Action Requested: Summary for Provider     []  Critical Lab, Recommendations Needed  [] Need Additional Advice  []   FYI    []   Need Orders  [] Need Medications Sent to Pharmacy  []  Other     SUMMARY: Pt c/o general fatigue and bilateral eyes drooping x

## 2021-03-22 NOTE — TELEPHONE ENCOUNTER
From: Francis Holm  To: Rosa Haque MD  Sent: 3/21/2021 12:19 PM CDT  Subject: Non-Urgent Medical Question    I am experiencing some symptoms that could be consistent with post Covid19 myasthenia gravis. I would like to schedule an appt.  in per

## 2021-03-22 NOTE — TELEPHONE ENCOUNTER
Israel Rossi MD 7 hours ago (12:19 PM)     I am experiencing some symptoms that could be consistent with post Covid19 myasthenia gravis. I would like to schedule an appt. in person or online to discuss with you.   Thank you,   Jul

## 2021-03-23 ENCOUNTER — LAB ENCOUNTER (OUTPATIENT)
Dept: LAB | Age: 66
End: 2021-03-23
Attending: INTERNAL MEDICINE
Payer: MEDICARE

## 2021-03-23 ENCOUNTER — OFFICE VISIT (OUTPATIENT)
Dept: INTERNAL MEDICINE CLINIC | Facility: CLINIC | Age: 66
End: 2021-03-23
Payer: MEDICARE

## 2021-03-23 VITALS
RESPIRATION RATE: 16 BRPM | BODY MASS INDEX: 23.22 KG/M2 | HEART RATE: 72 BPM | DIASTOLIC BLOOD PRESSURE: 77 MMHG | WEIGHT: 136 LBS | HEIGHT: 64 IN | SYSTOLIC BLOOD PRESSURE: 112 MMHG

## 2021-03-23 DIAGNOSIS — E03.9 ACQUIRED HYPOTHYROIDISM: ICD-10-CM

## 2021-03-23 DIAGNOSIS — G50.0 TRIGEMINAL NEURALGIA: ICD-10-CM

## 2021-03-23 DIAGNOSIS — R53.1 WEAKNESS: ICD-10-CM

## 2021-03-23 DIAGNOSIS — E55.9 VITAMIN D DEFICIENCY: ICD-10-CM

## 2021-03-23 DIAGNOSIS — Z98.890 S/P FOOT SURGERY, RIGHT: Primary | ICD-10-CM

## 2021-03-23 DIAGNOSIS — E03.8 SUBCLINICAL HYPOTHYROIDISM: ICD-10-CM

## 2021-03-23 LAB — TSI SER-ACNC: 0.83 MIU/ML (ref 0.36–3.74)

## 2021-03-23 PROCEDURE — 83516 IMMUNOASSAY NONANTIBODY: CPT

## 2021-03-23 PROCEDURE — 84238 ASSAY NONENDOCRINE RECEPTOR: CPT

## 2021-03-23 PROCEDURE — 82306 VITAMIN D 25 HYDROXY: CPT

## 2021-03-23 PROCEDURE — 83519 RIA NONANTIBODY: CPT

## 2021-03-23 PROCEDURE — 36415 COLL VENOUS BLD VENIPUNCTURE: CPT

## 2021-03-23 PROCEDURE — 86255 FLUORESCENT ANTIBODY SCREEN: CPT

## 2021-03-23 PROCEDURE — 99214 OFFICE O/P EST MOD 30 MIN: CPT | Performed by: INTERNAL MEDICINE

## 2021-03-23 PROCEDURE — 84443 ASSAY THYROID STIM HORMONE: CPT

## 2021-03-23 RX ORDER — ALBUTEROL SULFATE 90 UG/1
2 AEROSOL, METERED RESPIRATORY (INHALATION) EVERY 4 HOURS PRN
Qty: 1 INHALER | Refills: 3 | COMMUNITY
Start: 2021-03-23 | End: 2021-04-06

## 2021-03-23 RX ORDER — SUMATRIPTAN 5 MG/1
2 SPRAY NASAL EVERY 2 HOUR PRN
Qty: 3 EACH | Refills: 1 | COMMUNITY
Start: 2021-03-23 | End: 2021-08-11

## 2021-03-23 NOTE — PROGRESS NOTES
Sylwia Baldwin is a 60year old female. HPI:     1. S/P Right foot surgery    The patient had a fall last year and suffered an injury to her right foot. She subsequently had surgery done on this at [facility] and is currently healing from that process.     2. T Triamcinolone Acetonide 0.05 % External Ointment 0.05% ointment apply to rash 1-2 times a day as needed 1 each 0   • folic acid 1 MG Oral Tab Take 1 mg by mouth. • Vitamin D3 2000 units Oral Cap Take 2 Units by mouth daily.  Take 2 tablets daily     • A • methylPREDNISolone (MEDROL) 4 MG Oral Tablet Therapy Pack As directed.  (Patient not taking: Reported on 3/23/2021 ) 1 kit 0   • Vitamin E 100 UNIT/GM External Cream vitamin E (Patient not taking: Reported on 3/23/2021)     • OxyCODONE HCl (Barbara Maddy) edema    ASSESSMENT AND PLAN:   1. S/P foot surgery, right    The patient had a fall last year and suffered an injury to her right foot. She subsequently had surgery done on this at [facility] and is currently healing from that process.     2. Trigeminal neuralgi

## 2021-03-24 LAB — 25(OH)D3 SERPL-MCNC: 52.5 NG/ML (ref 30–100)

## 2021-03-30 ENCOUNTER — PATIENT MESSAGE (OUTPATIENT)
Dept: INTERNAL MEDICINE CLINIC | Facility: CLINIC | Age: 66
End: 2021-03-30

## 2021-03-30 LAB
ACETYLCHOLINE BINDING AB: 0 NMOL/L
ACETYLCHOLINE BLOCKING AB: 6 %
TITIN ANTIBODY: <0.09 IV

## 2021-03-30 RX ORDER — ESTRADIOL AND NORETHINDRONE ACETATE .5; .1 MG/1; MG/1
1 TABLET ORAL DAILY
Qty: 84 TABLET | Refills: 0 | Status: SHIPPED | OUTPATIENT
Start: 2021-03-30 | End: 2021-06-21

## 2021-03-30 RX ORDER — LEVOTHYROXINE SODIUM 0.12 MG/1
125 TABLET ORAL
Qty: 90 TABLET | Refills: 1 | Status: SHIPPED | OUTPATIENT
Start: 2021-03-30 | End: 2021-09-24

## 2021-03-30 RX ORDER — FLUOCINONIDE 0.5 MG/ML
1 SOLUTION TOPICAL DAILY
Qty: 60 ML | Refills: 0 | Status: SHIPPED | OUTPATIENT
Start: 2021-03-30 | End: 2021-07-29

## 2021-03-30 NOTE — TELEPHONE ENCOUNTER
From: Richard Mosley  To: Alex Cardenas MD  Sent: 3/30/2021 11:07 AM CDT  Subject: Prescription Question    Hi-  REFILLS. 1) Levothyroxine 0.125 mg. 3 mo supply. ( SEE recent labs results for TSH)   2). Fluocinonide topical solution 0.05%. 60 ml.  3

## 2021-04-05 DIAGNOSIS — R53.1 WEAKNESS: Primary | ICD-10-CM

## 2021-04-06 ENCOUNTER — TELEMEDICINE (OUTPATIENT)
Dept: INTERNAL MEDICINE CLINIC | Facility: CLINIC | Age: 66
End: 2021-04-06
Payer: MEDICARE

## 2021-04-06 ENCOUNTER — NURSE TRIAGE (OUTPATIENT)
Dept: INTERNAL MEDICINE CLINIC | Facility: CLINIC | Age: 66
End: 2021-04-06

## 2021-04-06 DIAGNOSIS — R06.02 SOB (SHORTNESS OF BREATH): Primary | ICD-10-CM

## 2021-04-06 PROCEDURE — 99214 OFFICE O/P EST MOD 30 MIN: CPT | Performed by: NURSE PRACTITIONER

## 2021-04-06 RX ORDER — OMEPRAZOLE 20 MG/1
CAPSULE, DELAYED RELEASE ORAL
Qty: 90 CAPSULE | Refills: 0 | Status: SHIPPED | OUTPATIENT
Start: 2021-04-06 | End: 2021-05-19

## 2021-04-06 RX ORDER — ALBUTEROL SULFATE 90 UG/1
2 AEROSOL, METERED RESPIRATORY (INHALATION) EVERY 4 HOURS PRN
Qty: 1 INHALER | Refills: 3 | Status: SHIPPED | OUTPATIENT
Start: 2021-04-06 | End: 2021-08-09

## 2021-04-06 NOTE — ASSESSMENT & PLAN NOTE
A/P 81-year-old female who had Covid in November after having foot surgery at [facility] in May. She was very fatigued and short of breath but was never hospitalized.   She recently weaned off her inhalers but now after she received the Moderm[employer] vaccine Joaquín Mcdowell

## 2021-04-06 NOTE — PROGRESS NOTES
HPI:    Patient ID: Francis Holm is a 60year old female. Please note that the following visit was completed using two-way, real-time interactive audio and video communication.   This has been done in good ludmila to provide continuity of care in the best Further conversation regarding her chest pain when she takes a deep breath. Patient will be sent to the ED. Patient reluctant but will go.       Past Medical History:   Diagnosis Date   • Arthritis    • Autoimmune disorder ([facility] [address] 2003   • Bunion of right fo Organizations:       Attends Club or Organization Meetings:       Marital Status:   Intimate Partner Violence:       Fear of Current or Ex-Partner:       Emotionally Abused:       Physically Abused:       Sexually Abused:        Review of Systems   Constit needed for Migraine.  3 each 1   • LORAZEPAM 1 MG Oral Tab TAKE 1 TABLET(1 MG) BY MOUTH THREE TIMES DAILY AS NEEDED 90 tablet 0   • CLOBETASOL PROPIONATE 0.05 % External Foam APPLY 1 GRAM ON THE SKIN AS DIRECTED 50 g 0   • FAMOTIDINE 40 MG Oral Tab TAKE 1 T OTHER (SEE COMMENTS)    Comment:Vertigo  Thimerosal              SWELLING    Comment:When given in eyes  Naproxen                OTHER (SEE COMMENTS)  Sulfa Antibiotics       HIVES  Trimethoprim            HIVES  Biaxin [Clarithromy*    ITCHING  Sulfametho takes a deep breath. Patient will be sent to the ED. Patient reluctant but will go. Relevant Orders    XR CHEST PA + LAT CHEST (CPT=71046)             No follow-ups on file. No orders of the defined types were placed in this encounter.       Med

## 2021-04-06 NOTE — TELEPHONE ENCOUNTER
Action Requested: Summary for Provider     []  Critical Lab, Recommendations Needed  [] Need Additional Advice  []   FYI    []   Need Orders  [] Need Medications Sent to Pharmacy  []  Other     SUMMARY: formerly Covid positive patient 11/2020, in recent da

## 2021-04-07 ENCOUNTER — TELEPHONE (OUTPATIENT)
Dept: INTERNAL MEDICINE CLINIC | Facility: CLINIC | Age: 66
End: 2021-04-07

## 2021-04-07 NOTE — TELEPHONE ENCOUNTER
Follow up phone call. Patient took the albuterol and feels better. Her breathing is better. She does not have a pulse oximeter at home. If she has any SOB or chest pain she will go to the ED.     Winifred Evans, ANP

## 2021-04-12 NOTE — TELEPHONE ENCOUNTER
Spoke to pt who states she already discussed not going to the ER with Radha Schulte on Saturday. Pt also states she dropped her inhaler and now it isn't functioning.     Spoke with pharmacist who states [facility] does not provide replacement chambers and

## 2021-04-19 RX ORDER — CLOBETASOL PROPIONATE 0.5 MG/G
1 AEROSOL, FOAM TOPICAL AS DIRECTED
Qty: 50 G | Refills: 0 | Status: SHIPPED | OUTPATIENT
Start: 2021-04-19 | End: 2021-06-24

## 2021-04-19 RX ORDER — LORAZEPAM 1 MG/1
1 TABLET ORAL 3 TIMES DAILY
Qty: 90 TABLET | Refills: 0 | Status: SHIPPED | OUTPATIENT
Start: 2021-04-19 | End: 2021-06-01

## 2021-05-12 ENCOUNTER — PATIENT MESSAGE (OUTPATIENT)
Dept: INTERNAL MEDICINE CLINIC | Facility: CLINIC | Age: 66
End: 2021-05-12

## 2021-05-12 DIAGNOSIS — G57.00 PIRIFORMIS SYNDROME, UNSPECIFIED LATERALITY: ICD-10-CM

## 2021-05-12 DIAGNOSIS — M47.818 ARTHRITIS OF SACROILIAC JOINT: Primary | ICD-10-CM

## 2021-05-12 NOTE — TELEPHONE ENCOUNTER
Routed to Dr Sloane Mix for advise, thanks. Last Telemed with A [facility] APRN on 4-6-21  Referral pended. No future appointments.

## 2021-05-12 NOTE — TELEPHONE ENCOUNTER
From: [facility]  To: Crystal Benitez MD  Sent: 5/12/2021 7:39 AM CDT  Subject: Referral Request    Hi-    I am struggling with recurrent pain in R buttock, groin, pelvis and R leg spasticity from Piriformis Syndrome.   I received a referral from

## 2021-05-18 ENCOUNTER — OFFICE VISIT (OUTPATIENT)
Dept: OTOLARYNGOLOGY | Facility: CLINIC | Age: 66
End: 2021-05-18
Payer: MEDICARE

## 2021-05-18 VITALS
DIASTOLIC BLOOD PRESSURE: 68 MMHG | WEIGHT: 136 LBS | BODY MASS INDEX: 23.22 KG/M2 | SYSTOLIC BLOOD PRESSURE: 100 MMHG | HEIGHT: 64 IN

## 2021-05-18 DIAGNOSIS — R05.9 COUGH: Primary | ICD-10-CM

## 2021-05-18 PROCEDURE — 99213 OFFICE O/P EST LOW 20 MIN: CPT | Performed by: OTOLARYNGOLOGY

## 2021-05-18 PROCEDURE — 31575 DIAGNOSTIC LARYNGOSCOPY: CPT | Performed by: OTOLARYNGOLOGY

## 2021-05-19 ENCOUNTER — LAB ENCOUNTER (OUTPATIENT)
Dept: LAB | Age: 66
End: 2021-05-19
Attending: INTERNAL MEDICINE
Payer: MEDICARE

## 2021-05-19 ENCOUNTER — OFFICE VISIT (OUTPATIENT)
Dept: INTERNAL MEDICINE CLINIC | Facility: CLINIC | Age: 66
End: 2021-05-19
Payer: MEDICARE

## 2021-05-19 VITALS
HEART RATE: 91 BPM | WEIGHT: 134 LBS | BODY MASS INDEX: 22.88 KG/M2 | RESPIRATION RATE: 16 BRPM | HEIGHT: 64 IN | DIASTOLIC BLOOD PRESSURE: 75 MMHG | SYSTOLIC BLOOD PRESSURE: 113 MMHG

## 2021-05-19 DIAGNOSIS — E03.9 ACQUIRED HYPOTHYROIDISM: ICD-10-CM

## 2021-05-19 DIAGNOSIS — R53.1 WEAKNESS: ICD-10-CM

## 2021-05-19 DIAGNOSIS — K21.00 GASTROESOPHAGEAL REFLUX DISEASE WITH ESOPHAGITIS WITHOUT HEMORRHAGE: Primary | ICD-10-CM

## 2021-05-19 DIAGNOSIS — G50.0 TRIGEMINAL NEURALGIA: ICD-10-CM

## 2021-05-19 PROCEDURE — 83519 RIA NONANTIBODY: CPT

## 2021-05-19 PROCEDURE — 36415 COLL VENOUS BLD VENIPUNCTURE: CPT

## 2021-05-19 PROCEDURE — 99214 OFFICE O/P EST MOD 30 MIN: CPT | Performed by: INTERNAL MEDICINE

## 2021-05-19 RX ORDER — OMEPRAZOLE 40 MG/1
40 CAPSULE, DELAYED RELEASE ORAL DAILY
Qty: 30 CAPSULE | Refills: 2 | Status: SHIPPED | OUTPATIENT
Start: 2021-05-19 | End: 2021-07-07

## 2021-05-19 NOTE — PROGRESS NOTES
Luis Peoples is a 60year old female. Patient presents with:   Follow - Up: facial pain seems to have resolved  ,sinus has burning pain ,post nasal drip ,and a dry cough ,medrol dose pk only gave slight relief ,patient had Covid in Nov. 2020     HPI:   S each 0   • Vitamin D3 2000 units Oral Cap Take 2 Units by mouth daily. Take 2 tablets daily     • ondansetron (ZOFRAN-ODT) 4 MG Oral Tablet Dispersible Take 4 mg by mouth every 8 (eight) hours as needed for Nausea.      • metRONIDAZOLE (METROCREAM) 0.75 % A - Cranial nerves II through XII grossly intact. Neck Exam Normal Inspection - Normal. Palpation - Normal. Parotid gland - Normal. Thyroid gland - Normal.   Psychiatric Normal Orientation - Oriented to time, place, person & situation.  Appropriate mood and soon.      The patient indicates understanding of these issues and agrees to the plan. Jax Robles MD  5/18/2021  7:12 PM

## 2021-05-19 NOTE — PROGRESS NOTES
Jessica Morris is a 60year old female. HPI:     1. GERD    Has GERD symptoms with reflux. Has taken PPIs for this with some improvement. Symptoms still persist somewhat      2. Trigeminal Neuralgia. Has been treated for this for year.  Has Had High Finacea 15 % topical foam     • Triamcinolone Acetonide 0.05 % External Ointment 0.05% ointment apply to rash 1-2 times a day as needed 1 each 0   • Vitamin D3 2000 units Oral Cap Take 2 Units by mouth daily.  Take 2 tablets daily        Past Medical Histor clothes  Work on losing weight  Avoid eating late at night and lying down after eating a meal  Avoid NSAIDs alike advil, motrin, aleve and naprosyn  Avoid caffeine, alcohol and tobacco use OTC H2 blocjkers( like pepcid/famotidine, zantac/ranidine) and /or

## 2021-05-29 ENCOUNTER — APPOINTMENT (OUTPATIENT)
Dept: GENERAL RADIOLOGY | Age: 66
End: 2021-05-29
Attending: NURSE PRACTITIONER
Payer: COMMERCIAL

## 2021-05-29 ENCOUNTER — HOSPITAL ENCOUNTER (OUTPATIENT)
Age: 66
Discharge: HOME | End: 2021-05-29
Payer: COMMERCIAL

## 2021-05-29 VITALS
HEART RATE: 91 BPM | SYSTOLIC BLOOD PRESSURE: 127 MMHG | OXYGEN SATURATION: 100 % | DIASTOLIC BLOOD PRESSURE: 69 MMHG | TEMPERATURE: 97 F | RESPIRATION RATE: 17 BRPM

## 2021-05-29 DIAGNOSIS — S16.1XXA STRAIN OF NECK MUSCLE, INITIAL ENCOUNTER: ICD-10-CM
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
## 2021-05-29 PROCEDURE — 72040 X-RAY EXAM NECK SPINE 2-3 VW: CPT | Performed by: NURSE PRACTITIONER

## 2021-05-29 PROCEDURE — 73010 X-RAY EXAM OF SHOULDER BLADE: CPT | Performed by: NURSE PRACTITIONER

## 2021-05-29 PROCEDURE — 99213 OFFICE O/P EST LOW 20 MIN: CPT | Performed by: NURSE PRACTITIONER

## 2021-05-29 RX ORDER — CYCLOBENZAPRINE HCL 10 MG
10 TABLET ORAL 3 TIMES DAILY PRN
Qty: 20 TABLET | Refills: 0 | Status: SHIPPED | OUTPATIENT
Start: 2021-05-29 | End: 2021-06-05
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
## 2021-06-01 RX ORDER — LORAZEPAM 1 MG/1
TABLET ORAL
Qty: 90 TABLET | Refills: 0 | Status: SHIPPED | OUTPATIENT
Start: 2021-06-01 | End: 2021-07-08

## 2021-06-01 RX ORDER — LORAZEPAM 1 MG/1
1 TABLET ORAL 3 TIMES DAILY
Qty: 90 TABLET | Refills: 0 | Status: SHIPPED | OUTPATIENT
Start: 2021-06-01 | End: 2021-08-09

## 2021-06-07 ENCOUNTER — TELEPHONE (OUTPATIENT)
Dept: INTERNAL MEDICINE CLINIC | Facility: CLINIC | Age: 66
End: 2021-06-07

## 2021-06-07 DIAGNOSIS — M54.2 NECK PAIN: Primary | ICD-10-CM
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
## 2021-06-09 NOTE — TELEPHONE ENCOUNTER
Loly Ruiz with [facility] is requesting a copy of patient's order.  Please fax to [phone]

## 2021-06-21 RX ORDER — ESTRADIOL AND NORETHINDRONE ACETATE .5; .1 MG/1; MG/1
1 TABLET ORAL DAILY
Qty: 84 TABLET | Refills: 0 | Status: SHIPPED | OUTPATIENT
Start: 2021-06-21 | End: 2021-09-10

## 2021-06-24 ENCOUNTER — PATIENT MESSAGE (OUTPATIENT)
Dept: INTERNAL MEDICINE CLINIC | Facility: CLINIC | Age: 66
End: 2021-06-24

## 2021-06-24 RX ORDER — CLOBETASOL PROPIONATE 0.5 MG/G
AEROSOL, FOAM TOPICAL
Qty: 50 G | Refills: 0 | Status: SHIPPED | OUTPATIENT
Start: 2021-06-24 | End: 2021-06-25

## 2021-06-24 NOTE — TELEPHONE ENCOUNTER
From: Eli Monroy  To: Bradley Kim MD  Sent: 6/24/2021 7:33 AM CDT  Subject: Prescription Question    Please call in refill:  levothyroxine 0.125mg # 90.     [id number]     Thank you, Bree Mcarthur [birth year]

## 2021-06-25 RX ORDER — CLOBETASOL PROPIONATE 0.5 MG/G
AEROSOL, FOAM TOPICAL
Qty: 50 G | Refills: 2 | Status: SHIPPED | OUTPATIENT
Start: 2021-06-25 | End: 2021-11-09

## 2021-06-28 ENCOUNTER — TELEPHONE (OUTPATIENT)
Dept: PAIN CLINIC | Facility: HOSPITAL | Age: 66
End: 2021-06-28

## 2021-07-06 RX ORDER — OMEPRAZOLE 40 MG/1
40 CAPSULE, DELAYED RELEASE ORAL DAILY
Qty: 30 CAPSULE | Refills: 2 | OUTPATIENT
Start: 2021-07-06 | End: 2021-10-04

## 2021-07-06 NOTE — TELEPHONE ENCOUNTER
•  Omeprazole 40 MG Oral Capsule Delayed Release, Take 1 capsule (40 mg total) by mouth daily. , Disp: 30 capsule, Rfl: 2

## 2021-07-07 RX ORDER — OMEPRAZOLE 40 MG/1
40 CAPSULE, DELAYED RELEASE ORAL DAILY
Qty: 30 CAPSULE | Refills: 2 | Status: SHIPPED | OUTPATIENT
Start: 2021-07-07 | End: 2021-08-09

## 2021-07-07 NOTE — TELEPHONE ENCOUNTER
The patient stated she takes Omeprazole she takes 20 mg in the am and 40 in the evening since having Covid. The cough has been much much better. Dr. Brando Lopez placed her on. She is not taking Famotidine. I updated medication record.
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
## 2021-07-07 NOTE — TELEPHONE ENCOUNTER
Ask her to try to wean off to omeprazole 40mg daily   Does she have enough of the  the 20mg at home ?

## 2021-07-08 RX ORDER — LORAZEPAM 1 MG/1
1 TABLET ORAL 3 TIMES DAILY
Qty: 90 TABLET | Refills: 0 | Status: SHIPPED | OUTPATIENT
Start: 2021-07-08 | End: 2021-08-09

## 2021-07-08 NOTE — TELEPHONE ENCOUNTER
Noted.    Future Appointments   Date Time Provider Shaun Castle   8/9/2021 11:30 AM Delaney Mar MD [facility] Tesha Smart

## 2021-07-08 NOTE — TELEPHONE ENCOUNTER
Patient was patient of Dr. Surinder Briggs    Will establish care with Dr. Araseli Gabriel on 08/09/2021    Requesting refill of her Lorazepam. Medication and instruction verifies.  Med pended

## 2021-07-28 ENCOUNTER — PATIENT MESSAGE (OUTPATIENT)
Dept: INTERNAL MEDICINE CLINIC | Facility: CLINIC | Age: 66
End: 2021-07-28

## 2021-07-28 NOTE — TELEPHONE ENCOUNTER
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 It has flared badly recently.  I need refills for:     Clobetasol Proprionate Foam

## 2021-07-29 ENCOUNTER — PATIENT MESSAGE (OUTPATIENT)
Dept: INTERNAL MEDICINE CLINIC | Facility: CLINIC | Age: 66
End: 2021-07-29

## 2021-07-29 RX ORDER — FLUOCINONIDE 0.5 MG/ML
1 SOLUTION TOPICAL DAILY
Qty: 60 ML | Refills: 0 | Status: SHIPPED | OUTPATIENT
Start: 2021-07-29 | End: 2021-09-09

## 2021-07-29 RX ORDER — AZELAIC ACID 0.15 G/G
GEL TOPICAL
Qty: 1 EACH | Refills: 2 | Status: SHIPPED | OUTPATIENT
Start: 2021-07-29

## 2021-07-29 NOTE — TELEPHONE ENCOUNTER
From: Connie Hay  To: Sammy Pereira MD  Sent: 7/29/2021 3:34 PM CDT  Subject: Prescription Question    Crowmejuarez Just,    Thanks much. I requested the Finacea GEL, 15%   Sorry for the misunderstanding.  [facility]

## 2021-07-29 NOTE — TELEPHONE ENCOUNTER
Spoke to [employer] [facility] at [facility] indicated that the Fluocinonide 0.05 % External Solution is ready to be picked up and the Clobetasol Propionate 0.05 % External Foam was coming up as a refill too soon when the patient requested, but will fill it now for

## 2021-08-09 ENCOUNTER — OFFICE VISIT (OUTPATIENT)
Dept: INTERNAL MEDICINE CLINIC | Facility: CLINIC | Age: 66
End: 2021-08-09
Payer: MEDICARE

## 2021-08-09 ENCOUNTER — LAB ENCOUNTER (OUTPATIENT)
Dept: LAB | Age: 66
End: 2021-08-09
Attending: INTERNAL MEDICINE
Payer: MEDICARE

## 2021-08-09 VITALS
BODY MASS INDEX: 23.36 KG/M2 | DIASTOLIC BLOOD PRESSURE: 67 MMHG | HEIGHT: 64 IN | WEIGHT: 136.81 LBS | SYSTOLIC BLOOD PRESSURE: 99 MMHG | HEART RATE: 82 BPM

## 2021-08-09 DIAGNOSIS — M32.9 LUPUS (HCC): ICD-10-CM

## 2021-08-09 DIAGNOSIS — E03.9 ACQUIRED HYPOTHYROIDISM: ICD-10-CM

## 2021-08-09 DIAGNOSIS — E55.9 VITAMIN D DEFICIENCY: ICD-10-CM

## 2021-08-09 DIAGNOSIS — M25.50 PAIN IN JOINT, MULTIPLE SITES: Primary | ICD-10-CM

## 2021-08-09 DIAGNOSIS — K21.00 GASTROESOPHAGEAL REFLUX DISEASE WITH ESOPHAGITIS WITHOUT HEMORRHAGE: ICD-10-CM

## 2021-08-09 DIAGNOSIS — G43.009 MIGRAINE WITHOUT AURA AND WITHOUT STATUS MIGRAINOSUS, NOT INTRACTABLE: ICD-10-CM

## 2021-08-09 PROBLEM — R21 RASH: Status: RESOLVED | Noted: 2020-04-20 | Resolved: 2021-08-09

## 2021-08-09 PROBLEM — N30.00 ACUTE CYSTITIS WITHOUT HEMATURIA: Status: RESOLVED | Noted: 2020-04-20 | Resolved: 2021-08-09

## 2021-08-09 PROBLEM — R06.02 SOB (SHORTNESS OF BREATH): Status: RESOLVED | Noted: 2021-04-06 | Resolved: 2021-08-09

## 2021-08-09 LAB
ALBUMIN SERPL-MCNC: 3.6 G/DL (ref 3.4–5)
ALBUMIN/GLOB SERPL: 1.1 {RATIO} (ref 1–2)
ALP LIVER SERPL-CCNC: 66 U/L
ALT SERPL-CCNC: 25 U/L
ANION GAP SERPL CALC-SCNC: 6 MMOL/L (ref 0–18)
AST SERPL-CCNC: 19 U/L (ref 15–37)
BILIRUB SERPL-MCNC: 0.3 MG/DL (ref 0.1–2)
BUN BLD-MCNC: 16 MG/DL (ref 7–18)
BUN/CREAT SERPL: 27.1 (ref 10–20)
CALCIUM BLD-MCNC: 8.3 MG/DL (ref 8.5–10.1)
CHLORIDE SERPL-SCNC: 108 MMOL/L (ref 98–112)
CO2 SERPL-SCNC: 26 MMOL/L (ref 21–32)
CREAT BLD-MCNC: 0.59 MG/DL
DEPRECATED RDW RBC AUTO: 45.9 FL (ref 35.1–46.3)
ERYTHROCYTE [DISTWIDTH] IN BLOOD BY AUTOMATED COUNT: 13.2 % (ref 11–15)
GLOBULIN PLAS-MCNC: 3.4 G/DL (ref 2.8–4.4)
GLUCOSE BLD-MCNC: 77 MG/DL (ref 70–99)
HCT VFR BLD AUTO: 39.2 %
HGB BLD-MCNC: 12.6 G/DL
M PROTEIN MFR SERPL ELPH: 7 G/DL (ref 6.4–8.2)
MCH RBC QN AUTO: 30.4 PG (ref 26–34)
MCHC RBC AUTO-ENTMCNC: 32.1 G/DL (ref 31–37)
MCV RBC AUTO: 94.7 FL
OSMOLALITY SERPL CALC.SUM OF ELEC: 290 MOSM/KG (ref 275–295)
PATIENT FASTING Y/N/NP: NO
PLATELET # BLD AUTO: 215 10(3)UL (ref 150–450)
POTASSIUM SERPL-SCNC: 3.9 MMOL/L (ref 3.5–5.1)
RBC # BLD AUTO: 4.14 X10(6)UL
SODIUM SERPL-SCNC: 140 MMOL/L (ref 136–145)
TSI SER-ACNC: 0.94 MIU/ML (ref 0.36–3.74)
VIT D+METAB SERPL-MCNC: 57.7 NG/ML (ref 30–100)
WBC # BLD AUTO: 5.7 X10(3) UL (ref 4–11)

## 2021-08-09 PROCEDURE — 82306 VITAMIN D 25 HYDROXY: CPT

## 2021-08-09 PROCEDURE — 99214 OFFICE O/P EST MOD 30 MIN: CPT | Performed by: INTERNAL MEDICINE

## 2021-08-09 PROCEDURE — 85027 COMPLETE CBC AUTOMATED: CPT

## 2021-08-09 PROCEDURE — 86038 ANTINUCLEAR ANTIBODIES: CPT

## 2021-08-09 PROCEDURE — 86039 ANTINUCLEAR ANTIBODIES (ANA): CPT

## 2021-08-09 PROCEDURE — 36415 COLL VENOUS BLD VENIPUNCTURE: CPT

## 2021-08-09 PROCEDURE — 80053 COMPREHEN METABOLIC PANEL: CPT

## 2021-08-09 PROCEDURE — 84443 ASSAY THYROID STIM HORMONE: CPT

## 2021-08-09 RX ORDER — LORAZEPAM 1 MG/1
1 TABLET ORAL NIGHTLY
Qty: 90 TABLET | Refills: 0 | COMMUNITY
Start: 2021-08-09 | End: 2021-08-11

## 2021-08-09 RX ORDER — OMEPRAZOLE 20 MG/1
20 CAPSULE, DELAYED RELEASE ORAL
Qty: 180 CAPSULE | Refills: 0 | Status: SHIPPED | OUTPATIENT
Start: 2021-08-09 | End: 2021-11-03

## 2021-08-09 NOTE — PATIENT INSTRUCTIONS
Arthralgia    Arthralgia is the term for pain in or around the joint. It is a symptom, not a disease. This pain may involve one or more joints. In some cases, the pain moves from joint to joint. There are many causes for joint pain.  These include:  · In · Don't have fatty foods or spicy foods. · Eat fewer acidic foods, such as citrus and tomato-based foods. These can increase symptoms. · Limit drinking alcohol, caffeine, and fizzy beverages. All increase acid reflux.   · Try limiting chocolate, peppermin

## 2021-08-09 NOTE — PROGRESS NOTES
Richard Mosley is a 60year old female.   Patient presents with:  Establish Care      HPI:   New patient-used to see Dr. Erica Murphy who is now retired  C/C establish care  C/O has pain from head to toe - feet , shoulder   She takes lorazepam also physical medicin Azelaic Acid (FINACEA) 15 % External Gel Apply topically daily.  1 each 2   • Clobetasol Propionate 0.05 % External Foam APPLY 1 GRAM TOPICALLY AS DIRECTED 50 g 2   • ESTRADIOL-NORETHINDRONE ACET 0.5-0.1 MG Oral Tab TAKE 1 TABLET BY MOUTH DAILY 84 tablet 0 pain on exertion, palpitations, swelling in feet  MUS: No back pain, joint pain, muscle pain  NEURO: +  Headaches- TN on the right and HA on the left  , anxiety, depression    EXAM:   BP 99/67   Pulse 82   Ht 5' 4\" (1.626 m)   Wt 136 lb 12.8 oz (62.1 kg) NEURO - INTERNAL  She was advised to follow-up with a neurologist by her previous PCP but did not get a chance to due to Covid so will refer          Preventative medicine   Pap- has cervical stricture so declining pap   Mammogram   cscope - she wants to h

## 2021-08-11 DIAGNOSIS — M25.50 PAIN IN JOINT, MULTIPLE SITES: ICD-10-CM

## 2021-08-11 RX ORDER — SUMATRIPTAN 5 MG/1
2 SPRAY NASAL EVERY 2 HOUR PRN
Qty: 18 EACH | Refills: 1 | Status: SHIPPED | OUTPATIENT
Start: 2021-08-11 | End: 2021-12-22

## 2021-08-11 RX ORDER — LORAZEPAM 1 MG/1
1 TABLET ORAL 3 TIMES DAILY
Qty: 270 TABLET | Refills: 1 | Status: SHIPPED | OUTPATIENT
Start: 2021-08-11 | End: 2021-09-09

## 2021-08-11 NOTE — TELEPHONE ENCOUNTER
Refill passed per [facility] protocol.      Requested Prescriptions   Pending Prescriptions Disp Refills    LORAZEPAM 1 MG Oral Tab [Pharmacy Med Name: LORAZEPAM 1MG TABLETS] 90 tablet 0     Sig: TAKE 1 TABLET(1 MG) BY MOUTH THREE TIMES DAILY

## 2021-08-11 NOTE — TELEPHONE ENCOUNTER
Please review; protocol failed/no protocol.      Requested Prescriptions   Pending Prescriptions Disp Refills    LORazepam 1 MG Oral Tab [Pharmacy Med Name: LORAZEPAM 1MG TABLETS] 270 tablet 1     Sig: Take 1 tablet (1 mg total) by mouth 3 (three) times el

## 2021-08-12 LAB — NUCLEAR IGG TITR SER IF: POSITIVE {TITER}

## 2021-08-14 LAB — ANA NUCLEOLAR TITR SER IF: 640 {TITER}

## 2021-08-16 ENCOUNTER — PATIENT MESSAGE (OUTPATIENT)
Dept: RHEUMATOLOGY | Facility: CLINIC | Age: 66
End: 2021-08-16

## 2021-08-16 NOTE — TELEPHONE ENCOUNTER
From: Sid Miguel  To: Dex Singletary MD  Sent: 8/16/2021 8:36 AM CDT  Subject: Non-Urgent Medical Question    Good Morning,   Recent bloodwork shows my TIRSO value has increased compared to values from last year--640 .??    Is that still in a range conside

## 2021-08-16 NOTE — TELEPHONE ENCOUNTER
Dr. Bakari Loera, please review patient message regarding labs below.      Component      Latest Ref Rng & Units 8/9/2021   Glucose      70 - 99 mg/dL 77   Sodium      136 - 145 mmol/L 140   Potassium      3.5 - 5.1 mmol/L 3.9   Chloride      98 - 112 mmol/L 108

## 2021-09-04 ENCOUNTER — PATIENT MESSAGE (OUTPATIENT)
Dept: RHEUMATOLOGY | Facility: CLINIC | Age: 66
End: 2021-09-04

## 2021-09-07 NOTE — TELEPHONE ENCOUNTER
From: Manan Ocampo  To: Aneta Figueroa MD  Sent: 9/4/2021 3:08 AM CDT  Subject: Non-Urgent Medical Question    Good Morning,   I want to schedule an appointment to assess Sjogrens and discuss possible benefit of Medrol Mahesh or resumption of methotrexate.

## 2021-09-07 NOTE — TELEPHONE ENCOUNTER
Dr. Rosa Maria Fenton, see patient update. Last seen May 2020. No follow up appt scheduled yet. Sent MyChart to remind patient to schedule appt to discuss.

## 2021-09-09 DIAGNOSIS — M25.50 PAIN IN JOINT, MULTIPLE SITES: ICD-10-CM

## 2021-09-09 RX ORDER — FLUOCINONIDE 0.5 MG/ML
1 SOLUTION TOPICAL DAILY
Qty: 60 ML | Refills: 0 | Status: SHIPPED | OUTPATIENT
Start: 2021-09-09 | End: 2021-09-23

## 2021-09-09 RX ORDER — LORAZEPAM 1 MG/1
1 TABLET ORAL 3 TIMES DAILY
Qty: 270 TABLET | Refills: 1 | Status: SHIPPED | OUTPATIENT
Start: 2021-09-09 | End: 2021-12-20

## 2021-09-10 RX ORDER — ESTRADIOL AND NORETHINDRONE ACETATE .5; .1 MG/1; MG/1
1 TABLET ORAL DAILY
Qty: 84 TABLET | Refills: 1 | Status: SHIPPED | OUTPATIENT
Start: 2021-09-10 | End: 2021-12-09

## 2021-09-10 NOTE — TELEPHONE ENCOUNTER
Current Outpatient Medications:   ••  ESTRADIOL-NORETHINDRONE ACET 0.5-0.1 MG Oral Tab, TAKE 1 TABLET BY MOUTH DAILY, Disp: 84 tablet, Rfl: 0

## 2021-09-23 RX ORDER — FLUOCINONIDE 0.5 MG/ML
SOLUTION TOPICAL
Qty: 60 ML | Refills: 0 | Status: SHIPPED | OUTPATIENT
Start: 2021-09-23 | End: 2021-11-09

## 2021-09-23 NOTE — TELEPHONE ENCOUNTER
Protocol failed or has No Protocol, please review  Requested Prescriptions   Pending Prescriptions Disp Refills    FLUOCINONIDE 0.05 % External Solution [Pharmacy Med Name: FLUOCINONIDE 0.05% SOLN 60ML] 60 mL 0     Sig: APPLY TOPICALLY TO THE AFFECTED AREA

## 2021-09-24 RX ORDER — LEVOTHYROXINE SODIUM 0.12 MG/1
125 TABLET ORAL
Qty: 90 TABLET | Refills: 1 | Status: SHIPPED | OUTPATIENT
Start: 2021-09-24 | End: 2021-12-20

## 2021-09-24 NOTE — TELEPHONE ENCOUNTER
Current Outpatient Medications:     Levothyroxine Sodium 125 MCG Oral Tab, Take 1 tablet (125 mcg total) by mouth before breakfast., Disp: 90 tablet, Rfl: 1

## 2021-09-24 NOTE — TELEPHONE ENCOUNTER
Refill passed per [facility] protocol.   Requested Prescriptions   Pending Prescriptions Disp Refills    levothyroxine 125 MCG Oral Tab 90 tablet 1     Sig: Take 1 tablet (125 mcg total) by mouth before breakfast.        Thyroid Medication Protocol Pas

## 2021-10-14 ENCOUNTER — PATIENT MESSAGE (OUTPATIENT)
Dept: INTERNAL MEDICINE CLINIC | Facility: CLINIC | Age: 66
End: 2021-10-14

## 2021-10-14 RX ORDER — AZELASTINE 1 MG/ML
2 SPRAY, METERED NASAL 2 TIMES DAILY
Qty: 1 EACH | Refills: 1 | Status: SHIPPED | OUTPATIENT
Start: 2021-10-14 | End: 2021-12-20

## 2021-10-14 NOTE — TELEPHONE ENCOUNTER
From: Manan Ocampo  To: Tania Matthews MD  Sent: 10/14/2021 9:09 AM CDT  Subject: Medications      I am requesting a prescription that is new for your office. My sinus symptoms are bad this year and the Ipratropium nasal spray is not adequate.  In the pas

## 2021-11-03 DIAGNOSIS — K21.00 GASTROESOPHAGEAL REFLUX DISEASE WITH ESOPHAGITIS WITHOUT HEMORRHAGE: ICD-10-CM

## 2021-11-03 RX ORDER — OMEPRAZOLE 20 MG/1
20 CAPSULE, DELAYED RELEASE ORAL
Qty: 180 CAPSULE | Refills: 1 | Status: SHIPPED | OUTPATIENT
Start: 2021-11-03

## 2021-11-08 DIAGNOSIS — L40.9 PSORIASIS: Primary | ICD-10-CM

## 2021-11-09 RX ORDER — FLUOCINONIDE 0.5 MG/ML
1 SOLUTION TOPICAL DAILY
Qty: 60 ML | Refills: 0 | OUTPATIENT
Start: 2021-11-09

## 2021-11-09 RX ORDER — FLUOCINONIDE TOPICAL SOLUTION USP, 0.05% 0.5 MG/ML
SOLUTION TOPICAL
Qty: 60 ML | Refills: 0 | OUTPATIENT
Start: 2021-11-09

## 2021-11-09 RX ORDER — CLOBETASOL PROPIONATE 0.5 MG/G
AEROSOL, FOAM TOPICAL
Qty: 50 G | Refills: 2 | OUTPATIENT
Start: 2021-11-09

## 2021-11-09 RX ORDER — CLOBETASOL PROPIONATE 0.5 MG/G
AEROSOL, FOAM TOPICAL
Qty: 50 G | Refills: 0 | Status: SHIPPED | OUTPATIENT
Start: 2021-11-09

## 2021-11-09 RX ORDER — FLUOCINONIDE 0.5 MG/ML
1 SOLUTION TOPICAL DAILY
Qty: 60 ML | Refills: 0 | Status: SHIPPED | OUTPATIENT
Start: 2021-11-09

## 2021-11-09 NOTE — TELEPHONE ENCOUNTER
----- Message from Vanna Peterson sent at 11/8/2021  7:24 PM CST -----  Regarding: Refills  Hello,    Please send refills  for psoariasis medication for 3 mo. supply :     1)  Clobetasol Proprionate Foam 0.05%. 50 gr.       2)  Fluocinonide topical solut

## 2021-11-09 NOTE — TELEPHONE ENCOUNTER
I saw this pt once in aug and many issues were discussed at that visit   Does she have a derm ?  I did not see any notes   I am not sure of the coverage of these meds   She definitely needs a f/u

## 2021-11-09 NOTE — TELEPHONE ENCOUNTER
Advised patient of Dr. Kari Alejandre note.  Patient verbalized understanding  Patient prefers not to make an office visit since she uses these medication for scalp psoriasis (patient states she was diagnosed with condition over 20 years ago)  Patient states she

## 2021-11-09 NOTE — TELEPHONE ENCOUNTER
Please review; protocol failed. Requested Prescriptions   Pending Prescriptions Disp Refills    Fluocinonide 0.05 % External Solution 60 mL 0     Sig: Apply 1 Application topically daily.         There is no refill protocol information for this order

## 2021-11-09 NOTE — TELEPHONE ENCOUNTER
Placed referral for derm and refilled this one time   I would highly suggest she make an apt with physician of her choice for regular f/u as it is difficult to address all issues in one visit

## 2021-11-09 NOTE — TELEPHONE ENCOUNTER
The patient was informed with understanding. She stated her issues now deal with pain. She will follow up with Dr. Gonzalez Patricio in the future. She asked a [employer] message be sent.

## 2021-11-12 DIAGNOSIS — M25.50 PAIN IN JOINT, MULTIPLE SITES: ICD-10-CM

## 2021-11-12 RX ORDER — LORAZEPAM 1 MG/1
1 TABLET ORAL 3 TIMES DAILY
Qty: 270 TABLET | Refills: 1 | OUTPATIENT
Start: 2021-11-12

## 2021-11-26 ENCOUNTER — PATIENT MESSAGE (OUTPATIENT)
Dept: INTERNAL MEDICINE CLINIC | Facility: CLINIC | Age: 66
End: 2021-11-26

## 2021-11-26 DIAGNOSIS — Z23 NEED FOR VACCINATION: Primary | ICD-10-CM

## 2021-11-26 NOTE — TELEPHONE ENCOUNTER
From: Ayaka Donis  To: Adrien Bustos MD  Sent: 11/26/2021 11:05 AM CST  Subject: pneumovac    My 1st pneumonia vaccine was in Dec. 2019. Dr. Lianne Fulton had recommended 2nd dose after 2 yrs.  Do order this vaccine to receive in bloodlab, or do I make an a

## 2021-11-26 NOTE — TELEPHONE ENCOUNTER
Dr. Delvin Nava, please advise on an order  Vaccine record states:    [id number] 12/19/06  [id number] 12/12/19

## 2021-11-26 NOTE — TELEPHONE ENCOUNTER
From: Marissa Led  To:  Onnie Eisenmenger, MD  Sent: 11/26/2021 10:47 AM CST  Subject: Booster- shot #3    Hi, My TIRSO is up and I have been diagnosed with autoimmune disorders in the past.   1st-3/25,2021   2nd-4/22/2021   How can I schedule booster at [facility]

## 2021-12-02 ENCOUNTER — NURSE TRIAGE (OUTPATIENT)
Dept: INTERNAL MEDICINE CLINIC | Facility: CLINIC | Age: 66
End: 2021-12-02

## 2021-12-02 ENCOUNTER — LAB ENCOUNTER (OUTPATIENT)
Dept: LAB | Age: 66
End: 2021-12-02
Attending: INTERNAL MEDICINE
Payer: MEDICARE

## 2021-12-02 DIAGNOSIS — N39.0 URINARY TRACT INFECTION WITHOUT HEMATURIA, SITE UNSPECIFIED: ICD-10-CM

## 2021-12-02 DIAGNOSIS — N39.0 URINARY TRACT INFECTION WITHOUT HEMATURIA, SITE UNSPECIFIED: Primary | ICD-10-CM

## 2021-12-02 PROCEDURE — 87086 URINE CULTURE/COLONY COUNT: CPT

## 2021-12-02 PROCEDURE — 81001 URINALYSIS AUTO W/SCOPE: CPT

## 2021-12-02 NOTE — TELEPHONE ENCOUNTER
Action Requested: Summary for Provider     []  Critical Lab, Recommendations Needed  [] Need Additional Advice  []   FYI    []   Need Orders  [] Need Medications Sent to Pharmacy  []  Other     SUMMARY: Patient requests UA and urine C & S for UTI symptoms

## 2021-12-02 NOTE — TELEPHONE ENCOUNTER
Spoke with patient ( verified) and relayed Dr. Dayana Retana message below--patient verbalizes understanding and agreement. Transferred to CSS to assist with physical. No further questions/concerns at this time.

## 2021-12-03 RX ORDER — CIPROFLOXACIN 250 MG/1
250 TABLET, FILM COATED ORAL 2 TIMES DAILY
Qty: 6 TABLET | Refills: 0 | Status: SHIPPED | OUTPATIENT
Start: 2021-12-03 | End: 2021-12-06

## 2021-12-09 DIAGNOSIS — N88.2 CERVICAL STRICTURE OR STENOSIS: Primary | ICD-10-CM

## 2021-12-09 RX ORDER — ESTRADIOL AND NORETHINDRONE ACETATE .5; .1 MG/1; MG/1
1 TABLET ORAL DAILY
Qty: 84 TABLET | Refills: 1 | Status: SHIPPED | OUTPATIENT
Start: 2021-12-09

## 2021-12-09 NOTE — TELEPHONE ENCOUNTER
Refilled this time as a temporary refill but she should follow-up with her GYN doctor regarding this to see if this is the best medicine for her at her age and especially since she does not have a Pap smear on file which she says is due to cervical strictu

## 2021-12-09 NOTE — TELEPHONE ENCOUNTER
NO PAP SMEAR on file. Please review; protocol failed/no protocol.      Requested Prescriptions   Pending Prescriptions Disp Refills    ESTRADIOL-NORETHINDRONE ACET 0.5-0.1 MG Oral Tab [Pharmacy Med Name: ESTRA 0.5MG/NORETH 0.1MG TB LOW STR] 84 tablet 1

## 2021-12-10 NOTE — TELEPHONE ENCOUNTER
The patient was called and informed. Phone number given for OB/GYN. She stated one reason she keeps taking if for her bone growth. She has multiple bone surgeries.

## 2021-12-13 ENCOUNTER — IMMUNIZATION (OUTPATIENT)
Dept: LAB | Facility: HOSPITAL | Age: 66
End: 2021-12-13
Attending: EMERGENCY MEDICINE
Payer: MEDICARE

## 2021-12-13 DIAGNOSIS — Z23 NEED FOR VACCINATION: Primary | ICD-10-CM

## 2021-12-13 PROCEDURE — 0064A SARSCOV2 VAC 50MCG/0.25ML IM: CPT

## 2021-12-18 ENCOUNTER — TELEMEDICINE (OUTPATIENT)
Dept: FAMILY MEDICINE CLINIC | Facility: CLINIC | Age: 66
End: 2021-12-18
Payer: MEDICARE

## 2021-12-18 ENCOUNTER — NURSE TRIAGE (OUTPATIENT)
Dept: INTERNAL MEDICINE CLINIC | Facility: CLINIC | Age: 66
End: 2021-12-18

## 2021-12-18 ENCOUNTER — PATIENT MESSAGE (OUTPATIENT)
Dept: INTERNAL MEDICINE CLINIC | Facility: CLINIC | Age: 66
End: 2021-12-18

## 2021-12-18 DIAGNOSIS — H92.01 OTALGIA OF RIGHT EAR: Primary | ICD-10-CM

## 2021-12-18 PROCEDURE — 99213 OFFICE O/P EST LOW 20 MIN: CPT | Performed by: PHYSICIAN ASSISTANT

## 2021-12-18 NOTE — TELEPHONE ENCOUNTER
See acute encounter 12/18/21    From: More Butler  To:  Lukas Zhao MD  Sent: 12/18/2021  7:00 AM CST  Subject: R ear pain/effusion/veritigo    Hello- I have been very sick-chills/fever etc.since receiving the [address] [facility] booster at [facility]

## 2021-12-18 NOTE — TELEPHONE ENCOUNTER
Action Requested: Summary for Provider     []  Critical Lab, Recommendations Needed  [] Need Additional Advice  []   FYI    []   Need Orders  [] Need Medications Sent to Pharmacy  []  Other     SUMMARY: Video visit scheduled for today with Zahida GARIBAY

## 2021-12-18 NOTE — TELEPHONE ENCOUNTER
----- Message from Jasmin Peterson sent at 12/18/2021  7:00 AM CST -----  Regarding: R ear pain/effusion/veritigo  Hello- I have been very sick-chills/fever etc.since receiving the Enrique Wagnero booster at [facility] on Monday, 12/13/21.   I

## 2021-12-19 ENCOUNTER — PATIENT MESSAGE (OUTPATIENT)
Dept: INTERNAL MEDICINE CLINIC | Facility: CLINIC | Age: 66
End: 2021-12-19

## 2021-12-19 DIAGNOSIS — M25.50 PAIN IN JOINT, MULTIPLE SITES: ICD-10-CM

## 2021-12-19 RX ORDER — AZELASTINE 1 MG/ML
2 SPRAY, METERED NASAL 2 TIMES DAILY
Qty: 1 EACH | Refills: 1 | Status: CANCELLED | OUTPATIENT
Start: 2021-12-19

## 2021-12-19 RX ORDER — LORAZEPAM 1 MG/1
1 TABLET ORAL 3 TIMES DAILY
Qty: 270 TABLET | Refills: 1 | Status: CANCELLED | OUTPATIENT
Start: 2021-12-19

## 2021-12-19 RX ORDER — LEVOTHYROXINE SODIUM 0.12 MG/1
125 TABLET ORAL
Qty: 90 TABLET | Refills: 1 | Status: CANCELLED | OUTPATIENT
Start: 2021-12-19

## 2021-12-19 NOTE — TELEPHONE ENCOUNTER
From: [facility]  To: Linnea Rogel MD  Sent: 12/19/2021 8:31 AM CST  Subject: New ( to you) RX    refill:   Mometasone Furoate Monohydrate Spray 50mcg  90 day supply  Use one spray in each nostril daily.   Last refill by Dr. Adron Najjar 9/22/20    Also

## 2021-12-19 NOTE — PROGRESS NOTES
HPI: HPI  Patient complains of right ear pain for the past 2-3 days ago. She also has dizziness this morning but resolved. Patient denies of chest pain, SOB, N/V/C/D, fever, dizziness, syncope, abdominal pain, cough, sore throat, runny nose, chills. done  Zoster Vaccines(2 of 3) due on 06/17/2013  Pap Smear,3 Years due on 11/11/2016  Colonoscopy due on 01/12/2020  Mammogram due on 08/05/2020  Annual Physical due on 12/12/2020  DEXA Scan Never done  Pneumococcal Vaccine: 65+ Years(2 of 2 - PPSV23) due Number of children: Not on file      Years of education: Not on file      Highest education level: Not on file    Occupational History      Not on file    Tobacco Use      Smoking status: Never Smoker      Smokeless tobacco: Never Used    Vaping Use Exam  Constitutional:       Appearance: Normal appearance. HENT:      Head: Normocephalic and atraumatic. Neurological:      Mental Status: She is alert.        Assessment and Plan[de-identified]     Problem List Items Addressed This Visit     None      Visit Oni Motley

## 2021-12-20 RX ORDER — LORAZEPAM 1 MG/1
1 TABLET ORAL 3 TIMES DAILY
Qty: 270 TABLET | Refills: 1 | Status: SHIPPED | OUTPATIENT
Start: 2021-12-20

## 2021-12-20 RX ORDER — MOMETASONE 50 UG/1
1 SPRAY, METERED NASAL DAILY
Qty: 51 G | Refills: 0 | Status: SHIPPED | OUTPATIENT
Start: 2021-12-20

## 2021-12-20 RX ORDER — AZELASTINE 1 MG/ML
2 SPRAY, METERED NASAL 2 TIMES DAILY
Qty: 1 EACH | Refills: 1 | Status: SHIPPED | OUTPATIENT
Start: 2021-12-20

## 2021-12-20 RX ORDER — LEVOTHYROXINE SODIUM 0.12 MG/1
125 TABLET ORAL
Qty: 90 TABLET | Refills: 1 | Status: SHIPPED | OUTPATIENT
Start: 2021-12-20

## 2021-12-22 RX ORDER — SUMATRIPTAN 5 MG/1
SPRAY NASAL
Qty: 3 EACH | Refills: 1 | Status: SHIPPED | OUTPATIENT
Start: 2021-12-22

## 2021-12-22 NOTE — TELEPHONE ENCOUNTER
Refill passed per [facility] protocol.   Requested Prescriptions   Pending Prescriptions Disp Refills    SUMATRIPTAN 5 MG/ACT Nasal Solution [Pharmacy Med Name: SUMATRIPTAN 5MG NASAL SPRAY(6 SPR)] 18 each 1     Sig: USE 2 SPRAYS NASALLY EVERY 2 HOURS A

## 2021-12-30 ENCOUNTER — OFFICE VISIT (OUTPATIENT)
Dept: ALLERGY | Facility: CLINIC | Age: 66
End: 2021-12-30
Payer: MEDICARE

## 2021-12-30 VITALS
SYSTOLIC BLOOD PRESSURE: 121 MMHG | TEMPERATURE: 98 F | BODY MASS INDEX: 24.14 KG/M2 | HEART RATE: 82 BPM | DIASTOLIC BLOOD PRESSURE: 79 MMHG | WEIGHT: 141.38 LBS | OXYGEN SATURATION: 98 % | HEIGHT: 64 IN

## 2021-12-30 DIAGNOSIS — Z88.1 ALLERGY TO ANTIBIOTIC: Primary | ICD-10-CM

## 2021-12-30 DIAGNOSIS — H69.80 EUSTACHIAN TUBE DYSFUNCTION, UNSPECIFIED LATERALITY: ICD-10-CM

## 2021-12-30 DIAGNOSIS — R42 VERTIGO: ICD-10-CM

## 2021-12-30 DIAGNOSIS — J30.81 ALLERGIC RHINITIS DUE TO ANIMAL HAIR AND DANDER: ICD-10-CM

## 2021-12-30 PROCEDURE — 99214 OFFICE O/P EST MOD 30 MIN: CPT | Performed by: ALLERGY & IMMUNOLOGY

## 2021-12-30 RX ORDER — LEVOCETIRIZINE DIHYDROCHLORIDE 5 MG/1
TABLET, FILM COATED ORAL
Qty: 90 TABLET | Refills: 0 | Status: SHIPPED | OUTPATIENT
Start: 2021-12-30

## 2021-12-30 RX ORDER — METHYLPREDNISOLONE 4 MG/1
TABLET ORAL
Qty: 21 TABLET | Refills: 0 | Status: SHIPPED | OUTPATIENT
Start: 2021-12-30 | End: 2022-01-04

## 2021-12-30 RX ORDER — LEVOCETIRIZINE DIHYDROCHLORIDE 5 MG/1
5 TABLET, FILM COATED ORAL NIGHTLY
Qty: 30 TABLET | Refills: 0 | Status: SHIPPED | OUTPATIENT
Start: 2021-12-30 | End: 2021-12-30

## 2021-12-30 NOTE — PATIENT INSTRUCTIONS
#1 antibiotic allergy  Prior rash on Bactrim in her late 25s early 35s. No blistering rashes no peeling skin rashes no anaphylactic reactions.   Due to history of increasing frequency of urinary tract infections patient was explored the option to see if sh

## 2021-12-30 NOTE — TELEPHONE ENCOUNTER
Patient last seen in Allergy today, 12/30/2021 for .  . .        Allergy to antibiotic  (primary encounter diagnosis)  Allergic rhinitis due to animal hair and dander  Eustachian tube dysfunction, unspecified laterality  Vertigo     90 day request for Xyzal

## 2021-12-30 NOTE — PROGRESS NOTES
Tyler Chaves is a 73year old female. HPI:   Patient presents with:   Follow - Up: pt wants to find out if she is allergic to bactrim since she gets frequent UTI's    Patient is a 78-year-old female who presents for follow-up with a chief complaint of Family History   Problem Relation Age of Onset   • Dementia Father         dementia induced by MRSA sepsis (cause of death)   • Infectious Disease Father         MRSA sepsis (cause of death)   • Breast Cancer Maternal Aunt 72   • Hypertension Mother    • • Vitamin D3 2000 units Oral Cap Take 2 Units by mouth daily.  Take 2 tablets daily         Allergies:    Gabapentin              OTHER (SEE COMMENTS)    Comment:Upper and lower extremity ataxia and cognitive             impairment  Marijuana (Cannabis* diagnosis)  Allergic rhinitis due to animal hair and dander  Eustachian tube dysfunction, unspecified laterality  Vertigo      #1 antibiotic allergy  Prior rash on Bactrim in her late 25s early 35s.   No blistering rashes no peeling skin rashes no anaphylac Patient's questions were answered in regards to medication administration and dosing and potential side effects.  Teaching was provided via the teach back method

## 2022-01-04 ENCOUNTER — OFFICE VISIT (OUTPATIENT)
Dept: INTERNAL MEDICINE CLINIC | Facility: CLINIC | Age: 67
End: 2022-01-04
Payer: MEDICARE

## 2022-01-04 VITALS
WEIGHT: 139.19 LBS | HEART RATE: 103 BPM | SYSTOLIC BLOOD PRESSURE: 129 MMHG | BODY MASS INDEX: 23.76 KG/M2 | HEIGHT: 64 IN | DIASTOLIC BLOOD PRESSURE: 82 MMHG

## 2022-01-04 DIAGNOSIS — R76.8 POSITIVE ANA (ANTINUCLEAR ANTIBODY): ICD-10-CM

## 2022-01-04 DIAGNOSIS — K21.00 GASTROESOPHAGEAL REFLUX DISEASE WITH ESOPHAGITIS WITHOUT HEMORRHAGE: Primary | ICD-10-CM

## 2022-01-04 DIAGNOSIS — G50.0 TRIGEMINAL NEURALGIA: ICD-10-CM

## 2022-01-04 DIAGNOSIS — Z91.89 AT RISK FOR DECREASED BONE DENSITY: ICD-10-CM

## 2022-01-04 DIAGNOSIS — M25.50 PAIN IN JOINT, MULTIPLE SITES: ICD-10-CM

## 2022-01-04 DIAGNOSIS — E03.9 ACQUIRED HYPOTHYROIDISM: ICD-10-CM

## 2022-01-04 DIAGNOSIS — Z78.0 ASYMPTOMATIC MENOPAUSAL STATE: ICD-10-CM

## 2022-01-04 DIAGNOSIS — G43.009 MIGRAINE WITHOUT AURA AND WITHOUT STATUS MIGRAINOSUS, NOT INTRACTABLE: ICD-10-CM

## 2022-01-04 PROCEDURE — 99214 OFFICE O/P EST MOD 30 MIN: CPT | Performed by: INTERNAL MEDICINE

## 2022-01-04 NOTE — PROGRESS NOTES
Collette Smaller is a 73year old female.   Patient presents with:  Physical: medicare      HPI:   Pt comes for f/u  C/c initially   C/o she states she had covid in nov 2020 and thinks she had non documented covid in march of 2021 due to symptoms but didn't laryngoscopy   Was in PT and doing core exercises and that increases the gerd   She states she was diag with lupus and saw rheum last yr   Has migraines and was supposed to f/u with neuro but then got covid      She states she is too stressed and too much History:   Diagnosis Date   • Arthritis    • Autoimmune disorder ([facility] [address] 2003   • Bunion of right foot     R bunionectomy   • Cervical disc displacement     C4-C5, C5-C6 disc displacement   • Difficulty urinating    • Hiatal hernia with gastroesophageal refl all orders for this visit:    Gastroesophageal reflux disease with esophagitis without hemorrhage  -     GASTRO - INTERNAL  Will refer , although right now she is declining a colonoscopy she is willing to go for the endoscopy--she is wondering if her PPI n

## 2022-01-15 ENCOUNTER — TELEPHONE (OUTPATIENT)
Dept: INTERNAL MEDICINE CLINIC | Facility: CLINIC | Age: 67
End: 2022-01-15

## 2022-01-15 ENCOUNTER — PATIENT MESSAGE (OUTPATIENT)
Dept: INTERNAL MEDICINE CLINIC | Facility: CLINIC | Age: 67
End: 2022-01-15

## 2022-01-15 ENCOUNTER — HOSPITAL ENCOUNTER (EMERGENCY)
Facility: HOSPITAL | Age: 67
Discharge: HOME | End: 2022-01-15
Attending: EMERGENCY MEDICINE
Payer: MEDICARE

## 2022-01-15 ENCOUNTER — APPOINTMENT (OUTPATIENT)
Dept: CT IMAGING | Facility: HOSPITAL | Age: 67
End: 2022-01-15
Attending: EMERGENCY MEDICINE
Payer: MEDICARE

## 2022-01-15 VITALS
TEMPERATURE: 97 F | HEIGHT: 64 IN | DIASTOLIC BLOOD PRESSURE: 70 MMHG | SYSTOLIC BLOOD PRESSURE: 113 MMHG | BODY MASS INDEX: 23.05 KG/M2 | WEIGHT: 135 LBS | OXYGEN SATURATION: 100 % | HEART RATE: 76 BPM | RESPIRATION RATE: 18 BRPM

## 2022-01-15 DIAGNOSIS — K80.20 CALCULUS OF GALLBLADDER WITHOUT CHOLECYSTITIS WITHOUT OBSTRUCTION: ICD-10-CM

## 2022-01-15 DIAGNOSIS — R10.11 ABDOMINAL PAIN, RIGHT UPPER QUADRANT: Primary | ICD-10-CM

## 2022-01-15 LAB
ALBUMIN SERPL-MCNC: 3.7 G/DL (ref 3.4–5)
ALP LIVER SERPL-CCNC: 60 U/L
ALT SERPL-CCNC: 21 U/L
ANION GAP SERPL CALC-SCNC: 5 MMOL/L (ref 0–18)
AST SERPL-CCNC: 14 U/L (ref 15–37)
BASOPHILS # BLD AUTO: 0.04 X10(3) UL (ref 0–0.2)
BASOPHILS NFR BLD AUTO: 0.4 %
BILIRUB DIRECT SERPL-MCNC: 0.1 MG/DL (ref 0–0.2)
BILIRUB SERPL-MCNC: 0.3 MG/DL (ref 0.1–2)
BILIRUB UR QL: NEGATIVE
BUN BLD-MCNC: 18 MG/DL (ref 7–18)
BUN/CREAT SERPL: 27.3 (ref 10–20)
CALCIUM BLD-MCNC: 9 MG/DL (ref 8.5–10.1)
CHLORIDE SERPL-SCNC: 107 MMOL/L (ref 98–112)
CLARITY UR: CLEAR
CO2 SERPL-SCNC: 29 MMOL/L (ref 21–32)
COLOR UR: YELLOW
CREAT BLD-MCNC: 0.66 MG/DL
DEPRECATED RDW RBC AUTO: 47.4 FL (ref 35.1–46.3)
EOSINOPHIL # BLD AUTO: 0.02 X10(3) UL (ref 0–0.7)
EOSINOPHIL NFR BLD AUTO: 0.2 %
ERYTHROCYTE [DISTWIDTH] IN BLOOD BY AUTOMATED COUNT: 13.4 % (ref 11–15)
GLUCOSE BLD-MCNC: 82 MG/DL (ref 70–99)
GLUCOSE UR-MCNC: NEGATIVE MG/DL
HCT VFR BLD AUTO: 40.6 %
HGB BLD-MCNC: 13 G/DL
IMM GRANULOCYTES # BLD AUTO: 0.03 X10(3) UL (ref 0–1)
IMM GRANULOCYTES NFR BLD: 0.3 %
KETONES UR-MCNC: NEGATIVE MG/DL
LEUKOCYTE ESTERASE UR QL STRIP.AUTO: NEGATIVE
LIPASE SERPL-CCNC: 130 U/L (ref 73–393)
LYMPHOCYTES # BLD AUTO: 3.99 X10(3) UL (ref 1–4)
LYMPHOCYTES NFR BLD AUTO: 41.4 %
MCH RBC QN AUTO: 30.7 PG (ref 26–34)
MCHC RBC AUTO-ENTMCNC: 32 G/DL (ref 31–37)
MCV RBC AUTO: 96 FL
MONOCYTES # BLD AUTO: 0.61 X10(3) UL (ref 0.1–1)
MONOCYTES NFR BLD AUTO: 6.3 %
NEUTROPHILS # BLD AUTO: 4.94 X10 (3) UL (ref 1.5–7.7)
NEUTROPHILS # BLD AUTO: 4.94 X10(3) UL (ref 1.5–7.7)
NEUTROPHILS NFR BLD AUTO: 51.4 %
NITRITE UR QL STRIP.AUTO: NEGATIVE
OSMOLALITY SERPL CALC.SUM OF ELEC: 293 MOSM/KG (ref 275–295)
PH UR: 7 [PH] (ref 5–8)
PLATELET # BLD AUTO: 227 10(3)UL (ref 150–450)
POTASSIUM SERPL-SCNC: 3.5 MMOL/L (ref 3.5–5.1)
PROT SERPL-MCNC: 7.1 G/DL (ref 6.4–8.2)
PROT UR-MCNC: NEGATIVE MG/DL
RBC # BLD AUTO: 4.23 X10(6)UL
SODIUM SERPL-SCNC: 141 MMOL/L (ref 136–145)
SP GR UR STRIP: 1 (ref 1–1.03)
TROPONIN I HIGH SENSITIVITY: 5 NG/L
UROBILINOGEN UR STRIP-ACNC: <2
WBC # BLD AUTO: 9.6 X10(3) UL (ref 4–11)

## 2022-01-15 PROCEDURE — 80076 HEPATIC FUNCTION PANEL: CPT | Performed by: EMERGENCY MEDICINE

## 2022-01-15 PROCEDURE — 36415 COLL VENOUS BLD VENIPUNCTURE: CPT

## 2022-01-15 PROCEDURE — 93005 ELECTROCARDIOGRAM TRACING: CPT

## 2022-01-15 PROCEDURE — 74177 CT ABD & PELVIS W/CONTRAST: CPT | Performed by: EMERGENCY MEDICINE

## 2022-01-15 PROCEDURE — 93010 ELECTROCARDIOGRAM REPORT: CPT | Performed by: EMERGENCY MEDICINE

## 2022-01-15 PROCEDURE — 85025 COMPLETE CBC W/AUTO DIFF WBC: CPT | Performed by: EMERGENCY MEDICINE

## 2022-01-15 PROCEDURE — 84484 ASSAY OF TROPONIN QUANT: CPT | Performed by: EMERGENCY MEDICINE

## 2022-01-15 PROCEDURE — 83690 ASSAY OF LIPASE: CPT | Performed by: EMERGENCY MEDICINE

## 2022-01-15 PROCEDURE — 80048 BASIC METABOLIC PNL TOTAL CA: CPT | Performed by: EMERGENCY MEDICINE

## 2022-01-15 PROCEDURE — 81001 URINALYSIS AUTO W/SCOPE: CPT | Performed by: EMERGENCY MEDICINE

## 2022-01-15 PROCEDURE — 99284 EMERGENCY DEPT VISIT MOD MDM: CPT

## 2022-01-15 RX ORDER — DICYCLOMINE HCL 20 MG
20 TABLET ORAL 4 TIMES DAILY PRN
Qty: 20 TABLET | Refills: 0 | Status: SHIPPED | OUTPATIENT
Start: 2022-01-15 | End: 2022-01-20

## 2022-01-15 NOTE — TELEPHONE ENCOUNTER
Patient calling, identified name and , has been NPO as directed per Dr. Lelo Frank at [facility] , there are no appts open     Pain has subsided at this time ( since NPO )     Advised to go to ER if pain worsens   Informed no one besides patient will be allowed en

## 2022-01-15 NOTE — TELEPHONE ENCOUNTER
Raymundo Stoddard to patient at 3 AM describes sudden onset of right-sided mid chest pain which comes and goes in the waves was very intense for a while slightly subsided by the time she is talking to me.   States that he has history of gallstones never had sympt

## 2022-01-15 NOTE — ED PROVIDER NOTES
Patient Seen in: [facility] AND [facility] Emergency Department      History   Patient presents with:  Abdominal Pain    Stated Complaint: gallbladder attack    Subjective:   HPI    Patient is 43-year-old female who was awakened at 2 AM with right upper quadrant Exam     ED Triage Vitals [01/15/22 1149]   /78   Pulse 78   Resp 18   Temp 97 °F (36.1 °C)   Temp src Temporal   SpO2 100 %   O2 Device None (Room air)       Current:/78   Pulse 78   Temp 97 °F (36.1 °C) (Temporal)   Resp 18   Ht 162.6 cm (5' Urine Small (*)     All other components within normal limits   CBC W/ DIFFERENTIAL - Abnormal; Notable for the following components:    RDW-SD 47.4 (*)     All other components within normal limits   LIPASE - Normal   TROPONIN I HIGH SENSITIVITY - Normal months to obtain basic health screening including reassessment of your blood pressure.       Medications Prescribed:  Current Discharge Medication List

## 2022-01-17 ENCOUNTER — OFFICE VISIT (OUTPATIENT)
Dept: INTERNAL MEDICINE CLINIC | Facility: CLINIC | Age: 67
End: 2022-01-17
Payer: MEDICARE

## 2022-01-17 VITALS
BODY MASS INDEX: 23.73 KG/M2 | DIASTOLIC BLOOD PRESSURE: 79 MMHG | HEIGHT: 64 IN | RESPIRATION RATE: 16 BRPM | WEIGHT: 139 LBS | SYSTOLIC BLOOD PRESSURE: 126 MMHG | HEART RATE: 86 BPM

## 2022-01-17 DIAGNOSIS — K76.89 LIVER CYST: ICD-10-CM

## 2022-01-17 DIAGNOSIS — K80.71 CALCULUS OF GALLBLADDER AND BILE DUCT WITH OBSTRUCTION WITHOUT CHOLECYSTITIS: Primary | ICD-10-CM

## 2022-01-17 DIAGNOSIS — K21.9 GASTROESOPHAGEAL REFLUX DISEASE, UNSPECIFIED WHETHER ESOPHAGITIS PRESENT: ICD-10-CM

## 2022-01-17 PROBLEM — F13.99 SEDATIVE, HYPNOTIC OR ANXIOLYTIC USE, UNSPECIFIED WITH UNSPECIFIED SEDATIVE, HYPNOTIC OR ANXIOLYTIC-INDUCED DISORDER (HCC): Status: ACTIVE | Noted: 2022-01-17

## 2022-01-17 PROCEDURE — 99214 OFFICE O/P EST MOD 30 MIN: CPT | Performed by: INTERNAL MEDICINE

## 2022-01-17 NOTE — TELEPHONE ENCOUNTER
PT Kala Gtz,  57, RE ACUTE RIGHT SIDE PAIN,  [phone]  Paged at number :  PAGE: [phone] at : QCN-  02:52

## 2022-01-17 NOTE — PROGRESS NOTES
Monster Pool is a 73year old female. Patient presents with:  ER F/U      HPI:   Patient comes for follow-up   C/C ER follow-up  C/O RUQ pain and went to the ER 1/15/2021 --- reviewed ER notes  There is no nausea no vomiting.   It last about an hour No at [address] - light adjusting lens and is having trouble with it but does have an upcoming apt   Noted she was  on omeprazole and famotidine but stopped the famotidine   Had sob and dry cough after covid and saw dr Tamiko Caraballo and the increased ppi helpe 1   • Fluocinonide 0.05 % External Solution Apply 1 Application topically daily.  60 mL 0   • Clobetasol Propionate 0.05 % External Foam APPLY 1 GRAM TOPICALLY AS DIRECTED 50 g 0   • omeprazole 20 MG Oral Capsule Delayed Release Take 1 capsule (20 mg total) vomiting  : No Pain on urination, change in the color of urine, discharge, urinating frequently  NEURO: denies headaches , anxiety, depression    EXAM:   /79 (BP Location: Right arm, Patient Position: Sitting, Cuff Size: adult)   Pulse 86   Resp 16

## 2022-01-24 ENCOUNTER — OFFICE VISIT (OUTPATIENT)
Dept: SURGERY | Facility: CLINIC | Age: 67
End: 2022-01-24
Payer: MEDICARE

## 2022-01-24 VITALS — WEIGHT: 139 LBS | BODY MASS INDEX: 24 KG/M2

## 2022-01-24 DIAGNOSIS — K81.1 CHRONIC CHOLECYSTITIS: Primary | ICD-10-CM

## 2022-01-24 PROCEDURE — 99204 OFFICE O/P NEW MOD 45 MIN: CPT | Performed by: SURGERY

## 2022-01-24 NOTE — H&P
History and Physical      Bryant Zhou is a 73year old female. HPI   Patient presents with:  Gallbladder: Patient has complaints of an episode of severe pain approximately 10 days ago, described as sharp. Feels well now.   CT shows multiple gallst Nasal route 2 (two) times daily. 1 each 1   • levothyroxine 125 MCG Oral Tab Take 1 tablet (125 mcg total) by mouth before breakfast. 90 tablet 1   • LORazepam 1 MG Oral Tab Take 1 tablet (1 mg total) by mouth 3 (three) times daily.  270 tablet 1   • Estrad Cancer Maternal Aunt 72   • Hypertension Mother    • Obesity Mother    • Fibromyalgia Sister    • Alcohol and Other Disorders Associated Brother         alcoholism   • Depression Other         family h/o   • Other (drug use) Brother        Review of System incompletely distended.  No wall thickening.  No pericholecystic fluid.  Common bile duct is top normal in size at 6 mm and otherwise tapers to the ampulla.    SPLEEN: No enlargement or focal lesion.     STOMACH: No gross gastric mass, obstruction or focal

## 2022-02-15 RX ORDER — AZELASTINE 1 MG/ML
2 SPRAY, METERED NASAL 2 TIMES DAILY
Qty: 30 ML | Refills: 1 | Status: SHIPPED | OUTPATIENT
Start: 2022-02-15 | End: 2022-02-16

## 2022-02-15 NOTE — TELEPHONE ENCOUNTER
Refill passed per [address] [place] protocol.   Requested Prescriptions   Pending Prescriptions Disp Refills    AZELASTINE HCL 0.1 % Nasal Solution [Pharmacy Med Name: AZELASTINE 0.1%(137MCG) NASAL-200SP] 30 mL 0     Sig: USE 2 SPRAYS IN EACH NOSTRIL TWICE DAILY        Allergy Medication Protocol Passed - 2/15/2022  3:51 AM        Passed - Appoinment in past 12 or next 3 months            Recent Outpatient Visits              3 weeks ago Chronic cholecystitis    [facility] Shelbi Phillips MD    Office Visit    4 weeks ago Calculus of gallbladder and bile duct with obstruction without cholecystitis    [address] Romina Chandra MD    Office Visit    1 month ago Gastroesophageal reflux disease with esophagitis without hemorrhage    [address] Romina Chandra MD    Office Visit    1 month ago Allergy to antibiotic    Maye Steinberg MD    Office Visit    1 month ago Otalgia of right ear    [address] [facility]

## 2022-02-18 ENCOUNTER — PATIENT MESSAGE (OUTPATIENT)
Dept: ALLERGY | Facility: CLINIC | Age: 67
End: 2022-02-18

## 2022-02-19 NOTE — TELEPHONE ENCOUNTER
RN left voicemail for patient regarding clarification over what medications she should be holding prior to oral challenge to bactrim. RN advised that per the list she provided us, she would need to hold benadryl 5 days prior to oral challenge. RN not familiar with what HRT medication is so we can not advise on that until further clarified. RN advised that at this time, she does not currently have an oral challenge for bactrim scheduled. RN advised that she will need to schedule appointment with a nurse because it is a specialty appointment. Provided call back number and office hours. From: Merrill Stevens  To: Miya Salinas MD  Sent: 2/18/2022 10:53 AM CST  Subject: Bactrim Oral Challenge    Hi,    I need to communicate with the nurse about preparing for the oral challenge for allergic reaction to Bactrim. I take:  omeprazole, bendryl, VitD3, HRT, motrin, pseudoephedrine. Which of these do I need to hold and how many days before the test should they be held?   Thanks,   Renate Peng MD

## 2022-03-04 ENCOUNTER — HOSPITAL ENCOUNTER (OUTPATIENT)
Dept: BONE DENSITY | Facility: HOSPITAL | Age: 67
Discharge: HOME | End: 2022-03-04
Attending: INTERNAL MEDICINE
Payer: MEDICARE

## 2022-03-04 DIAGNOSIS — Z78.0 ASYMPTOMATIC MENOPAUSAL STATE: ICD-10-CM

## 2022-03-04 DIAGNOSIS — Z91.89 AT RISK FOR DECREASED BONE DENSITY: ICD-10-CM

## 2022-03-04 PROCEDURE — 77080 DXA BONE DENSITY AXIAL: CPT | Performed by: INTERNAL MEDICINE

## 2022-03-15 RX ORDER — AZELASTINE 1 MG/ML
SPRAY, METERED NASAL
Qty: 90 ML | Refills: 0 | Status: SHIPPED | OUTPATIENT
Start: 2022-03-15 | End: 2022-12-17

## 2022-03-15 RX ORDER — AZELASTINE 1 MG/ML
SPRAY, METERED NASAL
Qty: 30 ML | Refills: 3 | OUTPATIENT
Start: 2022-03-15

## 2022-03-15 NOTE — TELEPHONE ENCOUNTER
Refill passed per [place] [facility] protocol.       Requested Prescriptions   Pending Prescriptions Disp Refills    AZELASTINE HCL 0.1 % Nasal Solution [Pharmacy Med Name: AZELASTINE 0.1%(137MCG) NASAL-200SP] 90 mL 0     Sig: USE 2 SPRAYS IN EACH NOSTRIL TWICE DAILY        Allergy Medication Protocol Passed - 3/15/2022  9:18 AM        Passed - Appoinment in past 12 or next 3 months           AZELASTINE HCL 0.1 % Nasal Solution [Pharmacy Med Name: AZELASTINE 0.1%(137MCG) NASAL-200SP] 30 mL 3     Sig: USE 2 SPRAYS IN EACH NOSTRIL TWICE DAILY        Allergy Medication Protocol Passed - 3/15/2022  9:18 AM        Passed - Appoinment in past 12 or next 3 months               Future Appointments         Provider Department Appt Notes    In 3 weeks Anatoliy Woods, Gela Copeland MD [place] [employer] [facility], [address], [place] Gastroesophageal reflux disease with esophagitis without hemorrhage & Hiatal Hernia / ER Follow Up - p# [phone]            Recent Outpatient Visits              1 month ago Chronic cholecystitis    [facility] Adán Carter MD    Office Visit    1 month ago Calculus of gallbladder and bile duct with obstruction without cholecystitis    [facility], [facility], [address], Paige Lombard, MD    Office Visit    2 months ago Gastroesophageal reflux disease with esophagitis without hemorrhage    Reed Delarosa MD    Office Visit    2 months ago Allergy to antibiotic    Ginny Rush MD    Office Visit    2 months ago Otalgia of right ear    [address], [place]    Telemedicine

## 2022-04-05 ENCOUNTER — OFFICE VISIT (OUTPATIENT)
Dept: GASTROENTEROLOGY | Facility: CLINIC | Age: 67
End: 2022-04-05
Payer: MEDICARE

## 2022-04-05 ENCOUNTER — TELEPHONE (OUTPATIENT)
Dept: GASTROENTEROLOGY | Facility: CLINIC | Age: 67
End: 2022-04-05

## 2022-04-05 VITALS
DIASTOLIC BLOOD PRESSURE: 84 MMHG | SYSTOLIC BLOOD PRESSURE: 122 MMHG | HEIGHT: 64 IN | WEIGHT: 141 LBS | BODY MASS INDEX: 24.07 KG/M2

## 2022-04-05 DIAGNOSIS — Z12.11 COLON CANCER SCREENING: ICD-10-CM

## 2022-04-05 DIAGNOSIS — K21.9 GASTROESOPHAGEAL REFLUX DISEASE, UNSPECIFIED WHETHER ESOPHAGITIS PRESENT: Primary | ICD-10-CM

## 2022-04-05 DIAGNOSIS — K44.9 HIATAL HERNIA: ICD-10-CM

## 2022-04-05 DIAGNOSIS — K80.20 GALLSTONES: ICD-10-CM

## 2022-04-05 PROCEDURE — 99203 OFFICE O/P NEW LOW 30 MIN: CPT | Performed by: INTERNAL MEDICINE

## 2022-04-05 RX ORDER — FAMOTIDINE 20 MG/1
20 TABLET, FILM COATED ORAL NIGHTLY
Qty: 30 TABLET | Refills: 2 | Status: SHIPPED | OUTPATIENT
Start: 2022-04-05

## 2022-04-05 RX ORDER — ESTRADIOL AND NORETHINDRONE ACETATE .5; .1 MG/1; MG/1
1 TABLET ORAL DAILY
Qty: 84 TABLET | Refills: 1 | Status: SHIPPED | OUTPATIENT
Start: 2022-04-05

## 2022-04-05 NOTE — TELEPHONE ENCOUNTER
Patient seen in office today. Did not have calendar with her and would like to call the office once she knows dates that she can schedule. Scheduled for:  EGD [id number]  Provider Name:  Dr. Katherine Dias  Sedation:  MAC  Prep:  NPO after midnight  Meds/Allergies Reconciled?:  Physician reviewed  Diagnosis with codes:  GERD K21.9;  Hiatal hernia K44.9

## 2022-04-05 NOTE — PATIENT INSTRUCTIONS
GERD/hiatal hernia  - dietary changes  - omeprazole in am and famotidine at night   - [facility] with [facility]    Colon cancer screening  - Cologuard    Gallstones  - Dr. Katty Martínez follow up

## 2022-04-05 NOTE — TELEPHONE ENCOUNTER
Patient seen in office today. Dr. Kit Gusman ordered Cologuard testing. Cologuard form filled out and faxed to [facility] along with copy of insurance cards. Fax: [phone]    Form sent to [facility].

## 2022-04-07 ENCOUNTER — TELEPHONE (OUTPATIENT)
Dept: INTERNAL MEDICINE CLINIC | Facility: CLINIC | Age: 67
End: 2022-04-07

## 2022-04-07 ENCOUNTER — OFFICE VISIT (OUTPATIENT)
Dept: INTERNAL MEDICINE CLINIC | Facility: CLINIC | Age: 67
End: 2022-04-07
Payer: MEDICARE

## 2022-04-07 VITALS
HEART RATE: 87 BPM | HEIGHT: 64 IN | SYSTOLIC BLOOD PRESSURE: 121 MMHG | WEIGHT: 139 LBS | BODY MASS INDEX: 23.73 KG/M2 | RESPIRATION RATE: 15 BRPM | DIASTOLIC BLOOD PRESSURE: 78 MMHG

## 2022-04-07 DIAGNOSIS — R21 RASH AND NONSPECIFIC SKIN ERUPTION: Primary | ICD-10-CM

## 2022-04-07 DIAGNOSIS — Z91.89: ICD-10-CM

## 2022-04-07 DIAGNOSIS — M54.12 CERVICAL NEURITIS: ICD-10-CM

## 2022-04-07 DIAGNOSIS — K21.00 GASTROESOPHAGEAL REFLUX DISEASE WITH ESOPHAGITIS WITHOUT HEMORRHAGE: ICD-10-CM

## 2022-04-07 DIAGNOSIS — R22.2 FULLNESS OF SUPRACLAVICULAR FOSSA: ICD-10-CM

## 2022-04-07 PROCEDURE — 99214 OFFICE O/P EST MOD 30 MIN: CPT | Performed by: INTERNAL MEDICINE

## 2022-04-07 RX ORDER — OMEPRAZOLE 20 MG/1
20 CAPSULE, DELAYED RELEASE ORAL EVERY MORNING
Qty: 180 CAPSULE | Refills: 1 | COMMUNITY
Start: 2022-04-07

## 2022-04-07 RX ORDER — METHYLPREDNISOLONE 4 MG/1
TABLET ORAL
Qty: 1 EACH | Refills: 0 | Status: SHIPPED | OUTPATIENT
Start: 2022-04-07

## 2022-04-07 NOTE — TELEPHONE ENCOUNTER
Hello,     I have an echart appt. scheduled with you for Friday April 8th. I think I need to see you in person instead. I woke this AM with pain, redness and heat of the Left neck and shoulder. Now the the painful,hot, red area is around my left ear and down my neck. I notice a supraclavicular fullness on the left. I have cancelled my appts. with Ortho. for Thurs. tomorrrow. Can I get into to see you Thurs. 4/7/22 ?   Trang Oveido will also call first thing in AM

## 2022-04-09 ENCOUNTER — PATIENT MESSAGE (OUTPATIENT)
Dept: INTERNAL MEDICINE CLINIC | Facility: CLINIC | Age: 67
End: 2022-04-09

## 2022-04-11 NOTE — TELEPHONE ENCOUNTER
From: Bryant Zhou  To: Sundeep Garzon MD  Sent: 4/9/2022 7:12 AM CDT  Subject: supraclavicular mass    Good Morning,   The swelling,redness and vessicles on the face are resolving with the Medrol Mahesh. The neck mass is prominant. My neck and back are better but the redness and pain persists. The CT is scheduled 4/15/22. It takes a long time to see a specialist. I am thinking I should get an appt. lined up. Which surgeon? Head and Neck or Thoracic? Have a good weekend.  Max Driver.

## 2022-04-15 ENCOUNTER — HOSPITAL ENCOUNTER (OUTPATIENT)
Dept: CT IMAGING | Facility: HOSPITAL | Age: 67
Discharge: HOME | End: 2022-04-15
Attending: INTERNAL MEDICINE
Payer: MEDICARE

## 2022-04-15 DIAGNOSIS — R22.2 FULLNESS OF SUPRACLAVICULAR FOSSA: ICD-10-CM

## 2022-04-15 LAB — CREAT BLD-MCNC: 0.7 MG/DL

## 2022-04-15 PROCEDURE — 82565 ASSAY OF CREATININE: CPT

## 2022-04-15 PROCEDURE — 70491 CT SOFT TISSUE NECK W/DYE: CPT | Performed by: INTERNAL MEDICINE

## 2022-04-18 ENCOUNTER — PATIENT MESSAGE (OUTPATIENT)
Dept: INTERNAL MEDICINE CLINIC | Facility: CLINIC | Age: 67
End: 2022-04-18

## 2022-04-18 NOTE — TELEPHONE ENCOUNTER
From: Wily Peterson  To: Didi Moser MD  Sent: 4/18/2022 3:25 PM CDT  Subject: L neck fullness    Sorry, I found the CT report right where it belongs, in the tests section. Thanks anyway.

## 2022-04-19 ENCOUNTER — PATIENT MESSAGE (OUTPATIENT)
Dept: INTERNAL MEDICINE CLINIC | Facility: CLINIC | Age: 67
End: 2022-04-19

## 2022-04-19 NOTE — TELEPHONE ENCOUNTER
From: Nabila Peterson  To: Sydni Peacock MD  Sent: 4/19/2022 6:50 AM CDT  Subject: Yue Mata received a message from [address] [place] [address] stating that I needed to schedule an appt. to discuss refills. Perhaps he was unaware of the appt. we had Wed. April 13.?

## 2022-04-20 PROBLEM — M54.12 CERVICAL NEURITIS: Status: ACTIVE | Noted: 2022-04-20

## 2022-04-20 PROBLEM — R22.2 FULLNESS OF SUPRACLAVICULAR FOSSA: Status: ACTIVE | Noted: 2022-04-20

## 2022-04-20 RX ORDER — LORAZEPAM 1 MG/1
1 TABLET ORAL 3 TIMES DAILY
Qty: 270 TABLET | Refills: 1 | Status: SHIPPED | OUTPATIENT
Start: 2022-04-20

## 2022-04-20 NOTE — TELEPHONE ENCOUNTER
Please review. Protocol failed or has no protocol. Requested Prescriptions   Pending Prescriptions Disp Refills    LORazepam 1 MG Oral Tab 270 tablet 1     Sig: Take 1 tablet (1 mg total) by mouth 3 (three) times daily.         There is no refill protocol information for this order           Recent Outpatient Visits              1 week ago Rash and nonspecific skin eruption    [address], Andi Phillip MD    Office Visit    2 weeks ago Gastroesophageal reflux disease, unspecified whether esophagitis present    [address], [address], John Sanders MD    Office Visit    2 months ago Chronic cholecystitis    [facility] Derrick Martínez MD    Office Visit    3 months ago Calculus of gallbladder and bile duct with obstruction without cholecystitis    Libertad Fuller MD    Office Visit    3 months ago Gastroesophageal reflux disease with esophagitis without hemorrhage    Libertad Fuller MD    Office Visit            Future Appointments         Provider Department Appt Notes    In 1 week [facility] MRI RM1 (1.5T WIDE) Dolan Carrel MRI Department Mobile

## 2022-04-22 ENCOUNTER — PATIENT MESSAGE (OUTPATIENT)
Dept: INTERNAL MEDICINE CLINIC | Facility: CLINIC | Age: 67
End: 2022-04-22

## 2022-04-28 ENCOUNTER — HOSPITAL ENCOUNTER (OUTPATIENT)
Dept: GENERAL RADIOLOGY | Age: 67
Discharge: HOME | End: 2022-04-28
Attending: PODIATRIST
Payer: MEDICARE

## 2022-04-28 ENCOUNTER — HOSPITAL ENCOUNTER (OUTPATIENT)
Dept: MRI IMAGING | Age: 67
Discharge: HOME | End: 2022-04-28
Attending: PODIATRIST
Payer: MEDICARE

## 2022-04-28 DIAGNOSIS — M25.871 OTHER SPECIFIED JOINT DISORDERS, RIGHT ANKLE AND FOOT: ICD-10-CM

## 2022-04-28 PROCEDURE — 73718 MRI LOWER EXTREMITY W/O DYE: CPT | Performed by: PODIATRIST

## 2022-04-28 PROCEDURE — 73620 X-RAY EXAM OF FOOT: CPT | Performed by: PODIATRIST

## 2022-04-29 DIAGNOSIS — K21.00 GASTROESOPHAGEAL REFLUX DISEASE WITH ESOPHAGITIS WITHOUT HEMORRHAGE: ICD-10-CM

## 2022-04-29 RX ORDER — OMEPRAZOLE 20 MG/1
20 CAPSULE, DELAYED RELEASE ORAL
Qty: 180 CAPSULE | Refills: 1 | Status: SHIPPED | OUTPATIENT
Start: 2022-04-29 | End: 2022-10-03 | Stop reason: ALTCHOICE

## 2022-04-29 NOTE — TELEPHONE ENCOUNTER
Refill passed per [place] [facility] protocol.     Requested Prescriptions   Pending Prescriptions Disp Refills    OMEPRAZOLE 20 MG Oral Capsule Delayed Release [Pharmacy Med Name: OMEPRAZOLE 20MG CAPSULES] 180 capsule 1     Sig: TAKE 1 CAPSULE(20 MG) BY MOUTH TWICE DAILY BEFORE MEALS        Gastrointestional Medication Protocol Passed - 4/29/2022  1:41 PM        Passed - Appointment in past 12 or next 3 months                  Recent Outpatient Visits              3 weeks ago Rash and nonspecific skin eruption    [facility], [address] Olivier MD    Office Visit    3 weeks ago Gastroesophageal reflux disease, unspecified whether esophagitis present    Giovani Zeng Catheline Donna, MD    Office Visit    3 months ago Chronic cholecystitis    [facility] for Danuta Roach MD    Office Visit    3 months ago Calculus of gallbladder and bile duct with obstruction without cholecystitis    John Ho MD    Office Visit    3 months ago Gastroesophageal reflux disease with esophagitis without hemorrhage    John Ho MD    Office Visit

## 2022-05-06 ENCOUNTER — TELEPHONE (OUTPATIENT)
Dept: GASTROENTEROLOGY | Facility: CLINIC | Age: 67
End: 2022-05-06

## 2022-05-10 RX ORDER — MOMETASONE FUROATE 50 UG/1
1 SPRAY, METERED NASAL DAILY
Qty: 51 G | Refills: 2 | Status: SHIPPED | OUTPATIENT
Start: 2022-05-10

## 2022-05-10 NOTE — TELEPHONE ENCOUNTER
Refill passed per [address] protocol. Requested Prescriptions   Pending Prescriptions Disp Refills    mometasone furoate 50 MCG/ACT Nasal Suspension 51 g 0     Si spray by Nasal route daily.         Allergy Medication Protocol Passed - 5/10/2022 11:58 AM        Passed - Appointment in past 12 or next 3 months                   Recent Outpatient Visits              1 month ago Rash and nonspecific skin eruption    [address] [facility], [address] [facility]    Office Visit    1 month ago Gastroesophageal reflux disease, unspecified whether esophagitis present    [address] [place] [address], [facility]    Office Visit    3 months ago Chronic cholecystitis    [facility] for Yolande Ward MD    Office Visit    3 months ago Calculus of gallbladder and bile duct with obstruction without cholecystitis    Lorie Rivera MD    Office Visit    4 months ago Gastroesophageal reflux disease with esophagitis without hemorrhage    Lorie Rivera MD    Office Visit

## 2022-05-11 ENCOUNTER — TELEMEDICINE (OUTPATIENT)
Dept: INTERNAL MEDICINE CLINIC | Facility: CLINIC | Age: 67
End: 2022-05-11
Payer: MEDICARE

## 2022-05-11 DIAGNOSIS — F32.A ANXIETY AND DEPRESSION: ICD-10-CM

## 2022-05-11 DIAGNOSIS — F41.9 ANXIETY AND DEPRESSION: ICD-10-CM

## 2022-05-11 DIAGNOSIS — G47.00 INSOMNIA, UNSPECIFIED TYPE: Primary | ICD-10-CM

## 2022-05-11 PROCEDURE — 99213 OFFICE O/P EST LOW 20 MIN: CPT | Performed by: INTERNAL MEDICINE

## 2022-05-11 RX ORDER — TRAZODONE HYDROCHLORIDE 50 MG/1
25 TABLET ORAL NIGHTLY
Qty: 90 TABLET | Refills: 3 | Status: SHIPPED | OUTPATIENT
Start: 2022-05-11

## 2022-05-20 ENCOUNTER — HOSPITAL ENCOUNTER (OUTPATIENT)
Dept: ULTRASOUND IMAGING | Age: 67
Discharge: HOME | End: 2022-05-20
Attending: INTERNAL MEDICINE
Payer: MEDICARE

## 2022-05-20 DIAGNOSIS — R22.1 NECK MASS: ICD-10-CM

## 2022-05-20 PROCEDURE — 76536 US EXAM OF HEAD AND NECK: CPT | Performed by: INTERNAL MEDICINE

## 2022-05-27 NOTE — TELEPHONE ENCOUNTER
KATHRYN Carcamo    I called the patient to make sure exact sciences reached out to send a new kit. Patient told me she has done this several times and the kit keeps arriving on a Saturday evening so she can't mail it back until Monday and then she gets the same notice each time. She even asked for them to mail it on a weekday and still got it on a weekend. She is not interested in trying again at this time. She told me she does not have the time right now and will call me back when she wants to give it another try. I offered to enter a recall to remind her in a few months to try again but she declined. She told me that she would call when she wants to proceed. Thank you        Per result:  \"The sample stability limit has been exceeded. The patient will need to be contacted to initiate a new sample collection. \"

## 2022-06-10 ENCOUNTER — HOSPITAL ENCOUNTER (OUTPATIENT)
Dept: ULTRASOUND IMAGING | Age: 67
Discharge: HOME | End: 2022-06-10
Attending: INTERNAL MEDICINE
Payer: MEDICARE

## 2022-06-10 DIAGNOSIS — K80.71 CALCULUS OF GALLBLADDER AND BILE DUCT WITH OBSTRUCTION WITHOUT CHOLECYSTITIS: ICD-10-CM

## 2022-06-10 PROCEDURE — 76705 ECHO EXAM OF ABDOMEN: CPT | Performed by: INTERNAL MEDICINE

## 2022-06-17 RX ORDER — FAMOTIDINE 20 MG/1
20 TABLET, FILM COATED ORAL NIGHTLY
Qty: 30 TABLET | Refills: 2 | Status: SHIPPED | OUTPATIENT
Start: 2022-06-17 | End: 2022-10-18

## 2022-07-06 RX ORDER — ESTRADIOL AND NORETHINDRONE ACETATE .5; .1 MG/1; MG/1
1 TABLET ORAL DAILY
Qty: 84 TABLET | Refills: 1 | Status: SHIPPED | OUTPATIENT
Start: 2022-07-06

## 2022-07-15 DIAGNOSIS — M25.50 PAIN IN JOINT, MULTIPLE SITES: ICD-10-CM

## 2022-07-15 RX ORDER — LORAZEPAM 1 MG/1
1 TABLET ORAL 3 TIMES DAILY
Qty: 270 TABLET | Refills: 1 | Status: SHIPPED | OUTPATIENT
Start: 2022-07-15

## 2022-07-15 RX ORDER — FLUOCINONIDE TOPICAL SOLUTION USP, 0.05% 0.5 MG/ML
1 SOLUTION TOPICAL DAILY
Qty: 60 ML | Refills: 0 | Status: SHIPPED | OUTPATIENT
Start: 2022-07-15

## 2022-07-15 RX ORDER — CLOBETASOL PROPIONATE 0.5 MG/G
AEROSOL, FOAM TOPICAL
Qty: 50 G | Refills: 0 | Status: SHIPPED | OUTPATIENT
Start: 2022-07-15

## 2022-07-15 RX ORDER — FLUOCINONIDE TOPICAL SOLUTION USP, 0.05% 0.5 MG/ML
SOLUTION TOPICAL
Qty: 60 ML | Refills: 0 | Status: SHIPPED | OUTPATIENT
Start: 2022-07-15

## 2022-07-22 ENCOUNTER — NURSE TRIAGE (OUTPATIENT)
Dept: INTERNAL MEDICINE CLINIC | Facility: CLINIC | Age: 67
End: 2022-07-22

## 2022-08-02 NOTE — TELEPHONE ENCOUNTER
From: Rosaura Cabot  To: Negra Shannon MD  Sent: 10/23/2020 5:38 PM CDT  Subject: Non-Urgent Medical Question    Dr. Jessica Lee   I have R foot lapidus bunionectomy and cheilectomy at [facility], [facility] 6[facility]. 10/29/20, Dr. Nika De La Rosa.   I have to James Tien Constitutional: No fever or chills.   Eyes: No pain, blurry vision, or discharge.  ENMT: No hearing changes, pain, discharge or infections. No neck pain or stiffness.  Cardiac: No chest pain, SOB or edema. No chest pain with exertion.  Respiratory: No cough or respiratory distress. No hemoptysis. No history of asthma or RAD.  GI: No nausea, vomiting, diarrhea or abdominal pain.  : No dysuria, frequency or burning.  MS: + Right calf pain.  No muscle weakness, joint pain or back pain.  Neuro: No headache or weakness. No LOC.  Skin: No skin rash.   Endocrine: No history of thyroid disease or diabetes.  Except as documented in the HPI, all other systems are negative. TBA

## 2022-08-03 ENCOUNTER — OFFICE VISIT (OUTPATIENT)
Dept: INTERNAL MEDICINE CLINIC | Facility: CLINIC | Age: 67
End: 2022-08-03
Payer: MEDICARE

## 2022-08-03 VITALS
HEART RATE: 89 BPM | RESPIRATION RATE: 18 BRPM | HEIGHT: 64 IN | DIASTOLIC BLOOD PRESSURE: 67 MMHG | BODY MASS INDEX: 23.93 KG/M2 | WEIGHT: 140.19 LBS | SYSTOLIC BLOOD PRESSURE: 106 MMHG

## 2022-08-03 DIAGNOSIS — Z13.6 SCREENING FOR HEART DISEASE: ICD-10-CM

## 2022-08-03 DIAGNOSIS — R76.8 POSITIVE ANA (ANTINUCLEAR ANTIBODY): ICD-10-CM

## 2022-08-03 DIAGNOSIS — M35.00 SJOGREN'S SYNDROME, WITH UNSPECIFIED ORGAN INVOLVEMENT (HCC): ICD-10-CM

## 2022-08-03 DIAGNOSIS — Z13.220 SCREENING CHOLESTEROL LEVEL: ICD-10-CM

## 2022-08-03 DIAGNOSIS — E03.9 ACQUIRED HYPOTHYROIDISM: ICD-10-CM

## 2022-08-03 DIAGNOSIS — R19.7 DIARRHEA, UNSPECIFIED TYPE: Primary | ICD-10-CM

## 2022-08-03 PROCEDURE — 99214 OFFICE O/P EST MOD 30 MIN: CPT | Performed by: INTERNAL MEDICINE

## 2022-08-03 RX ORDER — CHOLESTYRAMINE 4 G/9G
4 POWDER, FOR SUSPENSION ORAL DAILY
Qty: 90 EACH | Refills: 0 | Status: SHIPPED | OUTPATIENT
Start: 2022-08-03 | End: 2022-09-02

## 2022-08-10 ENCOUNTER — TELEPHONE (OUTPATIENT)
Dept: GASTROENTEROLOGY | Facility: CLINIC | Age: 67
End: 2022-08-10

## 2022-08-10 ENCOUNTER — LAB ENCOUNTER (OUTPATIENT)
Dept: LAB | Age: 67
End: 2022-08-10
Attending: INTERNAL MEDICINE
Payer: MEDICARE

## 2022-08-10 DIAGNOSIS — M35.00 SJOGREN'S SYNDROME, WITH UNSPECIFIED ORGAN INVOLVEMENT (HCC): ICD-10-CM

## 2022-08-10 DIAGNOSIS — R19.7 DIARRHEA, UNSPECIFIED TYPE: ICD-10-CM

## 2022-08-10 DIAGNOSIS — R76.8 POSITIVE ANA (ANTINUCLEAR ANTIBODY): ICD-10-CM

## 2022-08-10 DIAGNOSIS — Z13.6 SCREENING FOR HEART DISEASE: ICD-10-CM

## 2022-08-10 DIAGNOSIS — Z13.220 SCREENING CHOLESTEROL LEVEL: ICD-10-CM

## 2022-08-10 DIAGNOSIS — E03.9 ACQUIRED HYPOTHYROIDISM: ICD-10-CM

## 2022-08-10 LAB
ALBUMIN SERPL-MCNC: 3.3 G/DL (ref 3.4–5)
ALBUMIN/GLOB SERPL: 1.1 {RATIO} (ref 1–2)
ALP LIVER SERPL-CCNC: 61 U/L
ALT SERPL-CCNC: 23 U/L
ANION GAP SERPL CALC-SCNC: 4 MMOL/L (ref 0–18)
AST SERPL-CCNC: 15 U/L (ref 15–37)
BILIRUB SERPL-MCNC: 0.4 MG/DL (ref 0.1–2)
BUN BLD-MCNC: 14 MG/DL (ref 7–18)
BUN/CREAT SERPL: 21.9 (ref 10–20)
CALCIUM BLD-MCNC: 8.5 MG/DL (ref 8.5–10.1)
CHLORIDE SERPL-SCNC: 108 MMOL/L (ref 98–112)
CHOLEST SERPL-MCNC: 178 MG/DL (ref ?–200)
CO2 SERPL-SCNC: 25 MMOL/L (ref 21–32)
CREAT BLD-MCNC: 0.64 MG/DL
CRP SERPL-MCNC: <0.29 MG/DL (ref ?–0.3)
DEPRECATED RDW RBC AUTO: 45.4 FL (ref 35.1–46.3)
ERYTHROCYTE [DISTWIDTH] IN BLOOD BY AUTOMATED COUNT: 12.8 % (ref 11–15)
ERYTHROCYTE [SEDIMENTATION RATE] IN BLOOD: 9 MM/HR
FASTING PATIENT LIPID ANSWER: YES
FASTING STATUS PATIENT QL REPORTED: YES
GFR SERPLBLD BASED ON 1.73 SQ M-ARVRAT: 98 ML/MIN/1.73M2 (ref 60–?)
GLOBULIN PLAS-MCNC: 3 G/DL (ref 2.8–4.4)
GLUCOSE BLD-MCNC: 85 MG/DL (ref 70–99)
HCT VFR BLD AUTO: 40.5 %
HDLC SERPL-MCNC: 76 MG/DL (ref 40–59)
HGB BLD-MCNC: 13.2 G/DL
LDLC SERPL CALC-MCNC: 91 MG/DL (ref ?–100)
MCH RBC QN AUTO: 31.4 PG (ref 26–34)
MCHC RBC AUTO-ENTMCNC: 32.6 G/DL (ref 31–37)
MCV RBC AUTO: 96.2 FL
NONHDLC SERPL-MCNC: 102 MG/DL (ref ?–130)
OSMOLALITY SERPL CALC.SUM OF ELEC: 284 MOSM/KG (ref 275–295)
PLATELET # BLD AUTO: 210 10(3)UL (ref 150–450)
POTASSIUM SERPL-SCNC: 4.1 MMOL/L (ref 3.5–5.1)
PROT SERPL-MCNC: 6.3 G/DL (ref 6.4–8.2)
RBC # BLD AUTO: 4.21 X10(6)UL
RHEUMATOID FACT SERPL-ACNC: <10 IU/ML (ref ?–15)
SODIUM SERPL-SCNC: 137 MMOL/L (ref 136–145)
TRIGL SERPL-MCNC: 58 MG/DL (ref 30–149)
TSI SER-ACNC: 6.25 MIU/ML (ref 0.36–3.74)
VLDLC SERPL CALC-MCNC: 9 MG/DL (ref 0–30)
WBC # BLD AUTO: 4.5 X10(3) UL (ref 4–11)

## 2022-08-10 PROCEDURE — 36415 COLL VENOUS BLD VENIPUNCTURE: CPT

## 2022-08-10 PROCEDURE — 86235 NUCLEAR ANTIGEN ANTIBODY: CPT

## 2022-08-10 PROCEDURE — 80061 LIPID PANEL: CPT

## 2022-08-10 PROCEDURE — 86140 C-REACTIVE PROTEIN: CPT

## 2022-08-10 PROCEDURE — 85027 COMPLETE CBC AUTOMATED: CPT

## 2022-08-10 PROCEDURE — 85652 RBC SED RATE AUTOMATED: CPT

## 2022-08-10 PROCEDURE — 86200 CCP ANTIBODY: CPT

## 2022-08-10 PROCEDURE — 80053 COMPREHEN METABOLIC PANEL: CPT

## 2022-08-10 PROCEDURE — 86038 ANTINUCLEAR ANTIBODIES: CPT

## 2022-08-10 PROCEDURE — 84443 ASSAY THYROID STIM HORMONE: CPT

## 2022-08-10 PROCEDURE — 86039 ANTINUCLEAR ANTIBODIES (ANA): CPT

## 2022-08-10 PROCEDURE — 86431 RHEUMATOID FACTOR QUANT: CPT

## 2022-08-10 NOTE — TELEPHONE ENCOUNTER
Lara Biswas left message for patient to call back to schedule EGD on 8/9/2022. Please see 4/5/2022 encounter for further documentation-this is a duplicate. Encounter closed.

## 2022-08-10 NOTE — TELEPHONE ENCOUNTER
Pt calling back states has a VM from Dr. Kathryn Madera office states she thinks the name was Dia Almanzar

## 2022-08-11 ENCOUNTER — PATIENT MESSAGE (OUTPATIENT)
Dept: INTERNAL MEDICINE CLINIC | Facility: CLINIC | Age: 67
End: 2022-08-11

## 2022-08-11 LAB — NUCLEAR IGG TITR SER IF: POSITIVE {TITER}

## 2022-08-12 ENCOUNTER — OFFICE VISIT (OUTPATIENT)
Dept: INTERNAL MEDICINE CLINIC | Facility: CLINIC | Age: 67
End: 2022-08-12
Payer: MEDICARE

## 2022-08-12 VITALS
WEIGHT: 141 LBS | HEIGHT: 64 IN | BODY MASS INDEX: 24.07 KG/M2 | HEART RATE: 85 BPM | DIASTOLIC BLOOD PRESSURE: 85 MMHG | SYSTOLIC BLOOD PRESSURE: 130 MMHG

## 2022-08-12 DIAGNOSIS — Z12.31 SCREENING MAMMOGRAM FOR BREAST CANCER: ICD-10-CM

## 2022-08-12 DIAGNOSIS — E03.9 ACQUIRED HYPOTHYROIDISM: ICD-10-CM

## 2022-08-12 DIAGNOSIS — N95.0 POSTMENOPAUSAL BLEEDING: ICD-10-CM

## 2022-08-12 DIAGNOSIS — N64.4 BREAST PAIN: Primary | ICD-10-CM

## 2022-08-12 LAB — CCP IGG SERPL-ACNC: 0.6 U/ML (ref 0–6.9)

## 2022-08-12 PROCEDURE — 99214 OFFICE O/P EST MOD 30 MIN: CPT | Performed by: INTERNAL MEDICINE

## 2022-08-12 RX ORDER — LEVOTHYROXINE SODIUM 0.15 MG/1
125 TABLET ORAL
Qty: 90 TABLET | Refills: 1 | Status: SHIPPED | OUTPATIENT
Start: 2022-08-12

## 2022-08-12 NOTE — TELEPHONE ENCOUNTER
From: Richardson Peterson  To: Mo Murrya MD  Sent: 8/11/2022 6:34 PM CDT  Subject: L breast pain    Hi,   I have a problem with the L breast.   2 days ago I noted a red zig-zag streak lateral to the nipple. Today the breast is painful and lateral to the L nipple is a spider shaped redness. No dimpling, nipple discharge. I have a history of fibrocystic breasts. 3 yrs since last mammogram and U/S( I always get both). Several months ago I say you and derm for a strange reaction on the L face, neck, shoulder and back that was swollen, red, hot and painful. This was treated with prednisone. Neck scan was negative. Cellulitis? IBC? What diagnostic is best after exam? Should we skip past M/U/S to something else? Just want to get on the schedule. I will call in the AM for an appt.  and exam.   Reg Falcon

## 2022-08-13 LAB — ANA NUCLEOLAR TITR SER IF: 320 {TITER}

## 2022-08-17 ENCOUNTER — TELEPHONE (OUTPATIENT)
Dept: INTERNAL MEDICINE CLINIC | Facility: CLINIC | Age: 67
End: 2022-08-17

## 2022-08-17 DIAGNOSIS — N64.4 BREAST PAIN: Primary | ICD-10-CM

## 2022-08-17 LAB
ENA SS-A AB SER QL IA: NEGATIVE
ENA SS-B AB SER QL IA: NEGATIVE

## 2022-08-17 NOTE — TELEPHONE ENCOUNTER
Received call from patient stating that scheduling is requesting a diagnostic mammogram.    Please order and sign diagnostic mammogram if appropriate.

## 2022-08-20 ENCOUNTER — PATIENT MESSAGE (OUTPATIENT)
Dept: INTERNAL MEDICINE CLINIC | Facility: CLINIC | Age: 67
End: 2022-08-20

## 2022-08-21 NOTE — TELEPHONE ENCOUNTER
From: Santos Peterson  To: Leonor Aiken MD  Sent: 8/20/2022 11:29 PM CDT  Subject: Bite on neck    I noticed a round insect bite on my neck yesterday. One week ago I was working in [place] grass[place] pruning the bushes in an overgrown area by a pond. Should I consider it a tick bite? Is is standard to treat empirically for Lyme Disease?   Thank you  Ravi Vazquez MD

## 2022-08-22 ENCOUNTER — NURSE TRIAGE (OUTPATIENT)
Dept: INTERNAL MEDICINE CLINIC | Facility: CLINIC | Age: 67
End: 2022-08-22

## 2022-08-22 ENCOUNTER — OFFICE VISIT (OUTPATIENT)
Dept: INTERNAL MEDICINE CLINIC | Facility: CLINIC | Age: 67
End: 2022-08-22
Payer: MEDICARE

## 2022-08-22 VITALS
DIASTOLIC BLOOD PRESSURE: 72 MMHG | BODY MASS INDEX: 24.07 KG/M2 | HEART RATE: 92 BPM | HEIGHT: 64 IN | RESPIRATION RATE: 18 BRPM | WEIGHT: 141 LBS | SYSTOLIC BLOOD PRESSURE: 107 MMHG

## 2022-08-22 DIAGNOSIS — T14.8XXA MUSCLE STRAIN: ICD-10-CM

## 2022-08-22 DIAGNOSIS — K52.9 CHRONIC DIARRHEA: ICD-10-CM

## 2022-08-22 DIAGNOSIS — K44.9 HIATAL HERNIA: ICD-10-CM

## 2022-08-22 DIAGNOSIS — N93.9 VAGINAL BLEEDING: ICD-10-CM

## 2022-08-22 DIAGNOSIS — R21 RASH: Primary | ICD-10-CM

## 2022-08-22 DIAGNOSIS — Z12.11 COLON CANCER SCREENING: ICD-10-CM

## 2022-08-22 DIAGNOSIS — K21.9 GASTROESOPHAGEAL REFLUX DISEASE, UNSPECIFIED WHETHER ESOPHAGITIS PRESENT: ICD-10-CM

## 2022-08-22 PROCEDURE — 99214 OFFICE O/P EST MOD 30 MIN: CPT | Performed by: INTERNAL MEDICINE

## 2022-08-22 RX ORDER — CYCLOBENZAPRINE HCL 10 MG
10 TABLET ORAL NIGHTLY PRN
Qty: 20 TABLET | Refills: 1 | Status: SHIPPED | OUTPATIENT
Start: 2022-08-22

## 2022-08-22 RX ORDER — DOXYCYCLINE 100 MG/1
100 TABLET ORAL 2 TIMES DAILY
Qty: 14 TABLET | Refills: 0 | Status: SHIPPED | OUTPATIENT
Start: 2022-08-22

## 2022-08-23 ENCOUNTER — TELEPHONE (OUTPATIENT)
Dept: INTERNAL MEDICINE CLINIC | Facility: CLINIC | Age: 67
End: 2022-08-23

## 2022-08-23 DIAGNOSIS — T14.8XXA MUSCLE STRAIN: ICD-10-CM

## 2022-08-23 RX ORDER — CYCLOBENZAPRINE HCL 10 MG
10 TABLET ORAL NIGHTLY PRN
Qty: 20 TABLET | Refills: 1 | Status: SHIPPED | OUTPATIENT
Start: 2022-08-23

## 2022-08-23 NOTE — TELEPHONE ENCOUNTER
Pt states she tried to transfer doxycycline and cyclobenzaprine sent to pharmacy 8/22/22 by Dr Cristino Galicia.  Per pt [facility] was able to transfer the antibiotic \"but they told me they can't see the cyclobenzaprine. Per Med record Rx did not go through to pharmacy. Rx resent to the requested [facility] in [facility], but Rx will need a PA. Spoke with pharmacist at [facility] who states they have a coupon they can enter and cost of Rx will be 7.48. Detailed message left for pt that Rx is available for 7.48 at [facility]. Left detailed message for pt and my chart also sent.

## 2022-08-28 ENCOUNTER — PATIENT MESSAGE (OUTPATIENT)
Dept: INTERNAL MEDICINE CLINIC | Facility: CLINIC | Age: 67
End: 2022-08-28

## 2022-08-28 RX ORDER — LEVOCETIRIZINE DIHYDROCHLORIDE 5 MG/1
5 TABLET, FILM COATED ORAL EVERY EVENING
Qty: 90 TABLET | Refills: 1 | Status: SHIPPED | OUTPATIENT
Start: 2022-08-28

## 2022-08-28 RX ORDER — BETAMETHASONE DIPROPIONATE 0.5 MG/G
2 LOTION TOPICAL DAILY
Qty: 180 ML | Refills: 0 | Status: SHIPPED | OUTPATIENT
Start: 2022-08-28

## 2022-08-28 NOTE — TELEPHONE ENCOUNTER
From: Saulo Peterson  To: Buck Mahmood MD  Sent: 8/28/2022 10:34 AM CDT  Subject: prescriptions    Good Morning,    Would your office take over RX previously prescribed by Dr. Omar Simmons (allergy-asthma). Since my evaluation and work up with him, there are no changes and the levoceterizine is working for seasonal allergy symptoms. Also, betamethasone diproprionate is on of the meds used to manage scalp psoariasis ( it does not make my hair greasy like fluocinonide-which I will use when I know I will be at home where no one can see my hair. 1)Levoecetirizine 5 mg tabs # 90 Take one by mouth at night. 2) Betamethasone DIP 0.05 lotion 60ml Apply to affected area of scalp. Medicare allows 3 bottles/90 day supply.   Thank you, Gabi Sherman MD

## 2022-09-06 ENCOUNTER — TELEPHONE (OUTPATIENT)
Dept: GASTROENTEROLOGY | Facility: CLINIC | Age: 67
End: 2022-09-06

## 2022-09-06 DIAGNOSIS — R19.7 DIARRHEA, UNSPECIFIED TYPE: Primary | ICD-10-CM

## 2022-09-06 NOTE — TELEPHONE ENCOUNTER
Patient states she was advised by Dr. Suzy Mcnally to speak with Dr. Christine Rader regarding Cholestyramine dosing. States taking 2 packs daily but continues to have 4 loose stools daily. Denies abdominal pain or blood in stool. Patient scheduled first available appointment for 12/15/2022 but can't wait that long. Please advise. Thank you.

## 2022-09-06 NOTE — TELEPHONE ENCOUNTER
Patient contacted and discussed her symptoms, she is been having irregular bowel habits with diarrhea/looser stools which he describes as sticky for the last 2 years. Patient does drink a lot of milk on a daily basis. She has been started on cholestyramine by her primary physician. She feels that this may be helping but was asking about dosing. She has tried Imodium in the past but this tends to cause constipation. Discussed further work-up of her looser stools/diarrhea. Would like to get stool cultures and C. difficile sent off. Orders for stool testing was sent including C. difficile and fecal calprotectin. Advised that she stop drinking milk for a week and see what happens to her symptoms. We discussed colonoscopy-I would advise that since there could be a component of microscopic colitis however she would like to avoid colonoscopy at this point. Gallstones as per surgery.

## 2022-09-06 NOTE — TELEPHONE ENCOUNTER
Patient states she is having issues with managing Cholestyramine that was prescribed by Dr Jordy Mas, who advised her to speak with Dr Kathryn Madera. Please call. Thank you.

## 2022-09-09 ENCOUNTER — TELEPHONE (OUTPATIENT)
Dept: GASTROENTEROLOGY | Facility: CLINIC | Age: 67
End: 2022-09-09

## 2022-09-09 ENCOUNTER — TELEPHONE (OUTPATIENT)
Dept: INTERNAL MEDICINE CLINIC | Facility: CLINIC | Age: 67
End: 2022-09-09

## 2022-09-09 RX ORDER — MONTELUKAST SODIUM 4 MG/1
1 TABLET, CHEWABLE ORAL 2 TIMES DAILY
Qty: 60 TABLET | Refills: 0 | Status: SHIPPED | OUTPATIENT
Start: 2022-09-09 | End: 2022-10-09

## 2022-09-09 NOTE — TELEPHONE ENCOUNTER
See other encounter from 9/9/2022. She told me that she picked up the stool collection kits and plans on turning it in on the weekend.

## 2022-09-09 NOTE — TELEPHONE ENCOUNTER
Patient had questions about prep options. Also unsure who can take her home after the procedure. She has no family or friends who would drive her home afterwards. Wants to know if a medicar would be ok-I let her know that she could take a medicar but she would need a responsible adult to accompany her in the medicar and the  does not count since they do not ensure her safety once she is home. Asking if a visiting nurse would sign for her and drive her home and stay with her for an hour or two. I let her know that I have never heard of such service because visiting nurses usually go to the home for an hour or two for a skilled need and she needs a responsible adult to be with her for the evening not just an hour or so, however since gastroenterology does not typically order home healthcare and am unfamiliar with options advised can discuss with PCP. I let her know that endoscopy will ensure she has an adult to take responsibility for her post procedure before they will even perform a procedure and if she does not have this arranged they won't proceed. I asked if she has any available friends, neighbors, or members of the community that would be willing to be her responsible adult. Reviewed another option would be no sedation because sedation is the reason why she needs a responsible adult to be with her when she is discharged from endoscopy. I let her know that if she has a responsible adult that does not drive that they could take a transportation service but she would need to be with the responsible party and the  of the taxi/uber does not count. She will explore her options and will call back if chooses to proceed with colonoscopy.

## 2022-09-09 NOTE — TELEPHONE ENCOUNTER
Patient called (identified name and ),   Patient asking if Dr Flaco Avilez could prescribe pill form of bile acid sequestrant instead of  cholestyramine? Colestipol? Please advise. The cholestyramine powder is provoking heartburn. Patient believes this is due to having to take extra fluid to get it down. This RN suggested trial of taking cholestyramine with applesauce. Patient spoke with GI Dr Christine Rader yesterday about symptoms. She plans to turn in stool studies this weekend.

## 2022-09-09 NOTE — TELEPHONE ENCOUNTER
Patient requesting to speak with RN regarding CLN - wants to kn ow what this entails. Please call. Thank you.

## 2022-09-10 ENCOUNTER — LAB ENCOUNTER (OUTPATIENT)
Dept: LAB | Facility: HOSPITAL | Age: 67
End: 2022-09-10
Attending: INTERNAL MEDICINE
Payer: MEDICARE

## 2022-09-10 DIAGNOSIS — R19.7 DIARRHEA, UNSPECIFIED TYPE: ICD-10-CM

## 2022-09-10 LAB — C DIFF TOX B STL QL: NEGATIVE

## 2022-09-10 PROCEDURE — 87493 C DIFF AMPLIFIED PROBE: CPT

## 2022-09-10 PROCEDURE — 83993 ASSAY FOR CALPROTECTIN FECAL: CPT

## 2022-09-12 LAB — CALPROTECTIN STL-MCNT: 42.3 ΜG/G (ref ?–50)

## 2022-09-21 ENCOUNTER — HOSPITAL ENCOUNTER (OUTPATIENT)
Dept: MAMMOGRAPHY | Facility: HOSPITAL | Age: 67
Discharge: HOME | End: 2022-09-21
Attending: INTERNAL MEDICINE

## 2022-09-21 DIAGNOSIS — N64.4 BREAST PAIN: ICD-10-CM

## 2022-09-21 PROCEDURE — 77062 BREAST TOMOSYNTHESIS BI: CPT | Performed by: INTERNAL MEDICINE

## 2022-09-21 PROCEDURE — 77066 DX MAMMO INCL CAD BI: CPT | Performed by: INTERNAL MEDICINE

## 2022-09-30 DIAGNOSIS — T14.8XXA MUSCLE STRAIN: ICD-10-CM

## 2022-09-30 NOTE — TELEPHONE ENCOUNTER
Spoke to Mac Pardo. Patient picked up refill of cyclobenzaprine on 9/13/22. There are no refills left for this medication. Left patient message to triage and need for more flexeril.

## 2022-10-01 RX ORDER — CYCLOBENZAPRINE HCL 10 MG
10 TABLET ORAL NIGHTLY PRN
Qty: 20 TABLET | Refills: 1 | Status: SHIPPED | OUTPATIENT
Start: 2022-10-01

## 2022-10-01 RX ORDER — FLUOCINONIDE TOPICAL SOLUTION USP, 0.05% 0.5 MG/ML
1 SOLUTION TOPICAL DAILY
Qty: 60 ML | Refills: 0 | Status: SHIPPED | OUTPATIENT
Start: 2022-10-01
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
## 2022-10-03 ENCOUNTER — MED REC SCAN ONLY (OUTPATIENT)
Dept: INTERNAL MEDICINE CLINIC | Facility: CLINIC | Age: 67
End: 2022-10-03

## 2022-10-03 ENCOUNTER — OFFICE VISIT (OUTPATIENT)
Dept: INTERNAL MEDICINE CLINIC | Facility: CLINIC | Age: 67
End: 2022-10-03
Payer: MEDICARE

## 2022-10-03 ENCOUNTER — TELEPHONE (OUTPATIENT)
Dept: INTERNAL MEDICINE CLINIC | Facility: CLINIC | Age: 67
End: 2022-10-03

## 2022-10-03 ENCOUNTER — LAB ENCOUNTER (OUTPATIENT)
Dept: LAB | Age: 67
End: 2022-10-03
Attending: INTERNAL MEDICINE
Payer: MEDICARE

## 2022-10-03 VITALS
BODY MASS INDEX: 23.73 KG/M2 | HEIGHT: 64 IN | DIASTOLIC BLOOD PRESSURE: 84 MMHG | HEART RATE: 90 BPM | WEIGHT: 139 LBS | SYSTOLIC BLOOD PRESSURE: 130 MMHG

## 2022-10-03 DIAGNOSIS — N93.9 VAGINAL BLEEDING: ICD-10-CM

## 2022-10-03 DIAGNOSIS — E03.9 ACQUIRED HYPOTHYROIDISM: ICD-10-CM

## 2022-10-03 DIAGNOSIS — K52.9 CHRONIC DIARRHEA: Primary | ICD-10-CM

## 2022-10-03 DIAGNOSIS — G89.29 CHRONIC RIGHT-SIDED LOW BACK PAIN WITH RIGHT-SIDED SCIATICA: ICD-10-CM

## 2022-10-03 DIAGNOSIS — M54.41 CHRONIC RIGHT-SIDED LOW BACK PAIN WITH RIGHT-SIDED SCIATICA: ICD-10-CM

## 2022-10-03 LAB
T3FREE SERPL-MCNC: 2.1 PG/ML (ref 2.4–4.2)
T4 FREE SERPL-MCNC: 1.5 NG/DL (ref 0.8–1.7)
TSI SER-ACNC: 0.33 MIU/ML (ref 0.36–3.74)

## 2022-10-03 PROCEDURE — 84439 ASSAY OF FREE THYROXINE: CPT

## 2022-10-03 PROCEDURE — 84443 ASSAY THYROID STIM HORMONE: CPT

## 2022-10-03 PROCEDURE — 99213 OFFICE O/P EST LOW 20 MIN: CPT | Performed by: INTERNAL MEDICINE

## 2022-10-03 PROCEDURE — 36415 COLL VENOUS BLD VENIPUNCTURE: CPT

## 2022-10-03 PROCEDURE — 84481 FREE ASSAY (FT-3): CPT
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
## 2022-10-03 NOTE — PROGRESS NOTES
Parking placard mailed to , copy mailed to patient's home address, per patient request. Copy sent to scanning for patient's chart.

## 2022-10-15 ENCOUNTER — PATIENT MESSAGE (OUTPATIENT)
Dept: INTERNAL MEDICINE CLINIC | Facility: CLINIC | Age: 67
End: 2022-10-15

## 2022-10-15 DIAGNOSIS — M25.50 PAIN IN JOINT, MULTIPLE SITES: ICD-10-CM

## 2022-10-15 RX ORDER — LORAZEPAM 1 MG/1
1 TABLET ORAL 3 TIMES DAILY
Qty: 270 TABLET | Refills: 1 | OUTPATIENT
Start: 2022-10-15

## 2022-10-15 NOTE — TELEPHONE ENCOUNTER
Dr. Ronald Villar please see pt message below can she be on both?         ----- Message -----  From: Yohan Ardon  Sent: 10/15/2022  11:32 AM CDT  To: Em Rn Triage  Subject: refills                                          Good afternoon,      The my chart list for meds does not have a place to check for refills for these meds :  1)omeprazole 20 mg. ; currently taking 1 q pm.           Question- is it best to take before dinner or at bedtime? 2) famotidine 20mg 1 qam  Medicare allows  3 month supply for most meds.     Thank you      Iris Feliciano

## 2022-10-18 RX ORDER — FAMOTIDINE 20 MG/1
20 TABLET, FILM COATED ORAL NIGHTLY
Qty: 30 TABLET | Refills: 2 | Status: SHIPPED | OUTPATIENT
Start: 2022-10-18

## 2022-10-18 RX ORDER — FAMOTIDINE 20 MG/1
TABLET, FILM COATED ORAL
Qty: 30 TABLET | Refills: 2 | Status: SHIPPED | OUTPATIENT
Start: 2022-10-18

## 2022-10-28 ENCOUNTER — TELEPHONE (OUTPATIENT)
Dept: OBGYN CLINIC | Facility: CLINIC | Age: 67
End: 2022-10-28

## 2022-10-28 NOTE — TELEPHONE ENCOUNTER
Received via fax from [facility] OB/GYNE office records for pt previous care with them. Placed in [address] [place], [place] [address]d office in [facility]  for review at upcoming appt on 11/7/2022.

## 2022-12-19 RX ORDER — FLUOCINONIDE TOPICAL SOLUTION USP, 0.05% 0.5 MG/ML
1 SOLUTION TOPICAL DAILY
Qty: 60 ML | Refills: 0 | Status: SHIPPED | OUTPATIENT
Start: 2022-12-19

## 2022-12-27 RX ORDER — ESTRADIOL AND NORETHINDRONE ACETATE .5; .1 MG/1; MG/1
1 TABLET ORAL DAILY
Qty: 84 TABLET | Refills: 1 | Status: SHIPPED | OUTPATIENT
Start: 2022-12-27

## 2023-01-24 DIAGNOSIS — E03.9 ACQUIRED HYPOTHYROIDISM: ICD-10-CM

## 2023-01-25 RX ORDER — LEVOTHYROXINE SODIUM 0.15 MG/1
150 TABLET ORAL
Qty: 90 TABLET | Refills: 1 | Status: SHIPPED | OUTPATIENT
Start: 2023-01-25

## 2023-01-25 NOTE — TELEPHONE ENCOUNTER
Please review; protocol failed/no protocol.      Requested Prescriptions   Pending Prescriptions Disp Refills    LEVOTHYROXINE 150 MCG Oral Tab [Pharmacy Med Name: LEVOTHYROXINE 0.150MG (150MCG) TAB] 90 tablet 1     Sig: TAKE 1 TABLET(150 MCG) BY MOUTH BEFORE BREAKFAST       Thyroid Medication Protocol Failed - 1/24/2023 10:41 AM        Failed - Last TSH value is normal     Lab Results   Component Value Date    TSH 0.326 (L) 10/03/2022    THYROIDFUNC 9.41 (H) 09/23/2014                 Passed - TSH in past 12 months        Passed - In person appointment or virtual visit in the past 12 mos or appointment in next 3 mos     Recent Outpatient Visits              3 months ago Chronic diarrhea    Dallasy Rossana Sauceda MD    Office Visit    5 months ago [facility] Cab[facility]    Office Visit    5 months ago Breast pain    ToddfferRossana Meredith MD    Office Visit    5 months ago Diarrhea, unspecified type    Geoffery Andi Sauceda MD    Office Visit    8 months ago Insomnia, unspecified type    ToddfferSimin Meredith MD    Telemedicine          Future Appointments         Provider Department Appt Notes    In 2 weeks MD Froylan Cesarargatayari 44 annual                     Recent Outpatient Visits              3 months ago Chronic diarrhea    Rossana Moreno MD    Office Visit    5 months ago Rash    Simin Moreno MD    Office Visit    5 months ago Breast pain    Karla Baitsta MD    Office Visit    5 months ago Diarrhea, unspecified type Jackie Pleitez MD    Office Visit    8 months ago Insomnia, unspecified type    Jackie Pleitez MD    Telemedicine             Future Appointments         Provider Department Appt Notes    In 2 weeks Rosalene Kehr, MD Eligha Bigger, [address] [facility],[address], [address] [facility]

## 2023-02-04 ENCOUNTER — TELEPHONE (OUTPATIENT)
Dept: INTERNAL MEDICINE CLINIC | Facility: CLINIC | Age: 68
End: 2023-02-04

## 2023-02-04 NOTE — TELEPHONE ENCOUNTER
[facility] with the following:     L arm tingling, weakness, pain in elbow.  Jaw +diaphragmatic claudication, blurry vision, L back pain

## 2023-02-04 NOTE — TELEPHONE ENCOUNTER
Patient stated that has left arm pain-specifically the left elbow for a few months now. Patient is left handed. Has been doing physical therapy and even had a steroid injection in the elbow. Lately when doing physical therapy has noticed that her left arm has weakness and shakes with exercises. Can not fully extend the elbow, which they have been working on in physical therapy. Has seen the orthopedic and scheduled to see a physiatrist. This week patient had 2 strange episodes. Stated that went to the dentist earlier in the week and while she was getting her filling done(right upper tooth) noticed that she was getting diaphragm spasms. Never had that happen before and has not happened since. Did not last long. Few years ago had biliary colic, so not sure if related. On 2/2/2023 in the morning had a migraine which when she does get migraines they are on the left side but this was on the right side. Later on in the day on 2/2/2023 her whole jaw was chattering only lasted a minute but has not happened since. No jaw pain. Patient does have a history of lupus and sjogren's. No other symptoms. Patient is a retired physician. Patient has an appointment with Dr Cristy Patterson on 2/6/2023. Wanted to get doctor's recommendations and see if needed some labs/autoimmune labs done? Please advise.

## 2023-02-04 NOTE — TELEPHONE ENCOUNTER
First Call attempt. Left Voicemail to call back our office. Office phone number provided with office hours. Sent [employer] message.          Future Appointments   Date Time Provider Shaun Castle   2/6/2023  3:40 PM aTwny Bates MD [facility] [place]   2/14/2023 10:20 AM Montez Almaraz MD [facility]

## 2023-02-04 NOTE — TELEPHONE ENCOUNTER
Advised patient of Dr. Alejandrina Dinero note. Patient verbalized understanding and will keep appointment. Kellen Stoddard MD  You 1 hour ago (11:36 AM)     No labs needed. Patient will be assessed during next appointment.       Future Appointments   Date Time Provider Shaun Castle   2/6/2023  3:40 PM Kellen Stoddard MD [facility]   2/14/2023 10:20 AM Akil Tapia MD [facility]

## 2023-02-14 ENCOUNTER — OFFICE VISIT (OUTPATIENT)
Dept: OBGYN CLINIC | Facility: CLINIC | Age: 68
End: 2023-02-14

## 2023-02-14 VITALS
HEART RATE: 83 BPM | SYSTOLIC BLOOD PRESSURE: 121 MMHG | WEIGHT: 141 LBS | DIASTOLIC BLOOD PRESSURE: 75 MMHG | BODY MASS INDEX: 24 KG/M2

## 2023-02-14 DIAGNOSIS — Z01.411 ENCOUNTER FOR GYNECOLOGICAL EXAMINATION WITH ABNORMAL FINDING: ICD-10-CM

## 2023-02-14 DIAGNOSIS — N89.5 VAGINAL STRICTURE: ICD-10-CM

## 2023-02-14 DIAGNOSIS — Z78.0 MENOPAUSE: Primary | ICD-10-CM

## 2023-02-14 DIAGNOSIS — Z12.4 SCREENING FOR MALIGNANT NEOPLASM OF CERVIX: ICD-10-CM

## 2023-02-14 PROCEDURE — 99204 OFFICE O/P NEW MOD 45 MIN: CPT | Performed by: OBSTETRICS & GYNECOLOGY

## 2023-02-15 LAB — HPV I/H RISK 1 DNA SPEC QL NAA+PROBE: NEGATIVE

## 2023-02-17 ENCOUNTER — TELEPHONE (OUTPATIENT)
Dept: OBGYN CLINIC | Facility: CLINIC | Age: 68
End: 2023-02-17

## 2023-02-17 NOTE — TELEPHONE ENCOUNTER
Reviewed medical record from [facility] -- office embx attempt w/ only endocervical tissue. Ultrasound w/ thickened lining & polyp. In 2019  LM if pt recalls getting polyp removed under anesth  Awaiting pelvic ultrasound that I just ordered.

## 2023-02-20 DIAGNOSIS — M25.50 PAIN IN JOINT, MULTIPLE SITES: ICD-10-CM

## 2023-02-21 RX ORDER — LORAZEPAM 1 MG/1
TABLET ORAL
Qty: 270 TABLET | Refills: 0 | OUTPATIENT
Start: 2023-02-21

## 2023-02-22 NOTE — TELEPHONE ENCOUNTER
Records from [facility] reviewed and signed by Sindi Kulkarni Faxed to scan STAT and originals sent to scanning.

## 2023-02-23 RX ORDER — LORAZEPAM 1 MG/1
1 TABLET ORAL 3 TIMES DAILY
Qty: 270 TABLET | Refills: 1 | Status: SHIPPED | OUTPATIENT
Start: 2023-02-23

## 2023-02-28 ENCOUNTER — PATIENT MESSAGE (OUTPATIENT)
Dept: INTERNAL MEDICINE CLINIC | Facility: CLINIC | Age: 68
End: 2023-02-28

## 2023-03-01 NOTE — TELEPHONE ENCOUNTER
From: Ivette Peterson  To: Cyndee Akhtar MD  Sent: 2/28/2023 5:45 PM CST  Subject: General Information    Good Evening,   I have requested refills for Sumatriptan Nasal spray. It was previously ordered by Dr Colette Finch.   Please note result of PAP test. Vaginal ultrasound is scheduled for 3/17/23. I am asymptomatic but followed up with OB/GYN as it has been several years since my last exam and U/S with Dr. Ashkan Nguyen. Thank you.  [facility]

## 2023-03-02 ENCOUNTER — TELEPHONE (OUTPATIENT)
Facility: CLINIC | Age: 68
End: 2023-03-02

## 2023-03-02 RX ORDER — SUMATRIPTAN 5 MG/1
SPRAY NASAL
Qty: 3 EACH | Refills: 3 | Status: SHIPPED | OUTPATIENT
Start: 2023-03-02

## 2023-03-02 NOTE — TELEPHONE ENCOUNTER
1st reminder letter sent out via mail and [place] for the following:   [employer] OP CARD(S) (Order #[id number]) on 9/6/22

## 2023-03-02 NOTE — TELEPHONE ENCOUNTER
Please review. Protocol passed but last refilled on 2021. Requested Prescriptions   Pending Prescriptions Disp Refills    SUMAtriptan 5 MG/ACT Nasal Solution 3 each 1     Si sprays by Nasal route every 2 (two) hours as needed for Migraine.        Neurology Medications Passed - 3/1/2023 11:33 AM        Passed - In person appointment or virtual visit in the past 6 mos or appointment in next 3 mos     Recent Outpatient Visits              2 weeks ago [facility], [address] [facility] [address], [address] [facility] [address] Paulina Negrete MD    Office Visit    4 months ago Chronic diarrhea    1923 Krzysztof Loza MD    Office Visit    6 months ago Rash    1923 Oz Loza MD    Office Visit    6 months ago Breast pain    1923 Krzysztof Loza MD    Office Visit    7 months ago Diarrhea, unspecified type    1923 Oz Loza MD    Office Visit          Future Appointments         Provider Department Appt Notes    In 2 weeks [address] [facility]                     Recent Outpatient Visits              2 weeks ago [facility], [address], [address], Otto Carnes MD    Office Visit    4 months ago Chronic diarrhea     Krzysztof Loza MD    Office Visit    6 months ago Rash    1923 Oz Loza MD    Office Visit    6 months ago Breast pain    Jazz Matthews MD    Office Visit    7 months ago Diarrhea, unspecified type    1923 Oz Loza MD    Office Visit            Future Appointments Provider Department Appt Notes    In 2 weeks [address] [facility]

## 2023-03-07 RX ORDER — MOMETASONE FUROATE 50 UG/1
1 SPRAY, METERED NASAL DAILY
Qty: 51 G | Refills: 2 | Status: SHIPPED | OUTPATIENT
Start: 2023-03-07

## 2023-03-07 NOTE — TELEPHONE ENCOUNTER
Please review. Protocol failed / No protocol. Requested Prescriptions   Pending Prescriptions Disp Refills    betamethasone dipropionate 0.05 % External Lotion 180 mL 0     Sig: Apply 2 mL topically daily. To scalp       There is no refill protocol information for this order      Signed Prescriptions Disp Refills    mometasone furoate 50 MCG/ACT Nasal Suspension 51 g 2     Si spray by Nasal route daily.        Allergy Medication Protocol Passed - 3/7/2023 11:16 AM        Passed - In person appointment or virtual visit in the past 12 mos or appointment in next 3 mos     Recent Outpatient Visits              3 weeks ago Matilde Rincon, [address] [place] [address] Rosalene Kehr, MD    Office Visit    5 months ago [facility]    [facility]    6 months ago Jackie Pantoja MD    Office Visit    6 months ago Breast pain     [facility]    Office Visit    7 months ago Diarrhea, unspecified type     [facility]    [facility]          Future Appointments         Provider Department Appt Notes    In 1 week [address] [facility]                     Future Appointments         Provider Department Appt Notes    In 1 week [address] [facility]             Recent Outpatient Visits              3 weeks ago Reina Moore MD    Office Visit    5 months ago Chronic diarrhea     [facility]    Office Visit    6 months ago Jackie Pantoja MD Office Visit    6 months ago Breast pain    [address] [facility], Duke Melendez MD    Office Visit    7 months ago Diarrhea, unspecified type    [address] [facility], Mari Urbano MD    Office Visit nurse

## 2023-03-07 NOTE — TELEPHONE ENCOUNTER
Refill passed per [place] [employer] protocol. Requested Prescriptions   Pending Prescriptions Disp Refills    mometasone furoate 50 MCG/ACT Nasal Suspension 51 g 2     Si spray by Nasal route daily. Allergy Medication Protocol Passed - 3/7/2023 11:16 AM        Passed - In person appointment or virtual visit in the past 12 mos or appointment in next 3 mos     Recent Outpatient Visits              [facility], [address], [address] [facility] [address] Gregg Shaver MD    Office Visit    5 months ago Chronic diarrhea    Benjie Machado MD    Office Visit    6 months ago Hamzah Flatness, [place] Janet Long MD    Office Visit    6 months ago Breast pain    Benjie Machado MD    Office Visit    7 months ago Diarrhea, unspecified type    Elaine Machado MD    Office Visit          Future Appointments         Provider Department Appt Notes    In 1 week [address] [facility]                  betamethasone dipropionate 0.05 % External Lotion 180 mL 0     Sig: Apply 2 mL topically daily.  To scalp       There is no refill protocol information for this order          Recent Outpatient Visits              3 weeks ago Menopause    [address] [place],[address], [address] [facility] [address], [address], Shira Burns MD    Office Visit    5 months ago Chronic diarrhea    Benjie Machado MD    Office Visit    6 months ago Rash    Elaine Machado MD    Office Visit    6 months ago Breast pain    Ariadna Fatima MD    Office Visit    7 months ago Diarrhea, unspecified type Justin Cain MD    Office Visit            Future Appointments         Provider Department Appt Notes    In 1 week [address] [facility]

## 2023-03-08 NOTE — TELEPHONE ENCOUNTER
Please review; protocol failed. Externally reported medication. Requested Prescriptions   Pending Prescriptions Disp Refills    cholecalciferol 50 MCG (2000 UT) Oral Cap  0     Sig: Take 1 capsule (2,000 Units total) by mouth daily.  Take 2 tablets daily       There is no refill protocol information for this order          Future Appointments         Provider Department Appt Notes    In 1 week [address] [facility]             Recent Outpatient Visits              3 weeks ago Menopause    Alisha Burns, [address] [facility] [address] [facility] [address], [address] [facility] [address] Jimena Rawls MD    Office Visit    5 months ago Chronic diarrhea    Mike Walker MD    Office Visit    6 months ago Rash    Micky Walker MD    Office Visit    6 months ago Breast pain    Mike Walker MD    Office Visit    7 months ago Diarrhea, unspecified type    Micky Walker MD    Office Visit

## 2023-03-09 RX ORDER — BETAMETHASONE DIPROPIONATE 0.5 MG/G
2 LOTION TOPICAL DAILY
Qty: 180 ML | Refills: 0 | Status: SHIPPED | OUTPATIENT
Start: 2023-03-09

## 2023-03-09 RX ORDER — ACETAMINOPHEN 160 MG
2000 TABLET,DISINTEGRATING ORAL DAILY
Qty: 90 CAPSULE | Refills: 0 | Status: SHIPPED | OUTPATIENT
Start: 2023-03-09

## 2023-03-14 ENCOUNTER — MED REC SCAN ONLY (OUTPATIENT)
Dept: INTERNAL MEDICINE CLINIC | Facility: CLINIC | Age: 68
End: 2023-03-14

## 2023-03-14 ENCOUNTER — TELEPHONE (OUTPATIENT)
Dept: INTERNAL MEDICINE CLINIC | Facility: CLINIC | Age: 68
End: 2023-03-14

## 2023-03-14 NOTE — TELEPHONE ENCOUNTER
Spoke, with the patient and informed her of the message below. Pt stated that she will call back to schedule an appointment.

## 2023-03-17 ENCOUNTER — HOSPITAL ENCOUNTER (OUTPATIENT)
Dept: ULTRASOUND IMAGING | Facility: HOSPITAL | Age: 68
Discharge: HOME | End: 2023-03-17
Attending: OBSTETRICS & GYNECOLOGY
Payer: MEDICARE

## 2023-03-17 DIAGNOSIS — Z78.0 MENOPAUSE: ICD-10-CM

## 2023-03-17 PROCEDURE — 76830 TRANSVAGINAL US NON-OB: CPT | Performed by: OBSTETRICS & GYNECOLOGY

## 2023-03-17 PROCEDURE — 76856 US EXAM PELVIC COMPLETE: CPT | Performed by: OBSTETRICS & GYNECOLOGY

## 2023-03-22 ENCOUNTER — TELEPHONE (OUTPATIENT)
Dept: OBGYN CLINIC | Facility: CLINIC | Age: 68
End: 2023-03-22

## 2023-03-22 NOTE — TELEPHONE ENCOUNTER
----- Message from Jie Joe MD sent at 3/20/2023  8:28 PM CDT -----  Ultrasound shows abn lining & possible polyps. Plan for myosure under anesth to get sample & remove polyps. Needs medical clearance first. If wishes to see me first to discuss results, can see pt first week in April.

## 2023-03-23 NOTE — TELEPHONE ENCOUNTER
Pt informed of results and recs for myosure. Pt states she will call her pcp for clearance. Pt did not need an appt but wanted to know if a complete D&C will be done or if just the polyps will be removed. Message to [address] [facility].

## 2023-04-03 NOTE — TELEPHONE ENCOUNTER
Pt informed of [facility]'s recs. Pt asked what changed [facility]'s mind. Pt informed LESLEE did not give any further info, just that surgery would be a complete D&C. Pt states she is recovering from ankle surgery and wants to wait until she can walk before scheduling surgery. Pt also states she has no one that can pick her up after surgery so she needs to do research on medical transport. Pt will call when she is ready to schedule surgery.

## 2023-04-06 ENCOUNTER — PATIENT MESSAGE (OUTPATIENT)
Dept: INTERNAL MEDICINE CLINIC | Facility: CLINIC | Age: 68
End: 2023-04-06

## 2023-04-06 NOTE — TELEPHONE ENCOUNTER
Patient was advised to call the office to schedule an appointment on other MyChart. On 3/14/23 She was advised to schedule her Annual Medicare Physical.     No future appointments.

## 2023-04-12 ENCOUNTER — APPOINTMENT (OUTPATIENT)
Dept: OBGYN | Age: 68
End: 2023-04-12

## 2023-04-21 RX ORDER — SUMATRIPTAN 5 MG/1
SPRAY NASAL
Qty: 3 EACH | Refills: 3 | Status: SHIPPED | OUTPATIENT
Start: 2023-04-21

## 2023-04-21 NOTE — TELEPHONE ENCOUNTER
Please review. Protocol failed / No protocol. See pended Rx for review and approval.     Requested Prescriptions   Pending Prescriptions Disp Refills    SUMAtriptan 5 MG/ACT Nasal Solution 3 each 3     Si sprays by Nasal route every 2 (two) hours as needed for Migraine.        Neurology Medications Failed - 2023  9:09 AM        Failed - In person appointment or virtual visit in the past 6 mos or appointment in next 3 mos     Recent Outpatient Visits              2 months ago Moshe Solis, [address] [facility], [address], King Jorge MD    Office Visit    6 months ago Chronic diarrhea    Sonia Betancourt MD    Office Visit    8 months ago Reynaldo Phan MD    Office Visit    8 months ago Breast pain    Sonia Betancourt MD    Office Visit    8 months ago Diarrhea, unspecified type    Reynaldo Betancourt MD    Office Visit                           Recent Outpatient Visits              2 months ago Moshe Solis, [address] [facility],[address], Kole Sr MD    Office Visit    6 months ago Chronic diarrhea    Sonia Betancourt MD    Office Visit    8 months ago Simin Phan MD    Office Visit    8 months ago Breast pain    Christopher Schuster MD    Office Visit    8 months ago Diarrhea, unspecified type    Reynaldo Betancourt MD    Office Visit

## 2023-04-24 ENCOUNTER — PATIENT MESSAGE (OUTPATIENT)
Dept: INTERNAL MEDICINE CLINIC | Facility: CLINIC | Age: 68
End: 2023-04-24

## 2023-04-24 ENCOUNTER — TELEPHONE (OUTPATIENT)
Dept: INTERNAL MEDICINE CLINIC | Facility: CLINIC | Age: 68
End: 2023-04-24

## 2023-04-24 NOTE — TELEPHONE ENCOUNTER
Patient called, verified Name and . She states she picked up a prescription for sumatriptan and was only given 1 box. Prescription shows #3. Mariana called, pharmacist on break. Will call back to follow up. Spoke to [employer] [facility]) - Mariana, verified patient's Name and . Unable to run medication in the system to find out the reason why patient is only being dispensed 1 box every time since refill was already dispensed today. Confirmed with patient, she states refill will be delivered tonight. Also relayed that patient has 6 more refills on file in the pharmacy. Patient verbalized understanding and had no further questions at this time.

## 2023-04-24 NOTE — TELEPHONE ENCOUNTER
Patient called, verified Name and . She is requesting refill of sumatriptan succinate 6 mg/0.5 ml subcutaneous solution. She takes this in the past when her migraines were bad. She may repeat a dose after 2 hours or as needed for up to 30 days. She has had migraines for approximately 5 days in a row now. She gets more migraines during the spring and when flying due to rapid air pressure changes. She recalls discussing this with her PCP and wants to see if she can have this reordered and on hand, just in case she has problems refilling her sumatriptan nasal solution. She will be needing 29 gauge syringes as well to administer this. Patient is a retired physician. Medication pended to run through protocol.

## 2023-04-26 ENCOUNTER — OFFICE VISIT (OUTPATIENT)
Dept: OBGYN | Age: 68
End: 2023-04-26

## 2023-04-26 VITALS
WEIGHT: 139 LBS | DIASTOLIC BLOOD PRESSURE: 81 MMHG | SYSTOLIC BLOOD PRESSURE: 119 MMHG | BODY MASS INDEX: 21.82 KG/M2 | HEART RATE: 74 BPM | HEIGHT: 67 IN

## 2023-04-26 DIAGNOSIS — N84.0 ENDOMETRIAL POLYP: ICD-10-CM

## 2023-04-26 DIAGNOSIS — R93.89 THICKENED ENDOMETRIUM: Primary | ICD-10-CM

## 2023-04-26 PROCEDURE — 99204 OFFICE O/P NEW MOD 45 MIN: CPT | Performed by: OBSTETRICS & GYNECOLOGY

## 2023-04-26 RX ORDER — BETAMETHASONE DIPROPIONATE 0.5 MG/G
2 LOTION TOPICAL
COMMUNITY
Start: 2023-03-09

## 2023-04-26 RX ORDER — CLOBETASOL PROPIONATE 0.5 MG/G
1 AEROSOL, FOAM TOPICAL
COMMUNITY

## 2023-04-26 RX ORDER — SUMATRIPTAN 5 MG/1
SPRAY NASAL
COMMUNITY
Start: 2023-04-24

## 2023-04-26 RX ORDER — IBUPROFEN 200 MG
1250 CAPSULE ORAL
COMMUNITY
End: 2023-04-26

## 2023-04-26 RX ORDER — OMEPRAZOLE 20 MG/1
20 CAPSULE, DELAYED RELEASE ORAL DAILY
COMMUNITY

## 2023-04-26 RX ORDER — CETIRIZINE HYDROCHLORIDE 5 MG/1
5 TABLET, CHEWABLE ORAL
COMMUNITY

## 2023-04-26 RX ORDER — ONDANSETRON 4 MG/1
4 TABLET, ORALLY DISINTEGRATING ORAL EVERY 8 HOURS PRN
Qty: 10 TABLET | Refills: 1 | Status: CANCELLED | OUTPATIENT
Start: 2023-04-26

## 2023-04-26 RX ORDER — OMEPRAZOLE 20 MG/1
40 CAPSULE, DELAYED RELEASE ORAL NIGHTLY
COMMUNITY

## 2023-04-26 RX ORDER — FAMOTIDINE 20 MG/1
TABLET, FILM COATED ORAL 2 TIMES DAILY
COMMUNITY

## 2023-04-26 RX ORDER — LEVOTHYROXINE SODIUM 0.15 MG/1
150 TABLET ORAL DAILY
COMMUNITY

## 2023-04-26 RX ORDER — CLONIDINE HYDROCHLORIDE 0.1 MG/1
0.1 TABLET ORAL EVERY 6 HOURS
COMMUNITY

## 2023-04-26 RX ORDER — CYCLOBENZAPRINE HCL 10 MG
TABLET ORAL
COMMUNITY
Start: 2023-02-15

## 2023-04-26 RX ORDER — AZELASTINE 1 MG/ML
2 SPRAY, METERED NASAL
COMMUNITY
Start: 2022-12-18

## 2023-04-26 RX ORDER — SUMATRIPTAN 6 MG/.5ML
6 INJECTION, SOLUTION SUBCUTANEOUS ONCE
Qty: 0.5 ML | Refills: 0 | Status: CANCELLED | OUTPATIENT
Start: 2023-04-26 | End: 2023-04-26

## 2023-04-26 ASSESSMENT — PATIENT HEALTH QUESTIONNAIRE - PHQ9
CLINICAL INTERPRETATION OF PHQ2 SCORE: NO FURTHER SCREENING NEEDED
SUM OF ALL RESPONSES TO PHQ9 QUESTIONS 1 AND 2: 0
1. LITTLE INTEREST OR PLEASURE IN DOING THINGS: NOT AT ALL
SUM OF ALL RESPONSES TO PHQ9 QUESTIONS 1 AND 2: 0
2. FEELING DOWN, DEPRESSED OR HOPELESS: NOT AT ALL

## 2023-04-26 NOTE — TELEPHONE ENCOUNTER
See refill request encounter from 23.         ----- Message -----  From: Sisi York  Sent: 2023   5:29 PM CDT  To: Em Triage Support  Subject: Migraine medication                              Hi, thank you for the refill for sumatriptan nasal spray. This spring has been a bad one for migraines. I spoke to [facility] to discuss the vials of injectible sumatriptan succinate 6mg/0.5ml which I had but  in . You have never filled this. I have to turn to the injectible when the nasal spray is ineffective, which was the case in . I need it for back -up for intractible migraines. We also discussed RX for syringes. I forgot to request Zofran/ondansetron the rapid dissolving, because I get severe nausea. Other anti-nausea meds were ineffective. The ones I had at home  in . Please send in a RX for this as well. Thank you.

## 2023-04-27 RX ORDER — ONDANSETRON 4 MG/1
4 TABLET, ORALLY DISINTEGRATING ORAL EVERY 8 HOURS PRN
Qty: 20 TABLET | Refills: 0 | Status: SHIPPED | OUTPATIENT
Start: 2023-04-27

## 2023-04-27 NOTE — TELEPHONE ENCOUNTER
I called the patient and informed. She stated today the headaches are better. She only uses when needed. She stated she hopes they will go away. I informed we are in the office tomorrow if  Needed. She stated thank you.

## 2023-04-27 NOTE — TELEPHONE ENCOUNTER
Looks like she just refilled the sumatriptan April 22. If she is going through that much there is a concern it is only April 27. Too much can cause some cardiac arrhythmias but also rebound effect from the medicines. Maybe this needs to be readdressed with her PCP. I will put in a refill for the Zofran in the meantime. Do not see that she has been on this prior. I will do the ODT's. But check about insurance coverage.

## 2023-04-28 ENCOUNTER — TELEPHONE (OUTPATIENT)
Dept: INTERNAL MEDICINE CLINIC | Facility: CLINIC | Age: 68
End: 2023-04-28

## 2023-04-28 ENCOUNTER — TELEMEDICINE (OUTPATIENT)
Dept: INTERNAL MEDICINE CLINIC | Facility: CLINIC | Age: 68
End: 2023-04-28

## 2023-04-28 ENCOUNTER — PATIENT MESSAGE (OUTPATIENT)
Dept: INTERNAL MEDICINE CLINIC | Facility: CLINIC | Age: 68
End: 2023-04-28

## 2023-04-28 DIAGNOSIS — G43.001 MIGRAINE WITHOUT AURA AND WITH STATUS MIGRAINOSUS, NOT INTRACTABLE: Primary | ICD-10-CM

## 2023-04-28 PROCEDURE — 99213 OFFICE O/P EST LOW 20 MIN: CPT | Performed by: INTERNAL MEDICINE

## 2023-04-28 RX ORDER — SUMATRIPTAN 5 MG/1
SPRAY NASAL
Qty: 5 EACH | Refills: 3 | Status: SHIPPED | OUTPATIENT
Start: 2023-04-28

## 2023-04-28 NOTE — TELEPHONE ENCOUNTER
Patient calling about Sumatriptan and making sure Prior Auth is sent as as soon as possible as she's been \"waiting for 8 days\" and headaches are worse. States virtual appt with Dr. Dutch Suresh today in order to receive this medication. After reviewing chart, spoke with [facility] who stated patient will not be able to get the medication until Monday as they are out of stock of this medication. Asked if another [facility] nearby would have this and stated [facility] in [place] looks like they may. Pharm Basha called other [facility] to make sure they in fact have. [facility] in [place] does have a 15 day supply for $45.00 out of pocket. Informed patient of medication to be picked up. Patient relieved she does not have to go to ER. Will  medication at [facility] in [place] today.

## 2023-04-28 NOTE — TELEPHONE ENCOUNTER
Received a call from Dr Philipp Parker. Call pharmacy to confirm sumatriptan injectable is sent correctly. And will need syringes if needed. Patient seeking injectable. Calling Mariana now. Pharmacy confirmed RX for sumatriptan injectables is sent correctly and will need syringes. I sent an RX for syringes, and pharmacy will make sure the correct Rx is ordered for syringes. Pharmacist also states patient just picked up RX for sumatriptan nasal spray 4/24/23; therefore new RX for injectable will not be covered until May 28th: patient  would have to pay if patient wants it sooner.

## 2023-04-28 NOTE — TELEPHONE ENCOUNTER
Patient called states that she called and states that she called Pharmacy states that they could not refill as it is a refill too soon and they would need a prior authorization to fill. She was reminded of Dr. Polo Carranza notation related to Zofran. 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 She would schedule appointment with Dr. Noe Torres, but Dr. Noe Torres is not in the office --> she was offered a visit with another provider, she asked that it be virtual as she currently has a migraine and cannot drive. I scheduled as requested. Patient verbalized understanding. No further questions or concerns at this time.     Future Appointments   Date Time Provider Shaun Castle   4/28/2023 11:30 AM Mauricio Schulte MD [facility] [place]   5/30/2023 11:00 AM Jennifer Saenz MD [facility] Leanna Soliz

## 2023-04-28 NOTE — TELEPHONE ENCOUNTER
Unable to reach pt by phone--keeps disconnected. She has sent several [employer] messages and I responded on that encounter.

## 2023-04-29 NOTE — TELEPHONE ENCOUNTER
From: Nabila Peterson  Sent: 4/29/2023 8:33 AM CDT  To: Iwona Gillis MD  Subject: Migraine Rx    (continued) It takes 72 hours for the office to reply and then the pharmacy is often out of stock. I require meds on hand to be ready for the next inevitable round of migraines. In the past if after 2 days of migraines either not responding or recurring, I switch to the injectable, which frequently breaks the migraine. It has been approved in the past that I receive 3 boxes of the nasal spray and the injectable. Migraines may resolve and the recur a week later. In the past refills of 3 boxes in a short time frame was approved. Spring, fall and the month of July ( intense storms) are often bad times for me. I will add that I have endured every CT, MRI and other diagnostic test repeatedly over the years. I merely need the meds that work. Thank you for requesting pre-authorization.  It may be necessary to file and appeal.  Malu Rojas MD

## 2023-04-29 NOTE — TELEPHONE ENCOUNTER
Pt of Dr. Corina Brush get prior auth for both Imitrex nasal and Imitrex injections. Pt uses both. Please write letter with the reasons she has given.

## 2023-04-30 ENCOUNTER — TELEPHONE (OUTPATIENT)
Dept: INTERNAL MEDICINE CLINIC | Facility: CLINIC | Age: 68
End: 2023-04-30

## 2023-04-30 RX ORDER — SUMATRIPTAN 100 MG/1
100 TABLET, FILM COATED ORAL EVERY 2 HOUR PRN
Qty: 9 TABLET | Refills: 1 | Status: SHIPPED | OUTPATIENT
Start: 2023-04-30

## 2023-04-30 RX ORDER — SUMATRIPTAN 6 MG/.5ML
6 INJECTION, SOLUTION SUBCUTANEOUS DAILY PRN
Qty: 4 ML | Refills: 0 | Status: SHIPPED | OUTPATIENT
Start: 2023-04-30

## 2023-04-30 RX ORDER — SYRINGE, DISPOSABLE, 1 ML
SYRINGE, EMPTY DISPOSABLE MISCELLANEOUS
Qty: 25 EACH | Refills: 0 | Status: SHIPPED | OUTPATIENT
Start: 2023-04-30

## 2023-05-01 ENCOUNTER — TELEPHONE (OUTPATIENT)
Dept: INTERNAL MEDICINE CLINIC | Facility: CLINIC | Age: 68
End: 2023-05-01

## 2023-05-01 NOTE — TELEPHONE ENCOUNTER
From: Asher Peterson  To: Hanh Cruz MD  Sent: 4/28/2023 4:47 PM CDT  Subject: Migraine Rx    The [place] pharmacy states they could not fill the Sumatriptan auto-inject, because it is \"double treatment\". Please send in pre-authorization request for this form of injectible Imitrex, so that I might have it by early next week.   Thank Migue Villalba MD

## 2023-05-01 NOTE — TELEPHONE ENCOUNTER
Message # [phone]         2023 08:20a   [TAMICA]  To:  From:  SHANA Keene MD:  Phone#:  ----------------------------------------------------------------------  Fabian Holguin  1955 [phone] RE: MIGRAINE Paged at number :  PAGE: [phone] at :  08:20            (Message Delivered)   D E L I V E R I E S :  2023 08:20a           TAMICA Solis

## 2023-05-01 NOTE — TELEPHONE ENCOUNTER
There are several telephone and mychart encounters regarding this pt. Some messages were added on to previous messages. All calls were answered. All messages were regarding Imitrex refills--oral, injectable, and nasal.  Rxs were sent.

## 2023-05-03 ENCOUNTER — TELEPHONE (OUTPATIENT)
Facility: CLINIC | Age: 68
End: 2023-05-03

## 2023-05-03 NOTE — TELEPHONE ENCOUNTER
Fax received from [employer] that [employer] is . Patient didn't want to do the Cologuard and Dr. Izzy Alexander was aware (see 22 encounter). I sent this letter to be scanned into Epic.

## 2023-05-05 RX ORDER — LEVOCETIRIZINE DIHYDROCHLORIDE 5 MG/1
5 TABLET, FILM COATED ORAL EVERY EVENING
Qty: 90 TABLET | Refills: 3 | Status: SHIPPED | OUTPATIENT
Start: 2023-05-05

## 2023-05-05 NOTE — TELEPHONE ENCOUNTER
Refill passed per [place] [facility] protocol. Requested Prescriptions   Pending Prescriptions Disp Refills    levocetirizine 5 MG Oral Tab 90 tablet 1     Sig: Take 1 tablet (5 mg total) by mouth every evening.        Allergy Medication Protocol Passed - 5/5/2023  8:18 AM        Passed - In person appointment or virtual visit in the past 12 mos or appointment in next 3 mos     Recent Outpatient Visits              1 week ago Migraine without aura and with status migrainosus, not intractable    Patrice Shoemaker, Ryan Hall MD    Telemedicine    2 months ago Menopause    Patrice Shoemaker, [address], [address], Burton Gonzalez MD    Office Visit    7 months ago Chronic diarrhea    [address] [facility], Rodriguez Spencer MD    Office Visit    8 months ago Susan Tran MD    Office Visit    8 months ago Breast pain    [address] [facility], Rodriguez Spencer MD    Office Visit          Future Appointments         Provider Department Appt Notes    In 3 weeks MD Patrice Dill, [address] last pxl 4/1/22

## 2023-05-30 ENCOUNTER — LAB ENCOUNTER (OUTPATIENT)
Dept: LAB | Age: 68
End: 2023-05-30
Attending: INTERNAL MEDICINE
Payer: MEDICARE

## 2023-05-30 ENCOUNTER — OFFICE VISIT (OUTPATIENT)
Dept: INTERNAL MEDICINE CLINIC | Facility: CLINIC | Age: 68
End: 2023-05-30

## 2023-05-30 VITALS
SYSTOLIC BLOOD PRESSURE: 120 MMHG | DIASTOLIC BLOOD PRESSURE: 82 MMHG | WEIGHT: 137 LBS | HEART RATE: 84 BPM | OXYGEN SATURATION: 98 % | HEIGHT: 64 IN | BODY MASS INDEX: 23.39 KG/M2

## 2023-05-30 DIAGNOSIS — M54.41 CHRONIC RIGHT-SIDED LOW BACK PAIN WITH RIGHT-SIDED SCIATICA: ICD-10-CM

## 2023-05-30 DIAGNOSIS — M25.50 PAIN IN JOINT, MULTIPLE SITES: ICD-10-CM

## 2023-05-30 DIAGNOSIS — K44.9 HIATAL HERNIA: ICD-10-CM

## 2023-05-30 DIAGNOSIS — K52.9 CHRONIC DIARRHEA: ICD-10-CM

## 2023-05-30 DIAGNOSIS — G43.009 MIGRAINE WITHOUT AURA AND WITHOUT STATUS MIGRAINOSUS, NOT INTRACTABLE: ICD-10-CM

## 2023-05-30 DIAGNOSIS — E03.9 ACQUIRED HYPOTHYROIDISM: ICD-10-CM

## 2023-05-30 DIAGNOSIS — E55.9 VITAMIN D DEFICIENCY: ICD-10-CM

## 2023-05-30 DIAGNOSIS — F41.9 ANXIETY: ICD-10-CM

## 2023-05-30 DIAGNOSIS — Z13.6 SCREENING FOR CARDIOVASCULAR CONDITION: ICD-10-CM

## 2023-05-30 DIAGNOSIS — M54.12 CERVICAL NEURITIS: ICD-10-CM

## 2023-05-30 DIAGNOSIS — G89.29 CHRONIC RIGHT-SIDED LOW BACK PAIN WITH RIGHT-SIDED SCIATICA: ICD-10-CM

## 2023-05-30 DIAGNOSIS — M35.00 SJOGREN'S SYNDROME, WITH UNSPECIFIED ORGAN INVOLVEMENT (HCC): ICD-10-CM

## 2023-05-30 DIAGNOSIS — G50.0 TRIGEMINAL NEURALGIA: ICD-10-CM

## 2023-05-30 DIAGNOSIS — Z00.00 ENCOUNTER FOR ANNUAL HEALTH EXAMINATION: Primary | ICD-10-CM

## 2023-05-30 DIAGNOSIS — K21.00 GASTROESOPHAGEAL REFLUX DISEASE WITH ESOPHAGITIS WITHOUT HEMORRHAGE: ICD-10-CM

## 2023-05-30 DIAGNOSIS — M32.9 LUPUS (HCC): ICD-10-CM

## 2023-05-30 DIAGNOSIS — R53.83 FATIGUE, UNSPECIFIED TYPE: ICD-10-CM

## 2023-05-30 DIAGNOSIS — F13.99 SEDATIVE, HYPNOTIC OR ANXIOLYTIC USE, UNSPECIFIED WITH UNSPECIFIED SEDATIVE, HYPNOTIC OR ANXIOLYTIC-INDUCED DISORDER (HCC): ICD-10-CM

## 2023-05-30 DIAGNOSIS — Z00.00 ENCOUNTER FOR ANNUAL HEALTH EXAMINATION: ICD-10-CM

## 2023-05-30 PROBLEM — K21.9 GASTROESOPHAGEAL REFLUX DISEASE: Status: RESOLVED | Noted: 2022-08-22 | Resolved: 2023-05-30

## 2023-05-30 LAB
ALBUMIN SERPL-MCNC: 3.5 G/DL (ref 3.4–5)
ALBUMIN/GLOB SERPL: 1.2 {RATIO} (ref 1–2)
ALP LIVER SERPL-CCNC: 51 U/L
ALT SERPL-CCNC: 30 U/L
ANION GAP SERPL CALC-SCNC: 3 MMOL/L (ref 0–18)
AST SERPL-CCNC: 20 U/L (ref 15–37)
BILIRUB SERPL-MCNC: 0.3 MG/DL (ref 0.1–2)
BUN BLD-MCNC: 25 MG/DL (ref 7–18)
BUN/CREAT SERPL: 38.5 (ref 10–20)
CALCIUM BLD-MCNC: 9.1 MG/DL (ref 8.5–10.1)
CHLORIDE SERPL-SCNC: 108 MMOL/L (ref 98–112)
CHOLEST SERPL-MCNC: 193 MG/DL (ref ?–200)
CO2 SERPL-SCNC: 27 MMOL/L (ref 21–32)
CREAT BLD-MCNC: 0.65 MG/DL
FASTING PATIENT LIPID ANSWER: NO
FASTING STATUS PATIENT QL REPORTED: NO
GFR SERPLBLD BASED ON 1.73 SQ M-ARVRAT: 97 ML/MIN/1.73M2 (ref 60–?)
GLOBULIN PLAS-MCNC: 2.9 G/DL (ref 2.8–4.4)
GLUCOSE BLD-MCNC: 92 MG/DL (ref 70–99)
HDLC SERPL-MCNC: 91 MG/DL (ref 40–59)
LDLC SERPL CALC-MCNC: 88 MG/DL (ref ?–100)
NONHDLC SERPL-MCNC: 102 MG/DL (ref ?–130)
OSMOLALITY SERPL CALC.SUM OF ELEC: 290 MOSM/KG (ref 275–295)
POTASSIUM SERPL-SCNC: 4.1 MMOL/L (ref 3.5–5.1)
PROT SERPL-MCNC: 6.4 G/DL (ref 6.4–8.2)
SODIUM SERPL-SCNC: 138 MMOL/L (ref 136–145)
TRIGL SERPL-MCNC: 80 MG/DL (ref 30–149)
TSI SER-ACNC: 2.05 MIU/ML (ref 0.36–3.74)
VIT B12 SERPL-MCNC: 295 PG/ML (ref 193–986)
VIT D+METAB SERPL-MCNC: 61.9 NG/ML (ref 30–100)
VLDLC SERPL CALC-MCNC: 13 MG/DL (ref 0–30)

## 2023-05-30 PROCEDURE — 36415 COLL VENOUS BLD VENIPUNCTURE: CPT

## 2023-05-30 PROCEDURE — 82306 VITAMIN D 25 HYDROXY: CPT

## 2023-05-30 PROCEDURE — 80053 COMPREHEN METABOLIC PANEL: CPT

## 2023-05-30 PROCEDURE — 82607 VITAMIN B-12: CPT

## 2023-05-30 PROCEDURE — 84443 ASSAY THYROID STIM HORMONE: CPT

## 2023-05-30 PROCEDURE — G0439 PPPS, SUBSEQ VISIT: HCPCS | Performed by: INTERNAL MEDICINE

## 2023-05-30 PROCEDURE — 80061 LIPID PANEL: CPT

## 2023-05-30 PROCEDURE — 1125F AMNT PAIN NOTED PAIN PRSNT: CPT | Performed by: INTERNAL MEDICINE

## 2023-06-03 DIAGNOSIS — M25.50 PAIN IN JOINT, MULTIPLE SITES: ICD-10-CM

## 2023-06-05 RX ORDER — LORAZEPAM 1 MG/1
1 TABLET ORAL 3 TIMES DAILY
Qty: 90 TABLET | Refills: 0 | Status: SHIPPED | OUTPATIENT
Start: 2023-06-05

## 2023-06-06 ENCOUNTER — OFFICE VISIT (OUTPATIENT)
Dept: INTERNAL MEDICINE CLINIC | Facility: CLINIC | Age: 68
End: 2023-06-06

## 2023-06-06 ENCOUNTER — LAB ENCOUNTER (OUTPATIENT)
Dept: LAB | Age: 68
End: 2023-06-06
Attending: INTERNAL MEDICINE
Payer: MEDICARE

## 2023-06-06 VITALS
HEART RATE: 85 BPM | WEIGHT: 140 LBS | HEIGHT: 64 IN | DIASTOLIC BLOOD PRESSURE: 76 MMHG | BODY MASS INDEX: 23.9 KG/M2 | SYSTOLIC BLOOD PRESSURE: 112 MMHG | OXYGEN SATURATION: 98 %

## 2023-06-06 DIAGNOSIS — M79.604 BILATERAL LEG PAIN: Primary | ICD-10-CM

## 2023-06-06 DIAGNOSIS — M79.605 BILATERAL LEG PAIN: Primary | ICD-10-CM

## 2023-06-06 DIAGNOSIS — M79.605 BILATERAL LEG PAIN: ICD-10-CM

## 2023-06-06 DIAGNOSIS — M79.604 BILATERAL LEG PAIN: ICD-10-CM

## 2023-06-06 LAB — MAGNESIUM SERPL-MCNC: 2.1 MG/DL (ref 1.6–2.6)

## 2023-06-06 PROCEDURE — 1125F AMNT PAIN NOTED PAIN PRSNT: CPT | Performed by: INTERNAL MEDICINE

## 2023-06-06 PROCEDURE — 83735 ASSAY OF MAGNESIUM: CPT

## 2023-06-06 PROCEDURE — 86225 DNA ANTIBODY NATIVE: CPT

## 2023-06-06 PROCEDURE — 84207 ASSAY OF VITAMIN B-6: CPT

## 2023-06-06 PROCEDURE — 36415 COLL VENOUS BLD VENIPUNCTURE: CPT

## 2023-06-06 PROCEDURE — 86038 ANTINUCLEAR ANTIBODIES: CPT

## 2023-06-06 PROCEDURE — 99214 OFFICE O/P EST MOD 30 MIN: CPT | Performed by: INTERNAL MEDICINE

## 2023-06-09 LAB
DSDNA IGG SERPL IA-ACNC: 2.6 IU/ML
ENA AB SER QL IA: 0.2 UG/L
ENA AB SER QL IA: NEGATIVE

## 2023-06-10 LAB — VITAMIN B6: 14.1 UG/L

## 2023-06-19 RX ORDER — ESTRADIOL AND NORETHINDRONE ACETATE .5; .1 MG/1; MG/1
1 TABLET ORAL DAILY
Qty: 84 TABLET | Refills: 1 | Status: SHIPPED | OUTPATIENT
Start: 2023-06-19

## 2023-06-19 NOTE — TELEPHONE ENCOUNTER
Refill passed per [email or web address] protocol. Dr. Dewayne Mohs, please review. Per chart review, treatment not mentioned in previous office visit notes. Thank you. Per chart review, patient current with PAP and last test was normal:   Status: Final result      Dx: Screening for malignant neoplasm of c. ..      1 Result Note     1 Patient Communication      Component  Ref Range & Units 2/14/23 12:18 PM   HPV High Risk  Negative Negative    HPV Source Cervical/endocervical    Resulting Agency [facility])          . Requested Prescriptions   Pending Prescriptions Disp Refills    Estradiol-Norethindrone Acet 0.5-0.1 MG Oral Tab 84 tablet 1     Sig: Take 1 tablet by mouth daily.        Gynecology Medication Protocol Failed - 6/17/2023  9:14 AM        Failed - Pass dependent on manual look-up of last PAP and patient compliance with PAP follow up recommendations        Passed - In person appointment or virtual visit in the past 12 mos or appointment in next 3 mos     Recent Outpatient Visits              1 week ago Bilateral leg pain    Shea Dominguez, [address]    Office Visit    2 weeks ago Encounter for annual health examination    Rodrigue Warren MD    Office Visit    1 month ago Migraine without aura and with status migrainosus, not intractable    Shea Dominguez, Faizan Godoy MD    Telemedicine    4 months ago Menopause    Shea Dominguez, [address], [address], Laura Nuñez MD    Office Visit    8 months ago Chronic diarrhea    Rodrigue Warren MD    Office Visit                           Recent Outpatient Visits              1 week ago Bilateral leg pain    Dorota Lemos MD    Office Visit    2 weeks ago Encounter for annual health examination    Pilar Momin MD    Office Visit    1 month ago Migraine without aura and with status migrainosus, not intractable    Lyudmila Pendleton, Kell Medina MD    Telemedicine    4 months ago Menopause    Lyudmila Pendleton, [address], [address], Mindy Jackson MD    Office Visit    8 months ago Chronic diarrhea    Lola Peters MD    Office Visit

## 2023-06-27 ENCOUNTER — PATIENT MESSAGE (OUTPATIENT)
Dept: INTERNAL MEDICINE CLINIC | Facility: CLINIC | Age: 68
End: 2023-06-27

## 2023-06-27 DIAGNOSIS — Z80.7 FAMILY HISTORY OF MULTIPLE MYELOMA: Primary | ICD-10-CM

## 2023-07-12 ENCOUNTER — TELEPHONE (OUTPATIENT)
Dept: INTERNAL MEDICINE CLINIC | Facility: CLINIC | Age: 68
End: 2023-07-12

## 2023-07-12 RX ORDER — BENZONATATE 100 MG/1
100 CAPSULE ORAL
COMMUNITY
Start: 2023-07-07

## 2023-07-12 RX ORDER — FLUTICASONE FUROATE AND VILANTEROL 100; 25 UG/1; UG/1
1 POWDER RESPIRATORY (INHALATION)
COMMUNITY
Start: 2023-06-30

## 2023-07-14 RX ORDER — SUMATRIPTAN 100 MG/1
100 TABLET, FILM COATED ORAL EVERY 2 HOUR PRN
Qty: 9 TABLET | Refills: 3 | Status: SHIPPED | OUTPATIENT
Start: 2023-07-14

## 2023-07-14 NOTE — TELEPHONE ENCOUNTER
Refill passed per [facility], [facility] protocol. Requested Prescriptions   Pending Prescriptions Disp Refills    SUMAtriptan Succinate (IMITREX) 100 MG Oral Tab 9 tablet 1     Sig: Take 1 tablet (100 mg total) by mouth every 2 (two) hours as needed for Migraine. Use at onset; repeat once after 2 HRS-ONLY 2 doses of any sumatriptan (nasal, oral, or injectable) IN 24 HR MAX.        Neurology Medications Passed - 7/12/2023 12:41 PM        Passed - In person appointment or virtual visit in the past 6 mos or appointment in next 3 mos     Recent Outpatient Visits              1 month ago Bilateral leg pain    19[address] Ros Loaz MD    Office Visit    1 month ago Encounter for annual health examination    1923 Ros Loza MD    Office Visit    2 months ago Migraine without aura and with status migrainosus, not intractable    [address], Tami Wolf MD    Telemedicine    5 months ago Edgar Pacini, [address], Yue Avila MD    Office Visit    9 months ago Chronic diarrhea    [address] Ros Loza MD    Office Visit                           Recent Outpatient Visits              1 month ago Bilateral leg pain    19[address] Ros Loza MD    Office Visit    1 month ago Encounter for annual health examination    Wendy Farrell MD    Office Visit    2 months ago Migraine without aura and with status migrainosus, not intractable    Swati Goss MD    Telemedicine    5 months ago Menopause    [address], [address], [address], Megan Burgos MD    Office Visit    9 months ago Chronic diarrhea    Maris Geller MD    Office Visit

## 2023-07-25 DIAGNOSIS — E03.9 ACQUIRED HYPOTHYROIDISM: ICD-10-CM

## 2023-07-25 RX ORDER — LEVOTHYROXINE SODIUM 0.15 MG/1
150 TABLET ORAL
Qty: 90 TABLET | Refills: 3 | Status: SHIPPED | OUTPATIENT
Start: 2023-07-25

## 2023-07-25 NOTE — TELEPHONE ENCOUNTER
Refill passed per [facility] protocol. Please see message below and advise, thank you    Medication pended for your review / approval      Taylor ROSA Em Triage Support6 hours ago (9:48 AM)       Refills have been requested for the following medications:         famotidine 20 MG Oral Tab [Quraish Rababah]      Patient Comment: I have acute bronchospasm due to toxic air causing severe cough and SOB. This has increased all reflux sxs. I have increased famotidine  20 mg to  2 tabs. This will be necessary to 2-3 months. to 2 tabs      Requested Prescriptions   Pending Prescriptions Disp Refills    famotidine 20 MG Oral Tab 90 tablet 1     Sig: Take 1 tablet (20 mg total) by mouth nightly.        Gastrointestional Medication Protocol Passed - 7/25/2023  9:48 AM        Passed - In person appointment or virtual visit in the past 12 mos or appointment in next 3 mos     Recent Outpatient Visits              1 month ago Bilateral leg pain    Leonard Andres MD    Office Visit    1 month ago Encounter for annual health examination    Leonard Andres MD    Office Visit    2 months ago Migraine without aura and with status migrainosus, not intractable    [address], Roxane Nuñez MD    Telemedicine    5 months ago Menopause    [address], [address] [facility] [address], [address], Marquez Knutson MD    Office Visit    9 months ago Chronic diarrhea    Leonard Andres MD    Office Visit                           Recent Outpatient Visits              1 month ago Bilateral leg pain    Chelita Aly MD    Office Visit    1 month ago Encounter for annual health examination    Martha Peterson Gokul Donaldson MD    Office Visit    2 months ago Migraine without aura and with status migrainosus, not intractable    Anita Rosenthal, Jared Chappell MD    Telemedicine    5 months ago Menopause    Anita Rosenthal, [address], [address], Otto Carnes MD    Office Visit    9 months ago Chronic diarrhea    [address] [facility], Krzysztof Mccain MD    Office Visit

## 2023-07-25 NOTE — TELEPHONE ENCOUNTER
Refill passed per [place] [facility] [place] protocol.     Requested Prescriptions   Pending Prescriptions Disp Refills    levothyroxine 150 MCG Oral Tab 90 tablet 1     Sig: Take 1 tablet (150 mcg total) by mouth before breakfast.       Thyroid Medication Protocol Passed - 7/25/2023  9:49 AM        Passed - TSH in past 12 months        Passed - Last TSH value is normal     Lab Results   Component Value Date    TSH 2.050 05/30/2023    THYROIDFUNC 9.41 (H) 09/23/2014                 Passed - In person appointment or virtual visit in the past 12 mos or appointment in next 3 mos     Recent Outpatient Visits              1 month ago Bilateral leg pain    Roxanne Cadet, Hassel Goldberg, MD    Office Visit    1 month ago Encounter for annual health examination    Roxanne Cadet, Hassel Goldberg, MD    Office Visit    2 months ago Migraine without aura and with status migrainosus, not intractable    [address], Дмитрий Lennon MD    Telemedicine    5 months ago Menopause    [address], [address], Kamille Matias MD    Office Visit    9 months ago Chronic diarrhea    Roxanne Cadet, Hassel Goldberg, MD    Office Visit                           Recent Outpatient Visits              1 month ago Bilateral leg pain    Roxanne Cadet, Hassel Goldberg, MD    Office Visit    1 month ago Encounter for annual health examination    Ileana Laughlin MD    Office Visit    2 months ago Migraine without aura and with status migrainosus, not intractable    Lewis Ramirez MD    Telemedicine    5 months ago Menopause    [address], [address], [address], César Marshall MD    Office Visit    9 months ago Chronic diarrhea    Ayaka Hanna MD    Office Visit

## 2023-07-26 RX ORDER — FAMOTIDINE 20 MG/1
20 TABLET, FILM COATED ORAL NIGHTLY
Qty: 90 TABLET | Refills: 1 | Status: SHIPPED | OUTPATIENT
Start: 2023-07-26

## 2023-07-30 NOTE — TELEPHONE ENCOUNTER
Failed Protocol/No Protocol. Requested Prescriptions   Pending Prescriptions Disp Refills    betamethasone dipropionate 0.05 % External Lotion 180 mL 0     Sig: Apply 2 mL topically daily.  To scalp       There is no refill protocol information for this order            Recent Outpatient Visits              1 month ago Bilateral leg pain    [address] [facility], Maris Christianson MD    Office Visit    2 months ago Encounter for annual health examination    Alfonso Long MD    Office Visit    3 months ago Migraine without aura and with status migrainosus, not intractable    [address], Symone Yanes MD    Telemedicine    5 months ago Menopause    [address], [address] [facility],[address], [address], Caryle Craver, MD    Office Visit    10 months ago Chronic diarrhea    [address] [facility], Maris Christianson MD    Office Visit

## 2023-07-31 RX ORDER — BETAMETHASONE DIPROPIONATE 0.5 MG/G
2 LOTION TOPICAL DAILY
Qty: 180 ML | Refills: 0 | Status: SHIPPED | OUTPATIENT
Start: 2023-07-31

## 2023-07-31 NOTE — TELEPHONE ENCOUNTER
Failed Protocol/No Protocol. Requested Prescriptions   Pending Prescriptions Disp Refills    Fluocinonide 0.05 % External Solution 60 mL 0     Sig: Apply 1 Application  topically daily. There is no refill protocol information for this order       azelastine 0.1 % Nasal Solution 90 mL 0     Si sprays by Nasal route 2 (two) times daily. Allergy Medication Protocol Passed - 2023 10:44 AM        Passed - In person appointment or virtual visit in the past 12 mos or appointment in next 3 mos     Recent Outpatient Visits              1 month ago [facility]    2 months ago Encounter for annual health examination     [facility]    Office Visit    3 months ago Migraine without aura and with status migrainosus, not intractable    [address], Yue Kessler MD    Telemedicine    5 months ago Guy Harada, [address] [facility],[address], [address], Arsenio Bowman MD    Office Visit    10 months ago Chronic diarrhea     [facility]    [facility]                        mometasone furoate 50 MCG/ACT Nasal Suspension 51 g 2     Si spray by Nasal route daily.        Allergy Medication Protocol Passed - 2023 10:44 AM        Passed - In person appointment or virtual visit in the past 12 mos or appointment in next 3 mos     Recent Outpatient Visits              1 month ago Bilateral leg pain     [facility]    Office Visit    2 months ago Encounter for annual health examination    Brianna Benítez MD    Office Visit    3 months ago Migraine without aura and with status migrainosus, not intractable    Larissa Queen MD    Telemedicine    5 months ago Menopause    [address], [address], [address] [facility] [address] [facility] [address] Judith Guzman MD    Office Visit    10 months ago Chronic diarrhea    Ayaka Hanna MD    Office Visit                             Recent Outpatient Visits              1 month ago Bilateral leg pain    Ayaka Hanna MD    Office Visit    2 months ago Encounter for annual health examination    Ayaka Hanna MD    Office Visit    3 months ago Migraine without aura and with status migrainosus, not intractable    [address], Eveline Albarran MD    Telemedicine    5 months ago Menopause    [address], [address], [address], César Marshall MD    Office Visit    10 months ago Chronic diarrhea    Ayaka Hanna MD    Office Visit

## 2023-08-01 RX ORDER — MOMETASONE FUROATE 50 UG/1
1 SPRAY, METERED NASAL DAILY
Qty: 51 G | Refills: 2 | Status: SHIPPED | OUTPATIENT
Start: 2023-08-01

## 2023-08-01 RX ORDER — FLUOCINONIDE TOPICAL SOLUTION USP, 0.05% 0.5 MG/ML
1 SOLUTION TOPICAL DAILY
Qty: 60 ML | Refills: 0 | Status: SHIPPED | OUTPATIENT
Start: 2023-08-01

## 2023-08-01 RX ORDER — AZELASTINE 1 MG/ML
2 SPRAY, METERED NASAL 2 TIMES DAILY
Qty: 90 ML | Refills: 0 | Status: SHIPPED | OUTPATIENT
Start: 2023-08-01

## 2023-08-11 ENCOUNTER — PATIENT MESSAGE (OUTPATIENT)
Dept: INTERNAL MEDICINE CLINIC | Facility: CLINIC | Age: 68
End: 2023-08-11

## 2023-08-11 DIAGNOSIS — K21.00 GASTROESOPHAGEAL REFLUX DISEASE WITH ESOPHAGITIS WITHOUT HEMORRHAGE: Primary | ICD-10-CM

## 2023-08-12 ENCOUNTER — PATIENT MESSAGE (OUTPATIENT)
Dept: INTERNAL MEDICINE CLINIC | Facility: CLINIC | Age: 68
End: 2023-08-12

## 2023-08-12 DIAGNOSIS — M25.50 PAIN IN JOINT, MULTIPLE SITES: ICD-10-CM

## 2023-08-14 RX ORDER — LORAZEPAM 1 MG/1
1 TABLET ORAL 3 TIMES DAILY
Qty: 90 TABLET | Refills: 0 | Status: SHIPPED | OUTPATIENT
Start: 2023-08-14

## 2023-08-14 NOTE — TELEPHONE ENCOUNTER
Dr. Keaton Otero, please see patient's mychart message. She would like to know if you are ok with possibly prescribing medication prior to scheduling appointment.

## 2023-08-14 NOTE — TELEPHONE ENCOUNTER
Please review; protocol failed. Please see "[employer]" Message. Ilya ROSA Em Triage Support2 days ago       Refills have been requested for the following medications:         LORazepam 1 MG Oral Tab [MAKENZIE DARIUSZ]      Patient Comment: Error, 10 day supply called in. one month is quantity 90 or 3 mo supply is quantity 180. Requested Prescriptions   Pending Prescriptions Disp Refills    LORazepam 1 MG Oral Tab 30 tablet 0     Sig: Take 1 tablet (1 mg total) by mouth 3 (three) times daily.        There is no refill protocol information for this order        Recent Outpatient Visits              2 months ago Bilateral leg pain    Maris Geller MD    Office Visit    2 months ago Encounter for annual health examination    Alfonso Long MD    Office Visit    3 months ago Migraine without aura and with status migrainosus, not intractable    [address], Symone Yanes MD    Telemedicine    6 months ago Menopause    [address], [address] [facility],[address], [address], Caryle Craver, MD    Office Visit    10 months ago Chronic diarrhea    Maris Geller MD    Office Visit

## 2023-08-14 NOTE — TELEPHONE ENCOUNTER
Dr. Dewayne Mohs, please see patient's message below. From: Gayla Peterson  To: Ruth Mckeon MD  Sent: 8/12/2023  2:12 PM CDT  Subject: Lorazepam Refill    Good Afternoon,  My refill for lorazepam was quantity 30. This is an error. Most recently I was prescribed #90, however, in the past, like with all meds, I am prescribed a 90 day supply or #180. My last prescription dated 6/5/23 was for #90. My pain level was down, my sleep is better so I did not take 3 daily. When I can, I do not take as much. Obviously I stretched the RX for almost another month. I currently have appts. with sports medicine for chronic R rotator cuff tears, and R hip bursitis and  R ischial bursitis. I have appt. with physical medicine/rehab medicine for recurrent R hip capsulitis. Lorazepam help me sleep, so I do not have to take opiate for pain. What is your medical rational for changing a mediation regimen that is effective?   Sincerely ,   Joon Collado MD

## 2023-08-21 ENCOUNTER — PATIENT MESSAGE (OUTPATIENT)
Dept: INTERNAL MEDICINE CLINIC | Facility: CLINIC | Age: 68
End: 2023-08-21

## 2023-08-21 NOTE — TELEPHONE ENCOUNTER
"Subjective   Luis Fernando Thorne is a 21 y.o. male.     Chief Complaint   Patient presents with   • Allergies   • Earache     right   • Cough       History of Present Illness     New pt    C/o being sick with cough and congestion for 3- 4days, no fever  No Covid contacts.  The following portions of the patient's history were reviewed and updated as appropriate: allergies, current medications, past family history, past medical history, past social history, past surgical history and problem list.    Past Medical History:   Diagnosis Date   • Asthma    • Cognitive and behavioral changes    • Heart murmur        Past Surgical History:   Procedure Laterality Date   • ADENOIDECTOMY     • TONSILLECTOMY     • TYMPANOSTOMY         Family History   Problem Relation Age of Onset   • Diabetes Paternal Grandmother    • Diabetes Paternal Grandfather        Social History     Socioeconomic History   • Marital status: Single     Spouse name: Not on file   • Number of children: Not on file   • Years of education: Not on file   • Highest education level: Not on file   Tobacco Use   • Smoking status: Never Smoker   • Smokeless tobacco: Never Used   Substance and Sexual Activity   • Alcohol use: Never   • Drug use: Never   • Sexual activity: Never       No current outpatient medications on file prior to visit.     No current facility-administered medications on file prior to visit.       Review of Systems   HENT: Positive for congestion, ear pain and sore throat.    Respiratory: Positive for cough. Negative for shortness of breath.        No results found for this or any previous visit (from the past 4704 hour(s)).  Objective   Vitals:    07/22/21 1520   BP: 100/67   Pulse: 72   Temp: 97.3 °F (36.3 °C)   SpO2: 95%   Weight: 61.4 kg (135 lb 6 oz)   Height: 170.2 cm (67\")     Body mass index is 21.2 kg/m².  Physical Exam  Vitals and nursing note reviewed.   Constitutional:       General: He is not in acute distress.     Appearance: Normal " From: Kalli Peterson  To: Siddharth Campoverde MD  Sent: 8/21/2023 8:44 AM CDT  Subject: refill for Rx not on list    Good Morning,    Refill request for:   Valacyclovir 500mg #32 Sig Take 4 tablets every 12 hrs as needed for flare-ups. Thank you.  Daylin Lockett. appearance. He is well-developed. He is not diaphoretic.   HENT:      Right Ear: Tympanic membrane normal.      Ears:      Comments: LT TM effusion     Mouth/Throat:      Mouth: Mucous membranes are moist.   Cardiovascular:      Rate and Rhythm: Normal rate and regular rhythm.   Pulmonary:      Effort: Pulmonary effort is normal. No respiratory distress.      Breath sounds: Normal breath sounds. No wheezing.           Diagnoses and all orders for this visit:    1. Recurrent acute serous otitis media of left ear (Primary)  -     amoxicillin (AMOXIL) 875 MG tablet; Take 1 tablet by mouth 2 (Two) Times a Day for 5 days.  Dispense: 10 tablet; Refill: 0    2. Cognitive attention deficit  -     FLUoxetine (PROzac) 20 MG capsule; Take 1 capsule by mouth Daily.  Dispense: 90 capsule; Refill: 3  -     divalproex (Depakote) 500 MG DR tablet; Take 1 tablet by mouth 3 (Three) Times a Day.  Dispense: 60 tablet; Refill: 11    Return if symptoms worsen or fail to improve.

## 2023-08-25 NOTE — TELEPHONE ENCOUNTER
Left message to call back. [employer] sent. Transfer to triage.
Refilled but she would benefit from eval with gyn not for pap but for her HRT
none

## 2023-09-02 ENCOUNTER — LAB ENCOUNTER (OUTPATIENT)
Dept: LAB | Facility: HOSPITAL | Age: 68
End: 2023-09-02
Attending: INTERNAL MEDICINE
Payer: MEDICARE

## 2023-09-02 DIAGNOSIS — Z80.7 FAMILY HISTORY OF MULTIPLE MYELOMA: ICD-10-CM

## 2023-09-02 DIAGNOSIS — K21.00 GASTROESOPHAGEAL REFLUX DISEASE WITH ESOPHAGITIS WITHOUT HEMORRHAGE: ICD-10-CM

## 2023-09-02 LAB
BASOPHILS # BLD AUTO: 0.05 X10(3) UL (ref 0–0.2)
BASOPHILS NFR BLD AUTO: 0.6 %
DEPRECATED RDW RBC AUTO: 45.3 FL (ref 35.1–46.3)
EOSINOPHIL # BLD AUTO: 0.03 X10(3) UL (ref 0–0.7)
EOSINOPHIL NFR BLD AUTO: 0.4 %
ERYTHROCYTE [DISTWIDTH] IN BLOOD BY AUTOMATED COUNT: 13.1 % (ref 11–15)
HCT VFR BLD AUTO: 42.1 %
HGB BLD-MCNC: 14 G/DL
IMM GRANULOCYTES # BLD AUTO: 0.04 X10(3) UL (ref 0–1)
IMM GRANULOCYTES NFR BLD: 0.5 %
LYMPHOCYTES # BLD AUTO: 2.64 X10(3) UL (ref 1–4)
LYMPHOCYTES NFR BLD AUTO: 32.1 %
MCH RBC QN AUTO: 31.6 PG (ref 26–34)
MCHC RBC AUTO-ENTMCNC: 33.3 G/DL (ref 31–37)
MCV RBC AUTO: 95 FL
MONOCYTES # BLD AUTO: 0.62 X10(3) UL (ref 0.1–1)
MONOCYTES NFR BLD AUTO: 7.5 %
NEUTROPHILS # BLD AUTO: 4.85 X10 (3) UL (ref 1.5–7.7)
NEUTROPHILS # BLD AUTO: 4.85 X10(3) UL (ref 1.5–7.7)
NEUTROPHILS NFR BLD AUTO: 58.9 %
PLATELET # BLD AUTO: 229 10(3)UL (ref 150–450)
RBC # BLD AUTO: 4.43 X10(6)UL
VIT B12 SERPL-MCNC: 348 PG/ML (ref 193–986)
WBC # BLD AUTO: 8.2 X10(3) UL (ref 4–11)

## 2023-09-02 PROCEDURE — 82607 VITAMIN B-12: CPT

## 2023-09-02 PROCEDURE — 36415 COLL VENOUS BLD VENIPUNCTURE: CPT

## 2023-09-02 PROCEDURE — 84165 PROTEIN E-PHORESIS SERUM: CPT

## 2023-09-02 PROCEDURE — 85025 COMPLETE CBC W/AUTO DIFF WBC: CPT

## 2023-09-02 PROCEDURE — 85307 ASSAY ACTIVATED PROTEIN C: CPT

## 2023-09-02 PROCEDURE — 82785 ASSAY OF IGE: CPT

## 2023-09-05 ENCOUNTER — PATIENT MESSAGE (OUTPATIENT)
Dept: INTERNAL MEDICINE CLINIC | Facility: CLINIC | Age: 68
End: 2023-09-05

## 2023-09-05 LAB
ALBUMIN SERPL ELPH-MCNC: 3.94 G/DL (ref 3.75–5.21)
ALBUMIN/GLOB SERPL: 1.6 {RATIO} (ref 1–2)
ALPHA1 GLOB SERPL ELPH-MCNC: 0.2 G/DL (ref 0.19–0.46)
ALPHA2 GLOB SERPL ELPH-MCNC: 0.76 G/DL (ref 0.48–1.05)
B-GLOBULIN SERPL ELPH-MCNC: 0.73 G/DL (ref 0.68–1.23)
GAMMA GLOB SERPL ELPH-MCNC: 0.78 G/DL (ref 0.62–1.7)
IGE SERPL-ACNC: 7.33 KU/L (ref 2–214)
PROT SERPL-MCNC: 6.4 G/DL (ref 6.4–8.2)

## 2023-09-05 NOTE — TELEPHONE ENCOUNTER
From: Gayla Peterson  To: Felice Schaefer MD  Sent: 9/5/2023 7:15 AM CDT  Subject: Patient history update    Please add to my medical record:  9/2/2023. Shingles booster  9/2/2023.  RSV vaccine  Thank you  Eli Coburn.

## 2023-09-07 LAB — ACT PROT C RESIST: 3.1 RATIO

## 2023-09-12 ENCOUNTER — TELEPHONE (OUTPATIENT)
Dept: PHYSICAL MEDICINE AND REHAB | Facility: CLINIC | Age: 68
End: 2023-09-12

## 2023-09-12 RX ORDER — ESTRADIOL AND NORETHINDRONE ACETATE .5; .1 MG/1; MG/1
1 TABLET ORAL DAILY
Qty: 84 TABLET | Refills: 1 | OUTPATIENT
Start: 2023-09-12

## 2023-09-14 ENCOUNTER — TELEPHONE (OUTPATIENT)
Dept: PHYSICAL MEDICINE AND REHAB | Facility: CLINIC | Age: 68
End: 2023-09-14

## 2023-09-15 ENCOUNTER — OFFICE VISIT (OUTPATIENT)
Dept: INTERNAL MEDICINE CLINIC | Facility: CLINIC | Age: 68
End: 2023-09-15

## 2023-09-15 VITALS
DIASTOLIC BLOOD PRESSURE: 74 MMHG | WEIGHT: 140.19 LBS | OXYGEN SATURATION: 97 % | RESPIRATION RATE: 18 BRPM | HEART RATE: 91 BPM | SYSTOLIC BLOOD PRESSURE: 127 MMHG | BODY MASS INDEX: 23.93 KG/M2 | HEIGHT: 64 IN

## 2023-09-15 DIAGNOSIS — G89.29 CHRONIC MIDLINE LOW BACK PAIN WITHOUT SCIATICA: ICD-10-CM

## 2023-09-15 DIAGNOSIS — G57.01 PIRIFORMIS SYNDROME OF RIGHT SIDE: ICD-10-CM

## 2023-09-15 DIAGNOSIS — M46.1 INFLAMMATION OF RIGHT SACROILIAC JOINT (HCC): ICD-10-CM

## 2023-09-15 DIAGNOSIS — M54.50 CHRONIC MIDLINE LOW BACK PAIN WITHOUT SCIATICA: ICD-10-CM

## 2023-09-15 DIAGNOSIS — Z12.31 ENCOUNTER FOR SCREENING MAMMOGRAM FOR MALIGNANT NEOPLASM OF BREAST: Primary | ICD-10-CM

## 2023-09-15 PROCEDURE — 99214 OFFICE O/P EST MOD 30 MIN: CPT | Performed by: INTERNAL MEDICINE

## 2023-09-15 PROCEDURE — 1125F AMNT PAIN NOTED PAIN PRSNT: CPT | Performed by: INTERNAL MEDICINE

## 2023-09-19 RX ORDER — ESTRADIOL AND NORETHINDRONE ACETATE .5; .1 MG/1; MG/1
1 TABLET ORAL DAILY
Qty: 84 TABLET | Refills: 3 | Status: SHIPPED | OUTPATIENT
Start: 2023-09-19

## 2023-09-19 NOTE — TELEPHONE ENCOUNTER
Refill passed per [facility] protocol  Last Pap 2/14/23: normal    Requested Prescriptions   Pending Prescriptions Disp Refills    ESTRADIOL-NORETHINDRONE ACET 0.5-0.1 MG Oral Tab [Pharmacy Med Name: ESTRA 0.5MG/NORETH 0.1MG TB LOW STR] 84 tablet 1     Sig: Take 1 tablet by mouth daily. Gynecology Medication Protocol Failed - 9/18/2023 12:07 PM        Failed - Pass dependent on manual look-up of last PAP and patient compliance with PAP follow up recommendations        Passed - In person appointment or virtual visit in the past 12 mos or appointment in next 3 mos     Recent Outpatient Visits              4 days ago Encounter for screening mammogram for malignant neoplasm of breast    Joeseph Cotton, Odean Landau, MD    Office Visit    3 months ago Bilateral leg pain    Joeseph Cotton, Odean Landau, MD    Office Visit    3 months ago Encounter for annual health examination    Joeseph Cotton, Odean Landau, MD    Office Visit    4 months ago Migraine without aura and with status migrainosus, not intractable    Lonnie Horne MD    Telemedicine    7 months ago Menopause    Miki Godoy, [address], [address] [facility]    [facility] Visit          Future Appointments         Provider Department Appt Notes    In 3 weeks MD Jenna Fink, [address] steroid inj, PT, dry needling. LIve in [address]. Need . Also    In 1 month [facility] MRI RM1 (1.5T WIDE) [address] [facility]     In 1 month Ted Bernardo MD [facility], [address], [facility] sliding hiatal hernia. Cough, dyspnea, diffuse chest pain x 2mo. in   after Rx. Pulm. w/up neg.     In 2 months Gregory Ellis MD [address] [facility] Sliding hernia dx'd by Galen'willie Bhatia. Wheezing, cough, diffuse chest pain x 2mo. Pulmo. w/up negative.

## 2023-10-04 DIAGNOSIS — M25.50 PAIN IN JOINT, MULTIPLE SITES: ICD-10-CM

## 2023-10-05 NOTE — TELEPHONE ENCOUNTER
Please review; No Protocol  Rx Pended, authorize if appropriate    Requested Prescriptions   Pending Prescriptions Disp Refills    LORAZEPAM 1 MG Oral Tab [Pharmacy Med Name: LORAZEPAM 1MG TABLETS] 90 tablet 0     Sig: TAKE 1 TABLET(1 MG) BY MOUTH THREE TIMES DAILY       There is no refill protocol information for this order          Recent Outpatient Visits              2 weeks ago Encounter for screening mammogram for malignant neoplasm of breast    Feli Baxter MD    Office Visit    4 months ago Bilateral leg pain    Feli Baxter MD    Office Visit    4 months ago Encounter for annual health examination    Roxie Montana MD    Office Visit    5 months ago Migraine without aura and with status migrainosus, not intractable    [address], [address] [place] , [place] Garrett Jenkins MD    Telemedicine    7 months ago Menopause    [address], [address], [address], Luis Hu MD    Office Visit          Future Appointments         Provider Department Appt Notes    In 6 days Cyn Milton MD [address] [place] [address], [address] [facility] [address] steroid inj, PT, dry needling. LIve in [address]. Need closer. Also    In 2 months Yury Alva MD [address] [place] [address], [place] Sliding hernia dx'd by endosc. Wheezing, cough, diffuse chest pain x 2mo. Pulmo. w/up negative.

## 2023-10-06 RX ORDER — LORAZEPAM 1 MG/1
1 TABLET ORAL 3 TIMES DAILY
Qty: 90 TABLET | Refills: 0 | Status: SHIPPED | OUTPATIENT
Start: 2023-10-06

## 2023-10-30 RX ORDER — FAMOTIDINE 20 MG/1
20 TABLET, FILM COATED ORAL NIGHTLY
Qty: 90 TABLET | Refills: 1 | OUTPATIENT
Start: 2023-10-30

## 2023-11-02 RX ORDER — FAMOTIDINE 20 MG/1
20 TABLET, FILM COATED ORAL NIGHTLY
Qty: 90 TABLET | Refills: 1 | OUTPATIENT
Start: 2023-11-02

## 2023-11-19 DIAGNOSIS — M25.50 PAIN IN JOINT, MULTIPLE SITES: ICD-10-CM

## 2023-11-21 RX ORDER — LORAZEPAM 1 MG/1
1 TABLET ORAL 3 TIMES DAILY
Qty: 90 TABLET | Refills: 0 | Status: SHIPPED | OUTPATIENT
Start: 2023-11-21

## 2023-11-21 RX ORDER — LORAZEPAM 1 MG/1
1 TABLET ORAL 3 TIMES DAILY
Qty: 90 TABLET | Refills: 0 | OUTPATIENT
Start: 2023-11-21

## 2023-11-21 NOTE — TELEPHONE ENCOUNTER
Please review; no protocol  Medication pended for your review and approval.    Requested Prescriptions   Pending Prescriptions Disp Refills    LORAZEPAM 1 MG Oral Tab [Pharmacy Med Name: LORAZEPAM 1MG TABLETS] 90 tablet 0     Sig: TAKE 1 TABLET(1 MG) BY MOUTH THREE TIMES DAILY       There is no refill protocol information for this order

## 2023-12-08 ENCOUNTER — NURSE TRIAGE (OUTPATIENT)
Dept: INTERNAL MEDICINE CLINIC | Facility: CLINIC | Age: 68
End: 2023-12-08

## 2023-12-08 NOTE — TELEPHONE ENCOUNTER
Action Requested: Summary for Provider     []  Critical Lab, Recommendations Needed  [] Need Additional Advice  []   FYI    []   Need Orders  [x] Need Medications Sent to Pharmacy  [x]  Other     SUMMARY: Patient requesting early release of refill for Imitrex and nasal refill. Please advise if ok, and if follow up appt is advised. Reason for call: Headache  Onset: +2 weeks    Patient reports hx of migraines, has been with ongoing migraines for 2-3 weeks that began after an ablation procedure. She believes the prednisone may have triggered an increase in her migraines. Since her procedure has had re-occurring migraines, over the last week has been daily. Migraines cause head pain, light sensitivity, ear/eye pain, vertigo and nausea. Has been taking her Imitrex daily that does help but symptoms start again in the afternoon, because of this she has taken her Imitrex more often than usual. Is requesting if able to refill early due to upcoming travel on 12/19. Also requesting refill of nasal Imitrex; only uses when absolutely necessary as it's more expensive. Pt noted is also on Meloxicam for pain to ankle due to prior ankle fracture. Patient agrees to follow up appt, no openings next week and pt will travel week after. Please advise if appt is recommended and if so, if ok to wait until after her travels or if able to accommodate appt next week.       Reason for Disposition   Headache is a chronic symptom (recurrent or ongoing AND lasting > 4 weeks)    Protocols used: Headache-A-[place]

## 2023-12-09 RX ORDER — SUMATRIPTAN 5 MG/1
SPRAY NASAL
Qty: 5 EACH | Refills: 1 | Status: SHIPPED | OUTPATIENT
Start: 2023-12-09

## 2023-12-09 NOTE — TELEPHONE ENCOUNTER
Left detailed message as per Nnamdi Azul regarding below. To call back to schedule appointment or with questions. Number and hours provided.     Future Appointments   Date Time Provider Shaun Castle   1/29/2024 11:00 AM Yoli Galvan MD 2014 [facility]

## 2023-12-11 ENCOUNTER — TELEPHONE (OUTPATIENT)
Dept: INTERNAL MEDICINE CLINIC | Facility: CLINIC | Age: 68
End: 2023-12-11

## 2023-12-11 NOTE — TELEPHONE ENCOUNTER
Called [employer], verified patient's Name and . Patient requested more than 18 tablets of sumatriptan from insurance. [employer] states that patient's insurance is only covering maximum of 18 tablets per month. Needs approval from PCP. Dr. Sammy Ledezma please advise.

## 2023-12-11 NOTE — TELEPHONE ENCOUNTER
Spoke with pharmacy who states pt would like Sumatripan 20 mg nasal spray. Spoke with pt who states she spoke with another RN today and needs forms filled out for the tablets, that the nasal spray is too expensive.   See 12/8/23 TE

## 2023-12-11 NOTE — TELEPHONE ENCOUNTER
Spoke to patient. She has multiple requests. She said that she has spoken to [facility] department regarding her quantity limits and need for early refills for her Imitrex. She said that [facility] is supposed to send over a form that needs to be filled out indicating her medical history. The conditions patient would like listed are:    Left chronic treatment resistant migraines. Left cervical radiculopathy  Chronic left arm pain/weakness due to arthritis. Chronic rhinitis and sinusitis related to hiatal hernia/GERD. She also said that in order for her to get her medication- an early refill due to travel exception needs to be made. She was told insurance needs the dates of travel:    [facility] and back to [facility] on 12/22. [address], please watch for forms from [facility]. Patient is hoping this can be completed today as she is almost out of medication and without this information, the medication would cost $1300 out of pocket. Dr. Chase Rivas, please advise. See also Headache nurse triage encounter from 12/8/23- below.

## 2023-12-11 NOTE — TELEPHONE ENCOUNTER
[employer] called to very quantity of medication for Sumatriptan, patient is requesting quantity greater than 18 tabs a month.        Option 3 for medicare  Reference [id number]

## 2023-12-11 NOTE — TELEPHONE ENCOUNTER
Pharmacy calling regarding:  SUMAtriptan 5 MG/ACT Nasal Solution     Patient was given 20mg instead of 5mg by pharmacy, they notified patient and patient advised that the 20 mg worked for her, and does not believe the 5 mg will work now.  Asking for a prescription for 20 Mg, Please advise

## 2023-12-13 ENCOUNTER — TELEMEDICINE (OUTPATIENT)
Dept: INTERNAL MEDICINE CLINIC | Facility: CLINIC | Age: 68
End: 2023-12-13

## 2023-12-13 DIAGNOSIS — G43.709 CHRONIC MIGRAINE W/O AURA W/O STATUS MIGRAINOSUS, NOT INTRACTABLE: Primary | ICD-10-CM

## 2023-12-13 PROCEDURE — 99214 OFFICE O/P EST MOD 30 MIN: CPT

## 2023-12-13 RX ORDER — SUMATRIPTAN 20 MG/1
1 SPRAY NASAL EVERY 2 HOUR PRN
Qty: 1 EACH | Refills: 1 | Status: SHIPPED | OUTPATIENT
Start: 2023-12-13

## 2023-12-13 NOTE — PROGRESS NOTES
This is a telemedicine visit with live, interactive video and audio. Patient understands and accepts financial responsibility for any deductible, co-insurance and/or co-pays associated with this service. SUBJECTIVE  C/o problems getting her migraine medications for the past week - is on both oral and intranasal sumatriptan  Early refill was declined by her insurance, she requested this as she is having more migraine days this month and will also be leaving town on Sunday  Is getting migraines daily or every other day   Can have them daily for a week or so and needs 2 tablets each time of the sumatriptan 100mg   Could go for many months without migraines but then they can be consistent for 3-4 weeks  Uses nasal sumatriptan when she runs out of tablets     States paperwork was sent to the office (patient of Dr. Demian Hughes) to approve higher # of pills   Spoke to [facility] and stated it would help to list concurrent problems  Cervical radiculopathy, chronic migraine, lupus and sjogren's and chronic rhinitis and sinusitis  Reference # [id number]     Has tried migraine prophylaxis medication in the past - amitriptyline, topamax and propranolol without success  She is a retired physician   Will look into newer preventive treatments and discuss with her PCP    States pharmacy is also holding a refill of estradiol-norethindrone   Says we have the incorrect dose of sumatriptan nasal, she is on the 20mg nasal spray but latest order was 5mg  Did look back in her chart and see 20mg dose listed previously      RN telephone encounter: 12/11/2023  Spoke to patient. She has multiple requests. She said that she has spoken to [facility] department regarding her quantity limits and need for early refills for her Imitrex. She said that [facility] is supposed to send over a form that needs to be filled out indicating her medical history. The conditions patient would like listed are:     Left chronic treatment resistant migraines.    Left cervical radiculopathy  Chronic left arm pain/weakness due to arthritis. Chronic rhinitis and sinusitis related to hiatal hernia/GERD. She also said that in order for her to get her medication- an early refill due to travel exception needs to be made. She was told insurance needs the dates of travel:     Flying to [facility]  and back to [facility] on . [address], please watch for forms from [address]. Patient is hoping this can be completed today as she is almost out of medication and without this information, the medication would cost $1300 out of pocket. Dr. Tennille Gómez, please advise. See also Headache nurse triage encounter from 23- below.            HISTORY:  Past Medical History:   Diagnosis Date    Arthritis     Autoimmune disorder ([facility] [address]     Lupus    Cervical disc displacement     C4-C5, C5-C6 disc displacement    Chronic pain     Right foot after  foot surgery    Difficulty urinating     Hiatal hernia with gastroesophageal reflux     Hypothyroidism     Infertility     Interstitial cystitis     Migraine     Peripheral neuropathy     Reactive depression (situational)     when daughter     Scalp psoriasis     SI (sacroiliac) joint dysfunction     Sjogren's disease ([address] 2005    Trigeminal neuralgia pain     Vaginal stricture     Vertigo       Past Surgical History:   Procedure Laterality Date    ARTHROSCOPY, SHOULDER, SURGICAL; W/ROTATOR CUFF REPAIR Right     subclavian reconstruction      BIOPSY OF SKIN LESION  2020    CATARACT EXTRACTION Bilateral     COLONOSCOPY  2012    CORRECT BUNION,SIMPLE Right     EGD      FOOT FRACTURE SURGERY Right     OTHER SURGICAL HISTORY      heel: sural nerve neuroma excision -- achilles tendon accidentally injured during surgery      Family History   Problem Relation Age of Onset    Dementia Father         dementia induced by MRSA sepsis (cause of death)    Infectious Disease Father         MRSA sepsis (cause of death)    Breast Cancer Maternal Aunt 72    Hypertension Mother     Obesity Mother     Fibromyalgia Sister     Alcohol and Other Disorders Associated Brother         alcoholism    Depression Other         family h/o    Other (drug use) Brother       Social History     Socioeconomic History    Marital status: Single    Number of children: 0   Occupational History    Occupation: psychiatrist     Comment: retired   Tobacco Use    Smoking status: Never    Smokeless tobacco: Never   Vaping Use    Vaping Use: Never used   Substance and Sexual Activity    Alcohol use: Not Currently     Comment: rare    Drug use: Not Currently    Sexual activity: Not Currently   Other Topics Concern    Caffeine Concern Yes     Comment: tea, 1 cup        Allergies   Allergen Reactions    Gabapentin OTHER (SEE COMMENTS)     Upper and lower extremity ataxia and cognitive impairment     Ropinirole OTHER (SEE COMMENTS)     Vertigo     Naproxen OTHER (SEE COMMENTS)    Sulfa Antibiotics HIVES    Trimethoprim HIVES    Biaxin [Clarithromycin] ITCHING    Sulfamethoxazole W/Trimethoprim ITCHING      Current Outpatient Medications   Medication Sig Dispense Refill    SUMAtriptan 20 MG/ACT Nasal Solution 1 spray by Nasal route every 2 (two) hours as needed for Migraine. 1 each 1    LORazepam 1 MG Oral Tab Take 1 tablet (1 mg total) by mouth 3 (three) times daily. 90 tablet 0    Estradiol-Norethindrone Acet 0.5-0.1 MG Oral Tab Take 1 tablet by mouth daily. 84 tablet 3    valACYclovir 500 MG Oral Tab       guaiFENesin-Codeine 100-10 MG/5ML Oral Syrup       Fluocinonide 0.05 % External Solution Apply 1 Application  topically daily. 60 mL 0    azelastine 0.1 % Nasal Solution 2 sprays by Nasal route 2 (two) times daily. 90 mL 0    mometasone furoate 50 MCG/ACT Nasal Suspension 1 spray by Nasal route daily. 51 g 2    betamethasone dipropionate 0.05 % External Lotion Apply 2 mL topically daily.  To scalp 180 mL 0    famotidine 20 MG Oral Tab Take 1 tablet (20 mg total) by mouth nightly. 90 tablet 1    levothyroxine 150 MCG Oral Tab Take 1 tablet (150 mcg total) by mouth before breakfast. 90 tablet 3    SUMAtriptan Succinate (IMITREX) 100 MG Oral Tab Take 1 tablet (100 mg total) by mouth every 2 (two) hours as needed for Migraine. Use at onset; repeat once after 2 HRS-ONLY 2 doses of any sumatriptan (nasal, oral, or injectable) IN 24 HR MAX. 9 tablet 3    albuterol (5 MG/ML) 0.5% Inhalation Nebu Soln Take 0.5 mL (2.5 mg total) by nebulization. benzonatate 100 MG Oral Cap Take 1 capsule (100 mg total) by mouth. fluticasone furoate-vilanterol (BREO ELLIPTA) 100-25 MCG/ACT Inhalation Aerosol Powder, Breath Activated Inhale 1 puff into the lungs. predniSONE 10mg 1 tablet. Pseudoephedrine HCl (SUDAFED OR) Take by mouth. Ibuprofen (MOTRIN OR) Take 200 mg by mouth.      levocetirizine 5 MG Oral Tab Take 1 tablet (5 mg total) by mouth every evening. 90 tablet 3    Azelaic Acid (FINACEA) 15 % External Gel Apply topically daily. 1 each 0    SUMAtriptan (SUMATRIPTAN SUCCINATE) 6 MG/0.5ML Subcutaneous Solution Inject 0.5 mL (6 mg total) into the skin daily as needed. 4 mL 0    Syringe, Disposable, (EASY GLIDE LUER LOCK SYRINGE) 1 ML Does not apply Misc Use with sumatriptan daily prn. 25 each 0    Needle, Disp, 26G X 1/2\" Does not apply Misc Use with Imitrex injection. 25 each 0    Syringe, Disposable, 1 ML Does not apply Misc Use with sumatriptan injectable solution 2 each 1    ondansetron 4 MG Oral Tablet Dispersible Take 1 tablet (4 mg total) by mouth every 8 (eight) hours as needed for Nausea. 20 tablet 0    cholecalciferol 50 MCG (2000 UT) Oral Cap Take 1 capsule (2,000 Units total) by mouth daily. Take 2 tablets daily 90 capsule 0    Fluocinonide 0.05 % External Solution Apply 1 Application. topically daily. 60 mL 0    cyclobenzaprine 10 MG Oral Tab Take 1 tablet (10 mg total) by mouth nightly as needed for Muscle spasms.  20 tablet 1    Clobetasol Propionate 0.05 % External Foam APPLY 1 GRAM TOPICALLY AS DIRECTED 50 g 0       OBJECTIVE  Physical Exam:   alert, appears stated age, cooperative, and no distress    ASSESSMENT & PLAN  Diagnoses and all orders for this visit:    Chronic migraine w/o aura w/o status migrainosus, not intractable  -     SUMAtriptan 20 MG/ACT Nasal Solution; 1 spray by Nasal route every 2 (two) hours as needed for Migraine.       16m 00s  Total time spent: 25min    China Bautista, APRN

## 2023-12-18 NOTE — TELEPHONE ENCOUNTER
18 Tablets of sumatriptan is the standard prescription. Patient is not supposed to be taking more than 2 tablets within 24 hours. I do not approve of a larger amount as it can be dangerous.

## 2024-01-01 ENCOUNTER — PATIENT MESSAGE (OUTPATIENT)
Dept: INTERNAL MEDICINE CLINIC | Facility: CLINIC | Age: 69
End: 2024-01-01

## 2024-01-02 RX ORDER — AZELASTINE 1 MG/ML
2 SPRAY, METERED NASAL 2 TIMES DAILY
Qty: 90 ML | Refills: 1 | Status: SHIPPED | OUTPATIENT
Start: 2024-01-02

## 2024-01-02 RX ORDER — IPRATROPIUM BROMIDE 42 UG/1
2 SPRAY, METERED NASAL 4 TIMES DAILY
Qty: 42 ML | Refills: 3 | Status: SHIPPED | OUTPATIENT
Start: 2024-01-02

## 2024-01-02 NOTE — TELEPHONE ENCOUNTER
Refill passed per [facility] protocol.  Requested Prescriptions   Pending Prescriptions Disp Refills    azelastine 0.1 % Nasal Solution 90 mL 0     Si sprays by Nasal route 2 (two) times daily.       Allergy Medication Protocol Passed - 2024  8:27 AM        Passed - In person appointment or virtual visit in the past 12 mos or appointment in next 3 mos     Recent Outpatient Visits              2 weeks ago Chronic migraine w/o aura w/o status migrainosus, not intractable    [facility]    [facility]medicine    3 months ago [facility] for [facility] for malignant neoplasm [facility]    [facility] Visit    7 months ago Bilateral leg pain    [facility]    [facility] Visit    7 months ago [facility] for annual health examination    [facility]    [facility] Visit    8 months ago Migraine without aura and with status migrainosus, not intractable    [facility], [place] Chicas Arlinda, MD    Telemedicine          Future Appointments         Provider Department Appt Notes    In 3 weeks Kimmy Mcpherson MD [facility]                   Recent Outpatient Visits              2 weeks ago Chronic migraine w/o aura w/o status migrainosus, not intractable    [facility]    Telemedicine    3 months ago Encounter for screening mammogram for malignant neoplasm of breast    [facility]    Office Visit    7 months ago Bilateral leg pain    [facility]    Office Visit    7 months ago Encounter for  annual health examination    [facility] Alessandro Rodríguez MD    Office Visit    8 months ago Migraine without aura and with status migrainosus, not intractable    [facility], [place] Antoine Arlinda, MD    Telemedicine          Future Appointments         Provider Department Appt Notes    In 3 weeks Kimmy Mcpherson MD [facility]

## 2024-01-12 DIAGNOSIS — M25.50 PAIN IN JOINT, MULTIPLE SITES: ICD-10-CM

## 2024-01-12 RX ORDER — LORAZEPAM 1 MG/1
1 TABLET ORAL 3 TIMES DAILY
Qty: 90 TABLET | Refills: 0 | Status: SHIPPED | OUTPATIENT
Start: 2024-01-12

## 2024-01-12 NOTE — TELEPHONE ENCOUNTER
Please review.  Protocol failed / Has no protocol.    Recent fills: 8/20, 10/6, 11/21 each qty 90     Requested Prescriptions   Pending Prescriptions Disp Refills    LORAZEPAM 1 MG Oral Tab [Pharmacy Med Name: LORAZEPAM 1MG TABLETS] 90 tablet 0     Sig: TAKE 1 TABLET(1 MG) BY MOUTH THREE TIMES DAILY       There is no refill protocol information for this order        Future Appointments         Provider Department Appt Notes    In 2 weeks Kimmy Mcphersno MD [facility]            Recent Outpatient Visits              1 month ago Chronic migraine w/o aura w/o status migrainosus, not intractable    [facility], [facility] Dina Narvaez APRN    Telemedicine    3 months ago Encounter for screening mammogram for malignant neoplasm of breast    [facility]    Office Visit    7 months ago Bilateral leg pain    [facility]    Office Visit    7 months ago Encounter for annual health examination    [facility]    [facility] Visit    8 months ago Migraine without aura and with status migrainosus, not intractable    [facility], [place] Antoine Arlinda, MD    Telemedicine

## 2024-01-21 ENCOUNTER — PATIENT MESSAGE (OUTPATIENT)
Dept: INTERNAL MEDICINE CLINIC | Facility: CLINIC | Age: 69
End: 2024-01-21

## 2024-01-22 ENCOUNTER — PATIENT OUTREACH (OUTPATIENT)
Dept: SURGERY | Facility: CLINIC | Age: 69
End: 2024-01-22

## 2024-01-22 ENCOUNTER — OFFICE VISIT (OUTPATIENT)
Dept: INTERNAL MEDICINE CLINIC | Facility: CLINIC | Age: 69
End: 2024-01-22
Payer: MEDICARE

## 2024-01-22 VITALS
WEIGHT: 140 LBS | HEART RATE: 83 BPM | BODY MASS INDEX: 23.9 KG/M2 | SYSTOLIC BLOOD PRESSURE: 116 MMHG | OXYGEN SATURATION: 97 % | HEIGHT: 64 IN | DIASTOLIC BLOOD PRESSURE: 77 MMHG

## 2024-01-22 DIAGNOSIS — G43.001 MIGRAINE WITHOUT AURA AND WITH STATUS MIGRAINOSUS, NOT INTRACTABLE: Primary | ICD-10-CM

## 2024-01-22 DIAGNOSIS — M54.2 CERVICAL PAIN: Primary | ICD-10-CM

## 2024-01-22 DIAGNOSIS — M54.12 CERVICAL RADICULOPATHY: ICD-10-CM

## 2024-01-22 DIAGNOSIS — G43.709 CHRONIC MIGRAINE W/O AURA W/O STATUS MIGRAINOSUS, NOT INTRACTABLE: ICD-10-CM

## 2024-01-22 RX ORDER — SUMATRIPTAN 100 MG/1
100 TABLET, FILM COATED ORAL EVERY 2 HOUR PRN
Qty: 30 TABLET | Refills: 3 | Status: SHIPPED | OUTPATIENT
Start: 2024-01-22

## 2024-01-22 RX ORDER — SUMATRIPTAN 20 MG/1
1 SPRAY NASAL EVERY 2 HOUR PRN
Qty: 1 EACH | Refills: 1 | Status: SHIPPED | OUTPATIENT
Start: 2024-01-22

## 2024-01-22 NOTE — PROGRESS NOTES
Outpatient Office Note    HPI:     Debbie Peterson is a 68 year old female.  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     Suffers from migraine.  Reports that she sometimes get migraine headaches daily for 3 weeks in a row.  As a result she takes sumatriptan twice daily to help abort these migraines.  Patient had problems with insurance coverage of large quantities of sumatriptan and presents today with forms that she refilled to get preauthorization for larger quantities.  Patient has not seen neurology in a very long time.    She follows up with pain specialists in different institutions and would like to start following up in hours as driving makes it hard for her to make it to her appointments due to pelvic pain.          Current Outpatient Medications   Medication Sig Dispense Refill    SUMAtriptan Succinate (IMITREX) 100 MG Oral Tab Take 1 tablet (100 mg total) by mouth every 2 (two) hours as needed for Migraine. Use at onset; repeat once after 2 HRS-ONLY 2 doses of any sumatriptan (nasal, oral, or injectable) IN 24 HR MAX. 30 tablet 3    SUMAtriptan 20 MG/ACT Nasal Solution 1 spray by Nasal route every 2 (two) hours as needed for Migraine. 1 each 1    LORazepam 1 MG Oral Tab Take 1 tablet (1 mg total) by mouth 3 (three) times daily. 90 tablet 0    azelastine 0.1 % Nasal Solution 2 sprays by Nasal route 2 (two) times daily. 90 mL 1    ipratropium 0.06 % Nasal Solution 2 sprays by Nasal route 4 (four) times daily. 42 mL 3    Estradiol-Norethindrone Acet 0.5-0.1 MG Oral Tab Take 1 tablet by mouth daily. 84 tablet 3    valACYclovir 500 MG Oral Tab       guaiFENesin-Codeine 100-10 MG/5ML Oral Syrup       mometasone furoate 50 MCG/ACT Nasal Suspension 1 spray by Nasal route daily. 51 g 2    betamethasone dipropionate 0.05 % External Lotion Apply 2 mL topically  daily. To scalp 180 mL 0    famotidine 20 MG Oral Tab Take 1 tablet (20 mg total) by mouth nightly. 90 tablet 1    levothyroxine 150 MCG Oral Tab Take 1 tablet (150 mcg total) by mouth before breakfast. 90 tablet 3    albuterol (5 MG/ML) 0.5% Inhalation Nebu Soln Take 0.5 mL (2.5 mg total) by nebulization.      benzonatate 100 MG Oral Cap Take 1 capsule (100 mg total) by mouth.      fluticasone furoate-vilanterol (BREO ELLIPTA) 100-25 MCG/ACT Inhalation Aerosol Powder, Breath Activated Inhale 1 puff into the lungs.      predniSONE 10mg 1 tablet.      Pseudoephedrine HCl (SUDAFED OR) Take by mouth.      Ibuprofen (MOTRIN OR) Take 200 mg by mouth.      levocetirizine 5 MG Oral Tab Take 1 tablet (5 mg total) by mouth every evening. 90 tablet 3    Azelaic Acid (FINACEA) 15 % External Gel Apply topically daily. 1 each 0    SUMAtriptan (SUMATRIPTAN SUCCINATE) 6 MG/0.5ML Subcutaneous Solution Inject 0.5 mL (6 mg total) into the skin daily as needed. 4 mL 0    Syringe, Disposable, (EASY GLIDE LUER LOCK SYRINGE) 1 ML Does not apply Misc Use with sumatriptan daily prn. 25 each 0    Needle, Disp, 26G X 1/2\" Does not apply Misc Use with Imitrex injection. 25 each 0    Syringe, Disposable, 1 ML Does not apply Misc Use with sumatriptan injectable solution 2 each 1    ondansetron 4 MG Oral Tablet Dispersible Take 1 tablet (4 mg total) by mouth every 8 (eight) hours as needed for Nausea. 20 tablet 0    cholecalciferol 50 MCG (2000 UT) Oral Cap Take 1 capsule (2,000 Units total) by mouth daily. Take 2 tablets daily 90 capsule 0    Fluocinonide 0.05 % External Solution Apply 1 Application. topically daily. 60 mL 0    cyclobenzaprine 10 MG Oral Tab Take 1 tablet (10 mg total) by mouth nightly as needed for Muscle spasms. 20 tablet 1    Clobetasol Propionate 0.05 % External Foam APPLY 1 GRAM TOPICALLY AS DIRECTED 50 g 0    Fluocinonide 0.05 % External Solution Apply 1 Application  topically daily. 60 mL 0      Past Medical History:    Diagnosis Date    Arthritis     Autoimmune disorder ([facility])     Lupus    Cervical disc displacement     C4-C5, C5-C6 disc displacement    Chronic pain     Right foot after 2020 foot surgery    Difficulty urinating     Hiatal hernia with gastroesophageal reflux     Hypothyroidism     Infertility     Interstitial cystitis     Migraine     Peripheral neuropathy     Reactive depression (situational)     when daughter     Scalp psoriasis     SI (sacroiliac) joint dysfunction     Sjogren's disease ([facility])     Trigeminal neuralgia pain     Vaginal stricture     Vertigo       Past Surgical History:   Procedure Laterality Date    ARTHROSCOPY, SHOULDER, SURGICAL; W/ROTATOR CUFF REPAIR Right     subclavian reconstruction      BIOPSY OF SKIN LESION  2020    CATARACT EXTRACTION Bilateral     COLONOSCOPY  2012    CORRECT BUNION,SIMPLE Right     EGD  2012    FOOT FRACTURE SURGERY Right     OTHER SURGICAL HISTORY      heel: sural nerve neuroma excision -- achilles tendon accidentally injured during surgery      Social History:  Social History     Socioeconomic History    Marital status: Single    Number of children: 0   Occupational History    Occupation: psychiatrist     Comment: retired   Tobacco Use    Smoking status: Never    Smokeless tobacco: Never   Vaping Use    Vaping Use: Never used   Substance and Sexual Activity    Alcohol use: Not Currently     Comment: rare    Drug use: Not Currently    Sexual activity: Not Currently   Other Topics Concern    Caffeine Concern Yes     Comment: tea, 1 cup      Family History   Problem Relation Age of Onset    Dementia Father         dementia induced by MRSA sepsis (cause of death)    Infectious Disease Father         MRSA sepsis (cause of death)    Breast Cancer Maternal Aunt 65    Hypertension Mother     Obesity Mother     Fibromyalgia Sister     Alcohol and Other Disorders Associated Brother         alcoholism    Depression Other         family h/o     Other (drug use) Brother       Allergies   Allergen Reactions    Gabapentin OTHER (SEE COMMENTS)     Upper and lower extremity ataxia and cognitive impairment     Ropinirole OTHER (SEE COMMENTS)     Vertigo     Naproxen OTHER (SEE COMMENTS)    Sulfa Antibiotics HIVES    Trimethoprim HIVES    Biaxin [Clarithromycin] ITCHING    Sulfamethoxazole W/Trimethoprim ITCHING        REVIEW OF SYSTEMS:   Review of Systems   Review of Systems   Constitutional: Negative for activity change, appetite change and fever.   HENT: Negative for congestion and voice change.    Respiratory: Negative for cough and shortness of breath.    Cardiovascular: Negative for chest pain.   Gastrointestinal: Negative for abdominal distention, abdominal pain and vomiting.   Genitourinary: Negative for hematuria.   Skin: Negative for wound.   Psychiatric/Behavioral: Negative for behavioral problems.   Wt Readings from Last 5 Encounters:   01/22/24 140 lb (63.5 kg)   09/15/23 140 lb 3.2 oz (63.6 kg)   06/06/23 140 lb (63.5 kg)   05/30/23 137 lb (62.1 kg)   02/14/23 141 lb (64 kg)     Body mass index is 24.03 kg/m².      EXAM:   /77   Pulse 83   Ht 5' 4\" (1.626 m)   Wt 140 lb (63.5 kg)   SpO2 97%   BMI 24.03 kg/m²   Physical Exam   Constitutional:       Appearance: Normal appearance.   HENT:      Head: Normocephalic.   Eyes:      Conjunctiva/sclera: Conjunctivae normal.   Cardiovascular:      Rate and Rhythm: Normal rate   Pulmonary:      Effort: Pulmonary effort is normal.   Musculoskeletal:      Cervical back: Neck supple.      Right lower leg: No edema.      Left lower leg: No edema.   Skin:     General: Skin is warm and dry.   Neurological:      General: No focal deficit present.      Mental Status: He is alert and oriented to person, place, and time. Mental status is at baseline.   Psychiatric:         Mood and Affect: Mood normal.         Behavior: Behavior normal.       Health Maintenence:     Health Maintenance Topics with due  status: Overdue       Topic Date Due    COVID-19 Vaccine 09/01/2023    Mammogram 09/21/2023    Influenza Vaccine 10/01/2023    Zoster Vaccines 10/29/2023    Annual Depression Screening 01/01/2024    Fall Risk Screening (Annual) 01/01/2024     Health Maintenance Topics with due status: Due Soon       Topic Date Due    Pneumococcal Vaccine: 65+ Years 12/30/2024              ASSESSMENT AND PLAN:   1. Migraine without aura and with status migrainosus, not intractable  -Discussed with the patient that daily sumatriptan is not a guideline directed medical treatment and that if she gets this many migraines she should be on prophylactic treatment.  I offered starting her on topiramate but she declined saying that she does not do well on this class of medications  -I instructed the patient to follow-up with neurology to explore other options for prophylactic treatments.  I informed her that I will be more than happy to give her daily sumatriptan if that is what the neurologist deems to be the appropriate treatment for her but she needs to see them first.  Orders:  - Neuro Referral - In Network  - SUMAtriptan Succinate (IMITREX) 100 MG Oral Tab; Take 1 tablet (100 mg total) by mouth every 2 (two) hours as needed for Migraine. Use at onset; repeat once after 2 HRS-ONLY 2 doses of any sumatriptan (nasal, oral, or injectable) IN 24 HR MAX.  Dispense: 30 tablet; Refill: 3    2. Chronic migraine w/o aura w/o status migrainosus, not intractable  -Patient reports that sometimes she is unable to get the oral sumatriptan and thus requests nasal spray as a backup  Orders:  - SUMAtriptan 20 MG/ACT Nasal Solution; 1 spray by Nasal route every 2 (two) hours as needed for Migraine.  Dispense: 1 each; Refill: 1    3. Cervical radiculopathy  - [facility] Central Referral for Acute Symptoms (Spine Center)          The patient indicates understanding of these issues and agrees to the plan.  No follow-ups on file.        This  note was prepared using Dragon Medical voice recognition dictation software. As a result errors may occur. When identified these errors have been corrected. While every attempt is made to correct errors during dictation discrepancies may still exist.    Alessandro Rodríguez MD

## 2024-01-22 NOTE — TELEPHONE ENCOUNTER
From: Debbie Peterson  To: Joditheodoraerin Rodríguez  Sent: 1/21/2024 1:26 PM CST  Subject: Need longer appt.    There is a lot to do:  -fill out medicare forms for sumatriptan Rx authorizations  -discuss newer migraine medicines  -discuss bone/cartilage pain in face  -discuss abnormal heart beats.  -L arm radiculopathy with jaw claudication, hand tremor, weakness, elbow pain and ? voice changes.  Does your schedule allow for a little extra time?  Thank you,  DIANE Peterson

## 2024-01-22 NOTE — TELEPHONE ENCOUNTER
Dr Rodríguez, please advise on how much time you need for this patient and her concerns shown in list?

## 2024-02-01 ENCOUNTER — OFFICE VISIT (OUTPATIENT)
Dept: PHYSICAL MEDICINE AND REHAB | Facility: CLINIC | Age: 69
End: 2024-02-01
Payer: MEDICARE

## 2024-02-01 VITALS — HEIGHT: 64 IN | WEIGHT: 140 LBS | BODY MASS INDEX: 23.9 KG/M2

## 2024-02-01 DIAGNOSIS — M32.9 LUPUS (HCC): Primary | ICD-10-CM

## 2024-02-01 DIAGNOSIS — M54.41 CHRONIC RIGHT-SIDED LOW BACK PAIN WITH RIGHT-SIDED SCIATICA: ICD-10-CM

## 2024-02-01 DIAGNOSIS — G89.29 CHRONIC RIGHT-SIDED LOW BACK PAIN WITH RIGHT-SIDED SCIATICA: ICD-10-CM

## 2024-02-01 DIAGNOSIS — M54.16 LUMBAR RADICULOPATHY: ICD-10-CM

## 2024-02-01 DIAGNOSIS — M35.00 SJOGREN'S SYNDROME, WITH UNSPECIFIED ORGAN INVOLVEMENT (HCC): ICD-10-CM

## 2024-02-01 DIAGNOSIS — M50.90 CERVICAL DISC DISEASE: ICD-10-CM

## 2024-02-01 DIAGNOSIS — R10.2 PELVIC PAIN: ICD-10-CM

## 2024-02-01 DIAGNOSIS — M54.12 CERVICAL RADICULOPATHY: ICD-10-CM

## 2024-02-01 PROCEDURE — 99205 OFFICE O/P NEW HI 60 MIN: CPT | Performed by: PHYSICAL MEDICINE & REHABILITATION

## 2024-02-01 NOTE — PROGRESS NOTES
Low Back Pain H & P    Chief Complaint:    Chief Complaint   Patient presents with    Low Back Pain     New left handed patient c/o right side low back pain radiating into her right glute, does not shoot down her leg. Pain has been present for 5 years, denies injury however [place] involved a lot of lifting and thinks this may have contributed. C/o N/T in BL feet and ankles. Also c/o burning pain in BL hands. Has done PT and steroid injections in her hips which provided temporary relief. Had L5-S1 RFA 11/2023 without relief. Has pelvis ultrasound 3/17/23, R hip and lumbar MRI 9/7/23       HPI:  Debbie Peterson is a 68 year old year old left handed female with a prior history of right buttock, right groin, and right lower quadrant pain, and ischial tuberosity pain.  She awoke with this pain after she moved into her [facility] from [place] in 2018.  She did pelvic floor PT and she noted to have trigger points.  This helped some.  She is a retired psychiatrist.  Her new PCP referred her to an OB/GYN and the work up for cancer was negative.  She was diagnosed with piriformis syndrome and was referred to a pain doctor who did piriformis injections which would give her about 30%.  She was in PT at the same time.  She was able to walk one mile a day.  She was told that the right leg was spastic and it would give out on her when she was walking.  She had difficulty with inclines and steps.  She was told that she needed to have a SIJ fusion.  She saw Dr. Tilley at [facility] who agreed with piriformis syndrome and she performed piriformis injections and SIJ injections.  She than spoke to her about SIJ RFN's and possible fusion.   She tried dry needling which did not help and it was very painful.  She went to [facility] and saw a spine doctor who did not recommend a fusion.  She had a history of Sjogren and SLE.  She was referred to a rheumatologist.  She has been off of methotrexate since she moved  back since the first rheumatologist that she saw took her off of the methotrexate.  She will see the [facility] rheumatologist next week.  She saw a pain physician there who recommended SCS.  She did have a fluoroscopically guided ischial tuberosity bursal injection which helped her enough that she was able to sit longer.  She had the right SIJ RFN 9 weeks ago which has helped her some. She did have increased pain 5 weeks after having the RFN's and saw Dr. Tilley who steroid injections into the right SIJ, GTB, and ischial tuberosity along with a Medrol Dosepak.  She was also placed back on the methotrexate.  She is not sure if this is helping her yet.  She has not had the TIRSO checked in a long time.  She has had pelvic MRI scans which have not shown any fractures.    Description of the Pain  The pain is located in the right low back.    The pain radiates to the right buttock, right groin, right lateral hip, and right posterior thigh. She has right pelvic floor pain.  The pain at its best is 3/10. The pain at its worst is 9/10. The pain is currently  8/10.  The pain is described as a(n) aching and sharp sensation.  The buttock pain is sharp and and the groin and pelvis is aching.  The pain is worse when bending, sitting, in the afternoon, in the evening, and driving .    The pain is better walking and in the morning.  There is no numbness.  There is tingling in the bilateral anterior lower leg and bilateral top of the foot.  This is also a burning.  She also has burning in the hands.  There is not weakness in the right leg.  She has left arm weakness.    She has cervical spine arthritis.  She has left arm tremors.  Her jaw will chatter after she chews and drinks.    Past Medical History   Past Medical History:   Diagnosis Date    Arthritis     Autoimmune disorder (HCC) 2003    Lupus    Cervical disc displacement     C4-C5, C5-C6 disc displacement    Chronic pain     Right foot after 2020 foot surgery    Depression      Daughter  (adopted)    Difficulty urinating     Hiatal hernia with gastroesophageal reflux     Hypothyroidism     Infertility     Interstitial cystitis     Migraine     Migraines     Peripheral neuropathy     Reactive depression (situational)     when daughter     Scalp psoriasis     SI (sacroiliac) joint dysfunction     Sjogren's disease (HCC)     Traumatic brain injury (HCC) concussion after fall     Tremor L hand tremor and mandibular claudication or dystonia.    Trigeminal neuralgia pain     Vaginal stricture     Vertigo        Past Surgical History   Past Surgical History:   Procedure Laterality Date    ARTHROSCOPY, SHOULDER, SURGICAL; W/ROTATOR CUFF REPAIR Right     subclavian reconstruction      BIOPSY OF SKIN LESION  2020    CATARACT EXTRACTION Bilateral     COLONOSCOPY  2012    CORRECT BUNION,SIMPLE Right     EGD  2012    FOOT FRACTURE SURGERY Right     OTHER SURGICAL HISTORY      heel: sural nerve neuroma excision -- achilles tendon accidentally injured during surgery       Family History   Family History   Problem Relation Age of Onset    Dementia Father         dementia induced by MRSA sepsis (cause of death)    Infectious Disease Father         MRSA sepsis (cause of death)    Breast Cancer Maternal Aunt 65    Hypertension Mother     Obesity Mother     Depression Mother     Fibromyalgia Sister     Alcohol and Other Disorders Associated Brother         alcoholism    Depression Brother     Depression Other         family h/o    Other (drug use) Brother        Social History   Social History     Socioeconomic History    Marital status: Single     Spouse name: Not on file    Number of children: 0    Years of education: Not on file    Highest education level: Not on file   Occupational History    Occupation: psychiatrist     Comment: retired   Tobacco Use    Smoking status: Never    Smokeless tobacco: Never    Tobacco comments:     Never even tried   Vaping  Use    Vaping Use: Never used   Substance and Sexual Activity    Alcohol use: Not Currently     Comment: rare    Drug use: Never    Sexual activity: Not Currently   Other Topics Concern     Service Not Asked    Blood Transfusions Not Asked    Caffeine Concern Yes     Comment: tea, 1 cup    Occupational Exposure Not Asked    Hobby Hazards Not Asked    Sleep Concern Not Asked    Stress Concern Not Asked    Weight Concern Not Asked    Special Diet Not Asked    Back Care Not Asked    Exercise Not Asked    Bike Helmet Not Asked    Seat Belt Not Asked    Self-Exams Not Asked   Social History Narrative    Not on file     Social Determinants of Health     Financial Resource Strain: Not on file   Food Insecurity: Not on file   Transportation Needs: Not on file   Physical Activity: Not on file   Stress: Not on file   Social Connections: Not on file   Housing Stability: Not on file       Review of Systems  Patient-reported ROS  Constitutional  Sleep Disturbance: admits  Chills: denies  Fever: denies  Weight Gain: denies  Weight Loss: denies   Cardiovascular  Chest Pain: denies  Irregular Heartbeat: denies   Respiratory  Painful Breathing: denies  Wheezing: denies   Gastrointestinal  Bowel Incontinence: denies  Heartburn: denies  Abdominal Pain: denies  Blood in Stool : denies  Rectal Pain: denies   Hematology  Easy Bruising: denies  Easy Bleeding: denies   Genitourinary  Difficulty Urinating: denies  Bladder Incontinence: denies  Pelvic Pain: denies  Painful Urination: denies   Musculoskeletal  Joint Stiffness: admits  Painful Joints: admits  Tailbone Pain: denies  Swollen Joints: denies   Peripheral Vascular  Swelling of Legs/Feet: denies  Cold Extremities: denies   Skin  Open Sores: denies  Nodules or Lumps: denies  Rash: denies   Neurological  Loss of Strength Since last Visit: admits  Tingling/Numbness: admits  Balance: denies   Psychiatric  Anxiety: admits  Depressed Mood: denies        PE:  The patient does  appear in her stated age in no distress.  The patient is well groomed.    Psychiatric:  The patient is alert and oriented x 3.  The patient has a normal affect and mood.      Respiratory:  No acute respiratory distress. Patient does not have a cough.    HEENT:  Extraocular muscles are intact. There is no kern icterus. Pupils are equal, round, and reactive to light. No redness or discharge bilaterally.    Skin:  There are no rashes or lesions.    Vitals:  There were no vitals filed for this visit.    Gait:    Gait: Normal gait   Sit to Stand: no difficulty   RIGHT Walking on Toes: no difficulty   LEFT Walking on Toes: no difficulty   RIGHT Walking on Heels: no difficulty   LEFT Walking on Heels: no difficulty     Cervical Spine:    Posture: mild-moderate chin forward superiorly rotated protracted shoulder posture.   Shoulders: Level   Head: In neutral   Spinous Processes Palpations: Tender at C5, C6, and C7   Z-Joints Palpations: Tender at  left > right C4-5, left > right C5-6, and left > right C6-7   Muscular Palpations: Tender at  right rhomboid     Lumbar Spine:    Scoliosis: No scoliosis present   Lumbar Flexion: 60 degrees Gives the patient pain in the bilateral calves.   Lumbar Extension: 20 degrees Gives the patient pain in the right low back.     Lumbar Spine Palpation:    Spinous Processes: Tender at L5 and S1   Z-joints: Tender at  right L5-S1   SIJ: Tender at right > left SIJ   Piriformis Muscle: Tender at right Piriformis muscle(s)   Greater Trochanteric Bursa: Tender at right Greater Trochanteric Bursa(s)     Vascular upper extremity:   Right radial pulses: 2+   Left radial pulses: 2+     Vascular lower extremity:   Dorsalis pedis pulse-RIGHT 2+   Dorsalis pedis pulse-LEFT 2+   Tibialis posterior pulse-RIGHT 2+   Tibialis posterior pulse-LEFT 2+      Neurological Upper Extremity:    Light Touch: Intact in Bilateral upper extremities.   Pin Prick: Not tested.   UE Muscle Strength: All Upper Extremity  strength measurements 5/5 except:  Triceps Left: 4-/5  Serratus anterior Left: 4/5   Reflexes: 2+ In the bilateral upper extremities.   Escoto's sign Right: Negative   Escoto's sigh Left: Negative     Neurological Lower Extremity:    Light Touch Sensation: Intact in bilateral Lower Extremities   LE Muscle Strength: All LE strength measurements 5/5 except:  Hamstring RIGHT:   4/5  Hip Flexion RIGHT:  4+/5   RIGHT plantar reflexes: downward response   LEFT plantar reflexes: downward response   Reflexes: 2+ in bilateral lower extremities     Hip: Hips are stable.    RIGHT hip ROM normal   LEFT hip ROM normal   RIGHT hip flexion Positive right groin pain   LEFT hip flexion Positive right groin pain   RIGHT hip RUIZ test Positive for right groin pain   LEFT hip RUIZ test Positive for left groin pain   RIGHT hip internal rotation Positive for right groin pain and right buttock pain   LEFT hip internal rotation Negative for pain   RIGHT hip piriformis stretch test Positive for right groin pain and right buttock pain   LEFT hip piriformis stretch test Negative for pain     Neural Tension Tests Lumbar Spine:  Sitting straight leg raise-RIGHT Positive for right posterior leg pain   Sitting straight leg raise-LEFT Positive for left posterior leg pain   Supine straight leg raise-RIGHT Positive for right posterior leg pain and at 40 degrees   Supine straight leg raise-LEFT Positive for left posterior leg pain and at 60 degrees   Slump test-RIGHT Negative for pain   Slump test-LEFT Negative for pain     Lymph Nodes:   Inguinal Lymph Nodes Absent     Special Tests Lumbar Spine:  Lying prone, Prone on elbows, and Prone press up does not affect the patient's symptoms.      Radiology Imaging:  I reviewed with the patient her MRI of the cervical spine, hip right, and sacrum  from 8/29/2023, 9/7/2023, and 5/29/2021.      Assessment  1. Lupus (HCC)    2. Sjogren's syndrome, with unspecified organ involvement (HCC)    3. right L3-4  and L5-S1 radiculopathy    4. Cervical disc disease: C6-7    5. left C7 radiculopathy    6. Chronic right-sided low back pain with right-sided sciatica    7. Pelvic pain      Plan  She will get a MRI of the cervical and lumbar spines.    She will get lumbar spine flexion and extension x-rays.    She will follow up after having the above.    The total office visit face-to-face visit time was at least 60 minutes with over half of the time spent discussing patient care and treatment.    The patient understands and agrees with the stated plan.    Horacio Sadler MD  2/1/2024

## 2024-02-02 ENCOUNTER — PATIENT MESSAGE (OUTPATIENT)
Dept: OTHER | Age: 69
End: 2024-02-02

## 2024-02-02 NOTE — PATIENT INSTRUCTIONS
Plan  She will get a MRI of the cervical and lumbar spines.    She will get lumbar spine flexion and extension x-rays.    She will follow up after having the above.

## 2024-02-04 NOTE — TELEPHONE ENCOUNTER
From: Debbie Peterson  Sent: 2/3/2024 1:50 AM CST  To: Christel Fried CMA  Subject: Medication list    This is an error. I continue to use fluocinonide and betamethasone lotions for my severe scalp and nail psoariasis.  Thanks for correcting this issue.  Debbie HENRIQUEZ

## 2024-02-05 ENCOUNTER — HOSPITAL ENCOUNTER (OUTPATIENT)
Dept: GENERAL RADIOLOGY | Facility: HOSPITAL | Age: 69
Discharge: HOME | End: 2024-02-05
Attending: PHYSICAL MEDICINE & REHABILITATION
Payer: MEDICARE

## 2024-02-05 DIAGNOSIS — M54.16 LUMBAR RADICULOPATHY: ICD-10-CM

## 2024-02-05 PROCEDURE — 72110 X-RAY EXAM L-2 SPINE 4/>VWS: CPT | Performed by: PHYSICAL MEDICINE & REHABILITATION

## 2024-02-07 ENCOUNTER — TELEPHONE (OUTPATIENT)
Dept: PHYSICAL MEDICINE AND REHAB | Facility: CLINIC | Age: 69
End: 2024-02-07

## 2024-02-07 ENCOUNTER — MED REC SCAN ONLY (OUTPATIENT)
Dept: PHYSICAL MEDICINE AND REHAB | Facility: CLINIC | Age: 69
End: 2024-02-07

## 2024-02-07 NOTE — TELEPHONE ENCOUNTER
Patient called to seek recommendation of where to have MRI performed outside of the organization as next available is 6 weeks out, patient states she is unsure with symptoms worsening that 6 weeks may be too long to wait on the MRI. Patient is reporting that she is experiencing neck and shoulder on the L side that is worsening scored at 4/10 if not moving and 5 or 6 when moving her arm, weakness and tremors of L hand, also weakness and tremors of the jaw. Patient states she is struggling to use a fork, to eat as well as speaking. Please call back at [phone] and advise.

## 2024-02-09 ENCOUNTER — PATIENT MESSAGE (OUTPATIENT)
Dept: INTERNAL MEDICINE CLINIC | Facility: CLINIC | Age: 69
End: 2024-02-09

## 2024-02-09 DIAGNOSIS — M25.50 PAIN IN JOINT, MULTIPLE SITES: ICD-10-CM

## 2024-02-09 RX ORDER — LORAZEPAM 1 MG/1
1 TABLET ORAL 3 TIMES DAILY
Qty: 90 TABLET | Refills: 0 | Status: SHIPPED | OUTPATIENT
Start: 2024-02-09

## 2024-02-09 NOTE — TELEPHONE ENCOUNTER
LORazepam     Dispensed Written Strength Quantity Refills Days Supply Provider Pharmacy   LORAZEPAM 01/12/2024 01/12/2024 1 mg 90  30 THADDEUS CUETO

## 2024-02-09 NOTE — TELEPHONE ENCOUNTER
Patient calling to request refill, stated is running low, verified pharmacy [facility].     LORazepam 1 MG Oral Tab               Summary: Take 1 tablet (1 mg total) by mouth 3 (three) times daily., Normal, Disp-90 tablet, R-0  Guidelines: Dose, Route, Frequency: 1 mg, Oral, 3 times dailyStart: 01/12/2024Ord/Sold: 01/12/2024 (O)Ordered On: 01/12/2024

## 2024-02-09 NOTE — TELEPHONE ENCOUNTER
Please review; protocol failed/No Protocol    Fill Dates:11/21/2023, 01/12/2024    Requested Prescriptions   Pending Prescriptions Disp Refills    LORazepam 1 MG Oral Tab 90 tablet 0     Sig: Take 1 tablet (1 mg total) by mouth 3 (three) times daily.       Controlled Substance Medication Failed - 2/9/2024 11:56 AM        Failed - This medication is a controlled substance - forward to provider to refill           Future Appointments         Provider Department Appt Notes    In 1 month [facility] MRI 1 (1.5T) [facility]     In 1 month [facility] MRI 1 (1.5T) [facility]           Recent Outpatient Visits              1 week ago [facility]    Office Visit    2 weeks ago Migraine without aura and with status migrainosus, not intractable    [facility]    [facility]    1 month ago Chronic migraine w/o aura w/o status migrainosus, not intractable    [facility] [place] [facility]    Telemedicine    4 months ago Encounter for screening mammogram for malignant neoplasm of breast    [facility]    Office Visit    8 months ago Bilateral leg pain    [facility]    Office Visit

## 2024-02-09 NOTE — TELEPHONE ENCOUNTER
From: Debbie Peterson  To: Jodipiyush Anne  Sent: 2/9/2024 9:43 AM CST  Subject: Refill    The new system is not responding to request for refills.  Please order:Lorazepam 1.0 mg #90.  Thank you. МАРИЯ

## 2024-02-12 NOTE — TELEPHONE ENCOUNTER
CD with CT of Pelvis (completed on 01/23/2024) unable to be uploaded to be PACS at this time. Placed in Dr. Sadler's folder for his review/recommendations.

## 2024-02-16 ENCOUNTER — TELEPHONE (OUTPATIENT)
Dept: PHYSICAL MEDICINE AND REHAB | Facility: CLINIC | Age: 69
End: 2024-02-16

## 2024-02-16 PROBLEM — M47.818 ARTHRITIS OF SACROILIAC JOINT: Status: ACTIVE | Noted: 2024-02-16

## 2024-02-16 PROBLEM — M46.1 ARTHRITIS OF SACROILIAC JOINT (HCC): Status: ACTIVE | Noted: 2024-02-16

## 2024-02-16 PROBLEM — M16.0 PRIMARY OSTEOARTHRITIS OF BOTH HIPS: Status: ACTIVE | Noted: 2024-02-16

## 2024-02-16 PROBLEM — M46.1 ARTHRITIS OF SACROILIAC JOINT: Status: ACTIVE | Noted: 2024-02-16

## 2024-02-16 PROBLEM — M51.9 LUMBAR DISC DISEASE: Status: ACTIVE | Noted: 2024-02-16

## 2024-02-16 PROBLEM — M47.818 ARTHRITIS OF SACROILIAC JOINT (HCC): Status: ACTIVE | Noted: 2024-02-16

## 2024-02-16 NOTE — TELEPHONE ENCOUNTER
I reviewed the CT scan of the pelvis which showed mild bulging of the L5-S1 disc on the right side and mild OA of the bilateral SIJ and hips.  I will await the MRI scans as ordered.

## 2024-02-17 ENCOUNTER — PATIENT MESSAGE (OUTPATIENT)
Dept: PHYSICAL MEDICINE AND REHAB | Facility: CLINIC | Age: 69
End: 2024-02-17

## 2024-02-17 DIAGNOSIS — G57.81: Primary | ICD-10-CM

## 2024-02-19 NOTE — TELEPHONE ENCOUNTER
Spoke with pt on the phone as she called nurse triage. Please see TE from 2/16/24.    functional deficits after spinal cord infarction functional deficits after spinal cord infarction functional deficits after spinal cord infarction functional deficits after spinal cord infarction functional deficits after spinal cord infarction functional deficits after spinal cord infarction functional deficits after spinal cord infarction functional deficits after spinal cord infarction functional deficits after spinal cord infarction functional deficits after spinal cord infarction functional deficits after spinal cord infarction functional deficits after spinal cord infarction functional deficits after spinal cord infarction functional deficits after spinal cord infarction functional deficits after spinal cord infarction functional deficits after spinal cord infarction functional deficits after spinal cord infarction functional deficits after spinal cord infarction functional deficits after spinal cord infarction functional deficits after spinal cord infarction functional deficits after spinal cord infarction functional deficits after spinal cord infarction functional deficits after spinal cord infarction functional deficits after spinal cord infarction functional deficits after spinal cord infarction functional deficits after spinal cord infarction

## 2024-02-19 NOTE — TELEPHONE ENCOUNTER
Please advise on patient message request for EMG. Thank you.    Last office visit: 2/1/24  MRI scheduled for 3/19/24.

## 2024-02-19 NOTE — TELEPHONE ENCOUNTER
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 Dr Sesay has ordered an EMG.  The first available is mid-April. I would like to get in sooner. I have been suffering with this for 5 yrs and treated for multiple previous dx's. Would you consider ordering an EMG at [place] ? I could see if they may have a sooner appt.   Thank you, Debbie Peterson

## 2024-02-19 NOTE — TELEPHONE ENCOUNTER
Pt was informed on Dr Sadler's review on the CT scan and she preferred that the disc is mailed back home to her.   Placed the disc with images for mailing.     Pt also requested if Dr Sadler can place an order for Right Leg EMG. Pt stated that her pain doctor at [facility] placed that order but she is not able to find an appointment soon enough and was wondering if Dr Sadler is able to placed it for her so she can have it done with Dr Salder.     Educated patient that she can also request the ordering provider to make an external order for EMG to be done at Dr Sadler. However will ask Dr Sadler on his recommendations. -Please see patient's message on 2/17/2024.

## 2024-02-19 NOTE — TELEPHONE ENCOUNTER
Per Dr Sadler we can place an EMG order for Right lower extremity and schedule the patient for the EMG. Order placed.   Called patient and left a voice mail. There are a few slots available on second week of March.

## 2024-02-21 ENCOUNTER — HOSPITAL ENCOUNTER (EMERGENCY)
Facility: HOSPITAL | Age: 69
Discharge: HOME | End: 2024-02-21
Attending: EMERGENCY MEDICINE
Payer: MEDICARE

## 2024-02-21 ENCOUNTER — APPOINTMENT (OUTPATIENT)
Dept: GENERAL RADIOLOGY | Facility: HOSPITAL | Age: 69
End: 2024-02-21
Attending: EMERGENCY MEDICINE
Payer: MEDICARE

## 2024-02-21 ENCOUNTER — PATIENT MESSAGE (OUTPATIENT)
Dept: PHYSICAL MEDICINE AND REHAB | Facility: CLINIC | Age: 69
End: 2024-02-21

## 2024-02-21 VITALS
HEART RATE: 69 BPM | RESPIRATION RATE: 18 BRPM | SYSTOLIC BLOOD PRESSURE: 116 MMHG | DIASTOLIC BLOOD PRESSURE: 78 MMHG | OXYGEN SATURATION: 98 % | TEMPERATURE: 98 F

## 2024-02-21 DIAGNOSIS — R00.2 PALPITATIONS: Primary | ICD-10-CM

## 2024-02-21 LAB
ALBUMIN SERPL-MCNC: 4.3 G/DL (ref 3.2–4.8)
ALBUMIN/GLOB SERPL: 1.7 {RATIO} (ref 1–2)
ALP LIVER SERPL-CCNC: 60 U/L
ALT SERPL-CCNC: 14 U/L
ANION GAP SERPL CALC-SCNC: 5 MMOL/L (ref 0–18)
AST SERPL-CCNC: 16 U/L (ref ?–34)
ATRIAL RATE: 89 BPM
BASOPHILS # BLD AUTO: 0.03 X10(3) UL (ref 0–0.2)
BASOPHILS NFR BLD AUTO: 0.5 %
BILIRUB SERPL-MCNC: 0.5 MG/DL (ref 0.2–1.1)
BUN BLD-MCNC: 25 MG/DL (ref 9–23)
BUN/CREAT SERPL: 31.3 (ref 10–20)
CALCIUM BLD-MCNC: 9.3 MG/DL (ref 8.7–10.4)
CHLORIDE SERPL-SCNC: 107 MMOL/L (ref 98–112)
CO2 SERPL-SCNC: 27 MMOL/L (ref 21–32)
CREAT BLD-MCNC: 0.8 MG/DL
DEPRECATED RDW RBC AUTO: 46.8 FL (ref 35.1–46.3)
EGFRCR SERPLBLD CKD-EPI 2021: 81 ML/MIN/1.73M2 (ref 60–?)
EOSINOPHIL # BLD AUTO: 0.02 X10(3) UL (ref 0–0.7)
EOSINOPHIL NFR BLD AUTO: 0.3 %
ERYTHROCYTE [DISTWIDTH] IN BLOOD BY AUTOMATED COUNT: 13.5 % (ref 11–15)
GLOBULIN PLAS-MCNC: 2.5 G/DL (ref 2.8–4.4)
GLUCOSE BLD-MCNC: 87 MG/DL (ref 70–99)
HCT VFR BLD AUTO: 40.6 %
HGB BLD-MCNC: 13.8 G/DL
IMM GRANULOCYTES # BLD AUTO: 0.02 X10(3) UL (ref 0–1)
IMM GRANULOCYTES NFR BLD: 0.3 %
LYMPHOCYTES # BLD AUTO: 1.74 X10(3) UL (ref 1–4)
LYMPHOCYTES NFR BLD AUTO: 28.7 %
MAGNESIUM SERPL-MCNC: 2.1 MG/DL (ref 1.6–2.6)
MCH RBC QN AUTO: 32.5 PG (ref 26–34)
MCHC RBC AUTO-ENTMCNC: 34 G/DL (ref 31–37)
MCV RBC AUTO: 95.5 FL
MONOCYTES # BLD AUTO: 0.42 X10(3) UL (ref 0.1–1)
MONOCYTES NFR BLD AUTO: 6.9 %
NEUTROPHILS # BLD AUTO: 3.83 X10 (3) UL (ref 1.5–7.7)
NEUTROPHILS # BLD AUTO: 3.83 X10(3) UL (ref 1.5–7.7)
NEUTROPHILS NFR BLD AUTO: 63.3 %
OSMOLALITY SERPL CALC.SUM OF ELEC: 292 MOSM/KG (ref 275–295)
P AXIS: 70 DEGREES
P-R INTERVAL: 168 MS
PLATELET # BLD AUTO: 238 10(3)UL (ref 150–450)
POTASSIUM SERPL-SCNC: 3.6 MMOL/L (ref 3.5–5.1)
PROT SERPL-MCNC: 6.8 G/DL (ref 5.7–8.2)
Q-T INTERVAL: 350 MS
QRS DURATION: 58 MS
QTC CALCULATION (BEZET): 425 MS
R AXIS: 38 DEGREES
RBC # BLD AUTO: 4.25 X10(6)UL
SODIUM SERPL-SCNC: 139 MMOL/L (ref 136–145)
T AXIS: 27 DEGREES
TROPONIN I SERPL HS-MCNC: <3 NG/L
VENTRICULAR RATE: 89 BPM
WBC # BLD AUTO: 6.1 X10(3) UL (ref 4–11)

## 2024-02-21 PROCEDURE — 84484 ASSAY OF TROPONIN QUANT: CPT | Performed by: EMERGENCY MEDICINE

## 2024-02-21 PROCEDURE — 99285 EMERGENCY DEPT VISIT HI MDM: CPT

## 2024-02-21 PROCEDURE — 71045 X-RAY EXAM CHEST 1 VIEW: CPT | Performed by: EMERGENCY MEDICINE

## 2024-02-21 PROCEDURE — 83735 ASSAY OF MAGNESIUM: CPT | Performed by: EMERGENCY MEDICINE

## 2024-02-21 PROCEDURE — 80053 COMPREHEN METABOLIC PANEL: CPT | Performed by: EMERGENCY MEDICINE

## 2024-02-21 PROCEDURE — 93010 ELECTROCARDIOGRAM REPORT: CPT

## 2024-02-21 PROCEDURE — 36415 COLL VENOUS BLD VENIPUNCTURE: CPT

## 2024-02-21 PROCEDURE — 99284 EMERGENCY DEPT VISIT MOD MDM: CPT

## 2024-02-21 PROCEDURE — 93005 ELECTROCARDIOGRAM TRACING: CPT

## 2024-02-21 PROCEDURE — 85025 COMPLETE CBC W/AUTO DIFF WBC: CPT | Performed by: EMERGENCY MEDICINE

## 2024-02-21 NOTE — TELEPHONE ENCOUNTER
From: Debbie Peterson  To: Horacio Sadler  Sent: 2/21/2024 12:19 AM CST  Subject: Cardiac Symptom    Good Evening,   I am having symptoms that may suggest AFib/Aflutter that is getting more frequent in recent days. I also have been having jaw claudication and crawling sensations in my cheeks as well as ankle edema(mild).   Can you recommend a cardiologist that I can consult with?   Thank you, Debbie Peterson

## 2024-02-21 NOTE — ED INITIAL ASSESSMENT (HPI)
Pt reports spontaneous palpitations starting around early December, states they are much more frequent recently with jaw tightness and followed by fatigue. Denies JEREMY

## 2024-02-21 NOTE — TELEPHONE ENCOUNTER
Spoke to patient who reports:    Current fluttery feeling in chest    Jaw claudication worse- after eating  Checked pulse and it was weak    Mild ankle edema       Patient stated, \" As I am talking to you I feel the fluttering in my chest.\"    Denies:  Short of breath  Current pain in chest    Due to above reported symptoms patient was advised to proceed to ER for further evaluation.    Patient agreed and understanding was verbalized.

## 2024-02-21 NOTE — ED PROVIDER NOTES
Patient Seen in: [facility] Emergency Department    History     Chief Complaint   Patient presents with    Arrythmia/Palpitations       HPI    68-year-old female with past medical history significant for lupus, depression, hypothyroidism, migraines, peripheral neuropathy, psoriasis, trigeminal neuralgia, Sjogren's syndrome, presents to the emergency department for evaluation of palpitation sensation.  She states that she has been having episodes since November feeling a fluttering in her chest or palpitations.  In the last several days it seems to be occurring more frequently.  She states she had an episode where she had 7 beats in a row that felt abnormal to her in the last day.  She otherwise is taking her pulse and has had normal pulse rates.  No shortness of breath, chest pain, nausea.    History from Independent Source:       External Records Reviewed:     History reviewed.   Past Medical History:   Diagnosis Date    Arthritis     Autoimmune disorder ([facility])     Lupus    Cervical disc displacement     C4-C5, C5-C6 disc displacement    Chronic pain     Right foot after  foot surgery    Depression     Daughter  (adopted)    Difficulty urinating     Hiatal hernia with gastroesophageal reflux     Hypothyroidism     Infertility     Interstitial cystitis     Migraine     Migraines     Peripheral neuropathy     Reactive depression (situational)     when daughter     Scalp psoriasis     SI (sacroiliac) joint dysfunction     Sjogren's disease ([facility])     Traumatic brain injury ([facility]) concussion after fall     Tremor L hand tremor and mandibular claudication or dystonia.    Trigeminal neuralgia pain     Vaginal stricture     Vertigo        History reviewed.   Past Surgical History:   Procedure Laterality Date    ARTHROSCOPY, SHOULDER, SURGICAL; W/ROTATOR CUFF REPAIR Right     subclavian reconstruction      BIOPSY OF SKIN LESION  2020    CATARACT EXTRACTION Bilateral      COLONOSCOPY  2012    CORRECT BUNION,SIMPLE Right 2004    EGD  2012    FOOT FRACTURE SURGERY Right 2020    OTHER SURGICAL HISTORY  2005    heel: sural nerve neuroma excision -- achilles tendon accidentally injured during surgery         Medications :  (Not in a hospital admission)       Family History   Problem Relation Age of Onset    Dementia Father         dementia induced by MRSA sepsis (cause of death)    Infectious Disease Father         MRSA sepsis (cause of death)    Breast Cancer Maternal Aunt 65    Hypertension Mother     Obesity Mother     Depression Mother     Fibromyalgia Sister     Alcohol and Other Disorders Associated Brother         alcoholism    Depression Brother     Depression Other         family h/o    Other (drug use) Brother        Smoking Status:   Social History     Socioeconomic History    Marital status: Single    Number of children: 0   Occupational History    Occupation: psychiatrist     Comment: retired   Tobacco Use    Smoking status: Never    Smokeless tobacco: Never    Tobacco comments:     Never even tried   Vaping Use    Vaping Use: Never used   Substance and Sexual Activity    Alcohol use: Not Currently     Comment: rare    Drug use: Never    Sexual activity: Not Currently   Other Topics Concern    Caffeine Concern Yes     Comment: tea, 1 cup       Constitutional and vital signs reviewed.      Social History and Family History elements reviewed from today, pertinent positives to the presenting problem noted.    Physical Exam     ED Triage Vitals [02/21/24 1021]   /87   Pulse 92   Resp 16   Temp 97.7 °F (36.5 °C)   Temp src    SpO2 99 %   O2 Device None (Room air)       Physical Exam   Constitutional: AAOx3, well nourished, NAD  HEENT: Normocephalic, PERRLA, MMM  CV: s1s2+, RRR, no m/r/g, normal distal pulses  Pulmonary/Chest: CTA b/l with no rales, wheezes.  No chest wall tenderness  Abdominal: Nontender.  Nondistended. Soft. Bowel sounds are normal.   Neck/Back:   :    Musculoskeletal: Normal range of motion. No deformity.   Neurological: Awake, alert. Normal reflexes. No cranial nerve deficit.    Skin: Skin is warm and dry. No rash noted. No erythema.   Psychiatric:      All measures to prevent infection transmission during my interaction with the patient were taken. The patient was already wearing a droplet mask on my arrival to the room. Personal protective equipment was worn throughout the duration of the exam.      ED Course        Labs Reviewed   COMP METABOLIC PANEL (14) - Abnormal; Notable for the following components:       Result Value    BUN 25 (*)     BUN/CREA Ratio 31.3 (*)     Globulin  2.5 (*)     All other components within normal limits   CBC W/ DIFFERENTIAL - Abnormal; Notable for the following components:    RDW-SD 46.8 (*)     All other components within normal limits   TROPONIN I HIGH SENSITIVITY - Normal   MAGNESIUM - Normal   CBC WITH DIFFERENTIAL WITH PLATELET    Narrative:     The following orders were created for panel order CBC With Differential With Platelet.                  Procedure                               Abnormality         Status                                     ---------                               -----------         ------                                     CBC W/ DIFFERENTIAL[[id number]]          Abnormal            Final result                                                 Please view results for these tests on the individual orders.   RAINBOW DRAW LAVENDER   RAINBOW DRAW LIGHT GREEN   RAINBOW DRAW BLUE     My Independent Interpretation of EKG (if performed): EKG    Rate, intervals and axes as noted on EKG Report.  Rate: 89 bpm  Rhythm: Sinus Rhythm  Reading: Normal sinus rhythm, no acute ST changes, low voltage, normal axis and intervals, no ectopy             Monitor Interpretation:   normal sinus rhythm as interpreted by me.      Imaging Results Available and Reviewed while in ED: XR CHEST AP PORTABLE  (CPT=71045)    Result  Date: 2/21/2024  CONCLUSION:  1. No acute cardiopulmonary disease    Dictated by (CST): Rodriguez Saavedra MD on 2/21/2024 at 11:26 AM     Finalized by (CST): Rodriguez Saavedra MD on 2/21/2024 at 11:29 AM         ED Medications Administered: Medications - No data to display          MDM     Vitals:    02/21/24 1021 02/21/24 1045 02/21/24 1100   BP: 134/87 128/79 125/80   Pulse: 92 85 85   Resp: 16 12 11   Temp: 97.7 °F (36.5 °C)     SpO2: 99% 99% 98%     *I personally reviewed and interpreted all ED vitals.    Independent Interpretation of Studies: I have independently reviewed patient's chest x-ray and there are no remarkable findings    Social Determinants of Health:     Procedures:      Differential/MDM/Shared Decision Making: Differential Diagnosis includes dysrhythmia, hypoglycemia, electrolyte abnormality, dehydration, PVCs, PACs, others.      The patient already has Sjogren's, lupus, depression, hypothyroidism, migraines, peripheral neuropathy, psoriasis, trigeminal neuralgia, to contribute to the complexity of this ED evaluation.           Workup in the ED is relatively unremarkable.  I suspect patient may be having PVCs causing symptoms.  Discussed case with patient and she is comfortable discharge at this time.      Condition upon leaving the department: Stable    Disposition and Plan     Clinical Impression:  1. Palpitations        Disposition:  Discharge    Follow-up:  Alessandro Rodríguez MD  [address] [place] [id number]  [phone]    Call in 2 day(s)        Medications Prescribed:  Current Discharge Medication List

## 2024-02-23 ENCOUNTER — TELEPHONE (OUTPATIENT)
Dept: INTERNAL MEDICINE CLINIC | Facility: CLINIC | Age: 69
End: 2024-02-23

## 2024-02-23 ENCOUNTER — PATIENT OUTREACH (OUTPATIENT)
Dept: CASE MANAGEMENT | Age: 69
End: 2024-02-23

## 2024-02-23 NOTE — TELEPHONE ENCOUNTER
Left message for patient to call office     Will need to cancel appointment 02/27/2024 with Dr Rodríguez per provider request      Please schedule with either of Dr abilio Pritchard

## 2024-02-23 NOTE — PROGRESS NOTES
1st attempt ER f/up apt request    Kisha Rodríguez  PCP  [address] [facility] [address]  [phone]  Apt: Feb 27 @10:40am    Confirmed w/pt  Closing encounter

## 2024-02-26 NOTE — TELEPHONE ENCOUNTER
Patient calling to request if  can provide a list of Neurologists and PCP provider that he can refer to her a call and voicemail is ok to inform.

## 2024-02-26 NOTE — TELEPHONE ENCOUNTER
Message sent to  to see if there are any specific neurologists or PCP's he would recommend for patient.

## 2024-03-12 ENCOUNTER — PROCEDURE VISIT (OUTPATIENT)
Dept: PHYSICAL MEDICINE AND REHAB | Facility: CLINIC | Age: 69
End: 2024-03-12
Payer: MEDICARE

## 2024-03-12 DIAGNOSIS — M54.16 LUMBAR RADICULOPATHY: Primary | ICD-10-CM

## 2024-03-12 PROCEDURE — 95886 MUSC TEST DONE W/N TEST COMP: CPT | Performed by: PHYSICAL MEDICINE & REHABILITATION

## 2024-03-12 PROCEDURE — 95908 NRV CNDJ TST 3-4 STUDIES: CPT | Performed by: PHYSICAL MEDICINE & REHABILITATION

## 2024-03-13 NOTE — PROCEDURES
[address]  [address]  [place] [address]  Phone: [phone]  Fax: [phone]    ELECTRODIAGNOSTIC REPORT          Patient: Debbie Peterson Hand Dominance:  left handed   Patient ID: [id number] Referring Dr:  Dr Santos Sesay at [facility]   Sex: Female Test Dr:  Dr Sadler   YOB: 1955           Visit Date: 68 Years Examining MD:     Age: 68 Years Referred by:     Height: 5 feet 4 inch     Referred for: Right Lower Extremity EMG               Summary    The motor conduction test was normal in all 2 of the tested nerves: R Peroneal - EDB, R Tibial - AH.    The sensory conduction test was performed on 2 nerve(s). The results were normal in 1 nerve(s): R Superficial peroneal - Ankle. Results outside the specified normal range were found in 1 nerve(s), as follows:  In the R Sural - Ankle (Calf) study  the peak latency was increased for Calf stimulation    The needle EMG examination was performed in 7 muscles. It was normal in 5 muscle(s): R. Gastrocnemius (Medial head), R. Peroneus longus, R. Vastus lateralis, R. Adductor josep, R. Adductor brevis. The study was abnormal in 2 muscle(s), with the following distribution:  The MUP waveform abnormality was found in R. Tibialis posterior, R. Tibialis anterior.          Conclusion:  This is an abnormal study.  There is electrodiagnostic evidence of  Right L5 chronic radiculopathy.    Given the prolonged sural SNAP, there could be a sciatic neuropathy as opposed to a L5 radiculopathy, but clinically this would be less likely.  Nerve conduction studies of the left leg would be helpful to better understand the significance of this finding.  There is no peripheral neuropathy.  There is no myopathy.       Horacio Sadler M.D.  Diplomate, American Board of Physical Medicine and Rehabilitation          Motor NCS      Nerve / Sites Muscle Latency Amplitude Amp % Duration Segments Distance Lat Diff Velocity     ms mV %  ms  cm ms m/s   R Peroneal - EDB      Ankle EDB 4.29 6.4 100 5.44 Ankle - EDB 8        Fib head EDB 11.48 4.8 74.2 5.27 Fib head - Ankle 30 7.19 42      Pop fossa EDB 13.02 4.9 76.2 5.42 Pop fossa - Fib head 8 1.54 52   R Tibial - AH      Ankle AH 5.69 7.2 100  Ankle - AH 8        Pop fossa AH 12.63 6.0 83 7.42 Pop fossa - Ankle 36 6.94 52       Sensory NCS      Nerve / Sites Rec. Site Onset Lat Peak Lat NP Amp PP Amp Segments Distance Velocity     ms ms µV µV  cm m/s   R Sural - Ankle (Calf)      Calf Ankle 3.75 4.63 10.5 13.7 Calf - Ankle 14 37   R Superficial peroneal - Ankle      Lat leg Ankle 3.42 3.90 6.0  Lat leg - Ankle 14 41       EMG Summary Table     Spontaneous MUAP Recruitment   Muscle Nerve Roots IA Fib PSW Fasc H.F. Comments Amp Dur. PPP Pattern   R. Tibialis posterior Tibial L4-L5 N None None None None Normal 8-10 N N N   R. Gastrocnemius (Medial head) Tibial S1-S2 N None None None None Normal N N N N   R. Tibialis anterior Deep peroneal (Fibular) L4-L5 N None None None None Normal 5-7 N N N   R. Peroneus longus Peroneal L5-S1 N None None None None Normal N N N N   R. Vastus lateralis Femoral L2-L4 N None None None None Normal N N N N   R. Adductor ojsep Obturator L2-L4 N None None None None Normal N N N N   R. Adductor brevis Obturator L2-L4 N None None None None Normal N N N N   R. L5 paraspinal muscle Spinal Nerve L5 N None None None None Normal

## 2024-03-14 ENCOUNTER — OFFICE VISIT (OUTPATIENT)
Dept: INTERNAL MEDICINE CLINIC | Facility: CLINIC | Age: 69
End: 2024-03-14
Payer: MEDICARE

## 2024-03-14 VITALS
HEIGHT: 64 IN | SYSTOLIC BLOOD PRESSURE: 118 MMHG | WEIGHT: 138 LBS | BODY MASS INDEX: 23.56 KG/M2 | HEART RATE: 94 BPM | DIASTOLIC BLOOD PRESSURE: 70 MMHG | OXYGEN SATURATION: 98 %

## 2024-03-14 DIAGNOSIS — M35.00 SJOGREN'S SYNDROME, WITH UNSPECIFIED ORGAN INVOLVEMENT (HCC): ICD-10-CM

## 2024-03-14 DIAGNOSIS — E03.9 ACQUIRED HYPOTHYROIDISM: ICD-10-CM

## 2024-03-14 DIAGNOSIS — M32.9 LUPUS (HCC): ICD-10-CM

## 2024-03-14 DIAGNOSIS — R94.31 ABNORMAL EKG: Primary | ICD-10-CM

## 2024-03-14 DIAGNOSIS — M25.50 PAIN IN JOINT, MULTIPLE SITES: ICD-10-CM

## 2024-03-14 DIAGNOSIS — G43.001 MIGRAINE WITHOUT AURA AND WITH STATUS MIGRAINOSUS, NOT INTRACTABLE: ICD-10-CM

## 2024-03-14 DIAGNOSIS — K21.00 GASTROESOPHAGEAL REFLUX DISEASE WITH ESOPHAGITIS WITHOUT HEMORRHAGE: ICD-10-CM
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
## 2024-03-14 PROBLEM — R22.2 FULLNESS OF SUPRACLAVICULAR FOSSA: Status: RESOLVED | Noted: 2022-04-20 | Resolved: 2024-03-14

## 2024-03-14 PROBLEM — K52.9 CHRONIC DIARRHEA: Status: RESOLVED | Noted: 2022-08-22 | Resolved: 2024-03-14

## 2024-03-14 PROBLEM — G50.0 TRIGEMINAL NEURALGIA: Status: RESOLVED | Noted: 2019-10-29 | Resolved: 2024-03-14

## 2024-03-14 PROBLEM — Z23 NEED FOR VACCINATION: Status: RESOLVED | Noted: 2019-10-29 | Resolved: 2024-03-14

## 2024-03-14 PROBLEM — Z98.890 S/P FOOT SURGERY, RIGHT: Status: RESOLVED | Noted: 2021-03-23 | Resolved: 2024-03-14

## 2024-03-14 PROBLEM — R53.1 WEAKNESS: Status: RESOLVED | Noted: 2021-03-23 | Resolved: 2024-03-14

## 2024-03-14 PROCEDURE — 99215 OFFICE O/P EST HI 40 MIN: CPT | Performed by: INTERNAL MEDICINE

## 2024-03-14 RX ORDER — FLUTICASONE FUROATE, UMECLIDINIUM BROMIDE AND VILANTEROL TRIFENATATE 100; 62.5; 25 UG/1; UG/1; UG/1
1 POWDER RESPIRATORY (INHALATION) DAILY
COMMUNITY
Start: 2023-07-14

## 2024-03-14 RX ORDER — OXYMETAZOLINE HYDROCHLORIDE 1 G/100G
1 CREAM TOPICAL EVERY MORNING
COMMUNITY

## 2024-03-14 RX ORDER — IVERMECTIN 10 MG/G
CREAM TOPICAL
COMMUNITY
Start: 2022-06-01

## 2024-03-14 RX ORDER — BENZONATATE 200 MG/1
CAPSULE ORAL
COMMUNITY
Start: 2023-07-29

## 2024-03-14 RX ORDER — MELOXICAM 7.5 MG/1
TABLET ORAL
COMMUNITY
Start: 2023-12-17

## 2024-03-14 NOTE — PROGRESS NOTES
Debbie Peterson female 68 year old         Chief Complaint   Patient presents with    Establish Care    Discussed multiple medical issues  concerned about recent EKG  septal infarct reading -     Had disagreement with  previous physician     Has  chronic pain  dx not clear  - has Dr Sadler now - has been seeing pain docs - different  diagnosis's suggested     Hx of trigem neuralgia - no issues recently no has migraines that come in stus pattern has appt to see neuro soon    Reviewed  each issue  on problem list  and discussed briefly            Acquired hypothyroidism    Fatigue    Chronic right-sided low back pain with right-sided sciatica    Lupus (HCC)    Sjogren's disease (HCC)    Gastroesophageal reflux disease with esophagitis without hemorrhage    Chronic migraine w/o aura w/o status migrainosus, not intractable    Vitamin D deficiency    Pain in joint, multiple sites    Sedative, hypnotic or anxiolytic use, unspecified with unspecified sedative, hypnotic or anxiolytic-induced disorder (HCC)    Cervical neuritis    Hiatal hernia    Anxiety    left C7 radiculopathy    Cervical disc disease: C6-7    right L3-4 radiculopathy clinically and chronic right L5 radiculopathy on EMG    Pelvic pain    Primary osteoarthritis of both hips: mild    Arthritis of sacroiliac joint: bilateral mild    L5-S1 right mild foraminal bulging disc        Reviewed meds   Current Outpatient Medications on File Prior to Visit   Medication Sig Dispense Refill    benzonatate 200 MG Oral Cap       fluticasone-umeclidin-vilant (TRELEGY ELLIPTA) 100-62.5-25 MCG/ACT Inhalation Aerosol Powder, Breath Activated Inhale 1 puff into the lungs daily.      Meloxicam 7.5 MG Oral Tab       Ivermectin 1 % External Cream APPLY THIN LAYER TOPICALLY TO FACE EVERY DAY      RHOFADE 1 % External Cream Apply 1 Application topically every morning.      methotrexate 2.5 MG Oral Tab Take 3 tablets (7.5 mg total) by mouth once a week.      LORazepam 1 MG  Oral Tab Take 1 tablet (1 mg total) by mouth 3 (three) times daily. 90 tablet 0    HYDROcodone-acetaminophen 5-325 MG Oral Tab Take 1 tablet by mouth in the morning and 1 tablet before bedtime.      SUMAtriptan Succinate (IMITREX) 100 MG Oral Tab Take 1 tablet (100 mg total) by mouth every 2 (two) hours as needed for Migraine. Use at onset; repeat once after 2 HRS-ONLY 2 doses of any sumatriptan (nasal, oral, or injectable) IN 24 HR MAX. 30 tablet 3    SUMAtriptan 20 MG/ACT Nasal Solution 1 spray by Nasal route every 2 (two) hours as needed for Migraine. 1 each 1    azelastine 0.1 % Nasal Solution 2 sprays by Nasal route 2 (two) times daily. 90 mL 1    ipratropium 0.06 % Nasal Solution 2 sprays by Nasal route 4 (four) times daily. 42 mL 3    Estradiol-Norethindrone Acet 0.5-0.1 MG Oral Tab Take 1 tablet by mouth daily. 84 tablet 3    valACYclovir 500 MG Oral Tab       mometasone furoate 50 MCG/ACT Nasal Suspension 1 spray by Nasal route daily. 51 g 2    betamethasone dipropionate 0.05 % External Lotion Apply 2 mL topically daily. To scalp 180 mL 0    famotidine 20 MG Oral Tab Take 1 tablet (20 mg total) by mouth nightly. 90 tablet 1    levothyroxine 150 MCG Oral Tab Take 1 tablet (150 mcg total) by mouth before breakfast. 90 tablet 3    Pseudoephedrine HCl (SUDAFED OR) Take by mouth.      Ibuprofen (MOTRIN OR) Take 200 mg by mouth.      Azelaic Acid (FINACEA) 15 % External Gel Apply topically daily. 1 each 0    SUMAtriptan (SUMATRIPTAN SUCCINATE) 6 MG/0.5ML Subcutaneous Solution Inject 0.5 mL (6 mg total) into the skin daily as needed. 4 mL 0    Syringe, Disposable, (EASY GLIDE LUER LOCK SYRINGE) 1 ML Does not apply Misc Use with sumatriptan daily prn. 25 each 0    Needle, Disp, 26G X 1/2\" Does not apply Misc Use with Imitrex injection. 25 each 0    Syringe, Disposable, 1 ML Does not apply Misc Use with sumatriptan injectable solution 2 each 1    ondansetron 4 MG Oral Tablet Dispersible Take 1 tablet (4 mg total) by  mouth every 8 (eight) hours as needed for Nausea. 20 tablet 0    cholecalciferol 50 MCG (2000 UT) Oral Cap Take 1 capsule (2,000 Units total) by mouth daily. Take 2 tablets daily 90 capsule 0     No current facility-administered medications on file prior to visit.          Vitals:    03/14/24 1207   BP: 118/70   Pulse: 94   VITALSBody mass index is 23.69 kg/m².    Pertinent findings on the physical exam; affect flat  -  cor reg  chest  clear      Reviewed  EKG  -  ?? Septal infarct      Debbie was seen today for establish care.    Diagnoses and all orders for this visit:    Abnormal EKG  -     CARD ECHO 2D DOPPLER (CPT=93306); Future    Migraine without aura and with status migrainosus, not intractable    Acquired hypothyroidism    Lupus (HCC)    Sjogren's syndrome, with unspecified organ involvement (HCC)    Gastroesophageal reflux disease with esophagitis without hemorrhage         Discussed her  EKG and  high false positive  readings - will get echo  to see if any  evidence     Discussed migraines and lupus     Asked about flat affect  - has adjusted her face from her neuralgia -  also her  pain is big issue  affectting her life     I told her I yaritza review chart  in detail -  I did spend 30 min on todays visit             This note was prepared using Dragon Medical voice recognition dictation software and as a result, errors may occur. When identified, these errors have been corrected. While every attempt is made to correct errors during dictation, discrepancies may still exist

## 2024-03-15 ENCOUNTER — PATIENT MESSAGE (OUTPATIENT)
Dept: INTERNAL MEDICINE CLINIC | Facility: CLINIC | Age: 69
End: 2024-03-15

## 2024-03-15 DIAGNOSIS — M25.50 PAIN IN JOINT, MULTIPLE SITES: ICD-10-CM

## 2024-03-15 DIAGNOSIS — E03.9 ACQUIRED HYPOTHYROIDISM: ICD-10-CM

## 2024-03-15 DIAGNOSIS — G43.709 CHRONIC MIGRAINE W/O AURA W/O STATUS MIGRAINOSUS, NOT INTRACTABLE: ICD-10-CM

## 2024-03-15 NOTE — TELEPHONE ENCOUNTER
Please review. Protocol Failed or has no Protocol    Requested Prescriptions   Pending Prescriptions Disp Refills    LORAZEPAM 1 MG Oral Tab [Pharmacy Med Name: LORAZEPAM 1MG TABLETS] 90 tablet 0     Sig: TAKE 1 TABLET(1 MG) BY MOUTH THREE TIMES DAILY       Controlled Substance Medication Failed - 3/14/2024 11:05 AM        Failed - This medication is a controlled substance - forward to provider to refill

## 2024-03-18 RX ORDER — LORAZEPAM 1 MG/1
1 TABLET ORAL 3 TIMES DAILY
Qty: 90 TABLET | Refills: 0 | OUTPATIENT
Start: 2024-03-18

## 2024-03-18 NOTE — TELEPHONE ENCOUNTER
These have not been prescribed by you yet.   Requested Prescriptions     Pending Prescriptions Disp Refills    Fluocinonide 0.05 % External Solution 60 mL 0     Sig: Apply 1 Application  topically daily.    omeprazole 20 MG Oral Capsule Delayed Release 90 capsule 0     Sig: Take 1 capsule (20 mg total) by mouth before breakfast.    azelastine 0.1 % Nasal Solution 90 mL 1     Si sprays by Nasal route 2 (two) times daily.    betamethasone dipropionate 0.05 % External Lotion 180 mL 0     Sig: Apply 2 mL topically daily. To scalp    Estradiol-Norethindrone Acet 0.5-0.1 MG Oral Tab 84 tablet 3     Sig: Take 1 tablet by mouth daily.    famotidine 20 MG Oral Tab 90 tablet 1     Sig: Take 1 tablet (20 mg total) by mouth nightly.    ipratropium 0.06 % Nasal Solution 42 mL 3     Si sprays by Nasal route 4 (four) times daily.    levothyroxine 150 MCG Oral Tab 90 tablet 3     Sig: Take 1 tablet (150 mcg total) by mouth before breakfast.    LORazepam 1 MG Oral Tab 90 tablet 0     Sig: Take 1 tablet (1 mg total) by mouth 3 (three) times daily.    SUMAtriptan 20 MG/ACT Nasal Solution 1 each 1     Si spray by Nasal route every 2 (two) hours as needed for Migraine.     LAST REFILL DATE    QUANTITY REQUESTED    DAY SUPPLY    DIAGNOSIS    LAST OFFICE VISIT  3/14/24   FOLLOW UP DUE      Future Appointments   Date Time Provider Department Center   3/19/2024  1:00 PM [facility] MRI RM1 (1.5T) [facility] MRI EM [facility]   3/19/2024  1:45 PM [facility] MRI RM1 (1.5T) [facility] EM [facility]   2024  8:45 AM Joshua Irving MD [facility] [place] [facility]   2024 11:20 AM Andrea Burnham MD EMMG5 [facility] 5 [facility]   2024 11:15 AM [facility] CARD RM3 ECHO [facility] NI CARD EM [facility] [place]   2024 11:00 AM [facility]

## 2024-03-18 NOTE — TELEPHONE ENCOUNTER
Protocol Failed/ No Protocol    Requested Prescriptions   Pending Prescriptions Disp Refills    LORAZEPAM 1 MG Oral Tab [Pharmacy Med Name: LORAZEPAM 1MG TABLETS] 90 tablet 0     Sig: TAKE 1 TABLET(1 MG) BY MOUTH THREE TIMES DAILY       Controlled Substance Medication Failed - 3/15/2024  6:37 PM        Failed - This medication is a controlled substance - forward to provider to refill             Future Appointments         Provider Department Appt Notes    In 2 days [facility] MRI RM1 (1.5T) [facility] system     In 2 days [facility] MRI 1 (1.5T) Health system     In 2 weeks Joshua Irving MD [facility] Rfd Dr. Sadler/essential tremors    In 2 weeks [facility] FOLLOW UP    In 1 month [facility] CARD RM3 ECHO [facility] Order in Epic from Dr. Burnham 03/14/2024 clg.    In 1 month Horacio aSdler MD [facility] F/U          Recent Outpatient Visits              3 days ago Abnormal EKG    [facility]    Office Visit    1 month ago Lupus (HCC)    [facility] Horacio Sadler MD    Office Visit    1 month ago Migraine without aura and with status migrainosus, not intractable    [facility] Alessandro Rodríguez MD    Office Visit    3 months ago Chronic migraine w/o aura w/o status migrainosus, not intractable    [facility] Dina Narvaez APRN    Telemedicine    6 months ago Encounter for screening mammogram for malignant neoplasm of breast    [facility] Alessandro Rodríguez MD    Office Visit

## 2024-03-19 ENCOUNTER — HOSPITAL ENCOUNTER (OUTPATIENT)
Dept: MRI IMAGING | Facility: HOSPITAL | Age: 69
Discharge: HOME | End: 2024-03-19
Attending: PHYSICAL MEDICINE & REHABILITATION
Payer: MEDICARE

## 2024-03-19 DIAGNOSIS — M54.12 CERVICAL RADICULOPATHY: ICD-10-CM

## 2024-03-19 DIAGNOSIS — M54.16 LUMBAR RADICULOPATHY: ICD-10-CM

## 2024-03-19 PROCEDURE — 72148 MRI LUMBAR SPINE W/O DYE: CPT | Performed by: PHYSICAL MEDICINE & REHABILITATION

## 2024-03-19 PROCEDURE — 72141 MRI NECK SPINE W/O DYE: CPT | Performed by: PHYSICAL MEDICINE & REHABILITATION

## 2024-03-20 ENCOUNTER — HOSPITAL ENCOUNTER (OUTPATIENT)
Age: 69
Discharge: HOME | End: 2024-03-20
Payer: MEDICARE

## 2024-03-20 VITALS
OXYGEN SATURATION: 100 % | RESPIRATION RATE: 20 BRPM | TEMPERATURE: 98 F | HEART RATE: 83 BPM | DIASTOLIC BLOOD PRESSURE: 71 MMHG | SYSTOLIC BLOOD PRESSURE: 124 MMHG

## 2024-03-20 DIAGNOSIS — Z76.0 ENCOUNTER FOR MEDICATION REFILL: Primary | ICD-10-CM

## 2024-03-20 DIAGNOSIS — F41.9 ANXIETY: ICD-10-CM

## 2024-03-20 RX ORDER — LORAZEPAM 1 MG/1
1 TABLET ORAL 3 TIMES DAILY
Qty: 20 TABLET | Refills: 0 | Status: SHIPPED | OUTPATIENT
Start: 2024-03-20 | End: 2024-03-22

## 2024-03-20 NOTE — ED PROVIDER NOTES
Patient Seen in: [facility]      History     Chief Complaint   Patient presents with    Medication Eval     Stated Complaint: help with medication    Subjective:   HPI    This is a well appearing 67 y/o female who presents for a chief complaint of medication refill. Pt reports that her PCP retired. Pt states that he did not refill her Lorazepam.  Typically she takes it for chronic pain and for sleeping.  Patient is a retired physician, her physician recently retired and did not give her a refill on her prescription.  Her last medication refill was in early February.  Patient is in the process of getting a new primary care doctor but will not have enough medication to make it through.    Objective:   Past Medical History:   Diagnosis Date    Arthritis     Autoimmune disorder ([facility])     Lupus    Cervical disc displacement     C4-C5, C5-C6 disc displacement    Chronic pain     Right foot after  foot surgery    Depression     Daughter  (adopted)    Difficulty urinating     Hiatal hernia with gastroesophageal reflux     Hypothyroidism     Infertility     Interstitial cystitis     Migraine     Migraines     Peripheral neuropathy     Reactive depression (situational)     when daughter     Scalp psoriasis     SI (sacroiliac) joint dysfunction     Sjogren's disease ([facility])     Traumatic brain injury ([facility]) concussion after fall     Tremor L hand tremor and mandibular claudication or dystonia.    Trigeminal neuralgia pain     Vaginal stricture     Vertigo               Past Surgical History:   Procedure Laterality Date    ARTHROSCOPY, SHOULDER, SURGICAL; W/ROTATOR CUFF REPAIR Right     subclavian reconstruction      BIOPSY OF SKIN LESION  2020    CATARACT EXTRACTION Bilateral     COLONOSCOPY  2012    CORRECT BUNION,SIMPLE Right     EGD  2012    FOOT FRACTURE SURGERY Right     OTHER SURGICAL HISTORY      heel: sural nerve neuroma excision -- achilles tendon  accidentally injured during surgery                Social History     Socioeconomic History    Marital status: Single    Number of children: 0   Occupational History    Occupation: psychiatrist     Comment: retired   Tobacco Use    Smoking status: Never    Smokeless tobacco: Never    Tobacco comments:     Never even tried   Vaping Use    Vaping Use: Never used   Substance and Sexual Activity    Alcohol use: Not Currently     Comment: rare    Drug use: Never    Sexual activity: Not Currently   Other Topics Concern    Caffeine Concern Yes     Comment: tea, 1 cup              Review of Systems   All other systems reviewed and are negative.      Positive for stated complaint: help with medication  Other systems are as noted in HPI.  Constitutional and vital signs reviewed.      All other systems reviewed and negative except as noted above.    Physical Exam     ED Triage Vitals [03/20/24 0941]   /71   Pulse 83   Resp 20   Temp 98.1 °F (36.7 °C)   Temp src Temporal   SpO2 100 %   O2 Device None (Room air)       Current:/71   Pulse 83   Temp 98.1 °F (36.7 °C) (Temporal)   Resp 20   SpO2 100%         Physical Exam  Vitals and nursing note reviewed.   Constitutional:       General: She is awake. She is not in acute distress.     Appearance: Normal appearance. She is not ill-appearing, toxic-appearing or diaphoretic.   HENT:      Head: Normocephalic.   Eyes:      Pupils: Pupils are equal, round, and reactive to light.   Pulmonary:      Effort: Pulmonary effort is normal.      Breath sounds: Normal breath sounds and air entry.   Skin:     General: Skin is warm.   Neurological:      General: No focal deficit present.      Mental Status: She is alert.   Psychiatric:         Behavior: Behavior is cooperative.       ED Course   Labs Reviewed - No data to display    MDM     Medical Decision Making  Patient is well-appearing, [place]  reviewed.  I did discuss with her I would only be willing to give her 7 days.   She would not be able to get this refilled here again.  I did give her a list of physicians she should follow up with.  Patient verbalized the plan of care and stated understanding.        Disposition and Plan     Clinical Impression:  1. Encounter for medication refill    2. Anxiety         Disposition:  Discharge  3/20/2024 10:13 am    Follow-up:  Edel Veras DO  [address] [place] [id number]  [phone]                Medications Prescribed:  Discharge Medication List as of 3/20/2024 10:21 AM        START taking these medications    Details   !! LORAZEPAM 1 MG Oral Tab Take 1 tablet (1 mg total) by mouth 3 (three) times daily for 7 days., Normal, Disp-20 tablet, R-0, TIERNEY       !! - Potential duplicate medications found. Please discuss with provider.

## 2024-03-20 NOTE — ED INITIAL ASSESSMENT (HPI)
Pt states saw a new PCP last week, pt states reviewed medication with PCP and was told at the time that does use lorazepam for sleep and pain. Pt states is claustrophobic and had 2 MRI done and takes that medication prior to that took that last of her lorazepam. Pt states called PCP for refill, but PCP refused as he had never prescribed it himself.    Normal rate, regular rhythm.  Heart sounds S1, S2.  No murmurs, rubs or gallops.

## 2024-03-22 DIAGNOSIS — F41.9 ANXIETY: ICD-10-CM

## 2024-03-22 RX ORDER — FAMOTIDINE 20 MG/1
20 TABLET, FILM COATED ORAL NIGHTLY
Qty: 90 TABLET | Refills: 1 | Status: SHIPPED | OUTPATIENT
Start: 2024-03-22

## 2024-03-22 RX ORDER — AZELASTINE 1 MG/ML
2 SPRAY, METERED NASAL 2 TIMES DAILY
Qty: 90 ML | Refills: 1 | Status: SHIPPED | OUTPATIENT
Start: 2024-03-22

## 2024-03-22 RX ORDER — FLUOCINONIDE TOPICAL SOLUTION USP, 0.05% 0.5 MG/ML
1 SOLUTION TOPICAL DAILY
Qty: 60 ML | Refills: 0 | Status: SHIPPED | OUTPATIENT
Start: 2024-03-22

## 2024-03-22 RX ORDER — SUMATRIPTAN 20 MG/1
1 SPRAY NASAL EVERY 2 HOUR PRN
Qty: 1 EACH | Refills: 1 | Status: SHIPPED | OUTPATIENT
Start: 2024-03-22 | End: 2024-04-02

## 2024-03-22 RX ORDER — LEVOTHYROXINE SODIUM 0.15 MG/1
150 TABLET ORAL
Qty: 90 TABLET | Refills: 3 | Status: SHIPPED | OUTPATIENT
Start: 2024-03-22

## 2024-03-22 RX ORDER — BETAMETHASONE DIPROPIONATE 0.5 MG/G
2 LOTION TOPICAL DAILY
Qty: 180 ML | Refills: 0 | Status: SHIPPED | OUTPATIENT
Start: 2024-03-22

## 2024-03-22 RX ORDER — OMEPRAZOLE 20 MG/1
20 CAPSULE, DELAYED RELEASE ORAL
Qty: 90 CAPSULE | Refills: 0 | Status: SHIPPED | OUTPATIENT
Start: 2024-03-22

## 2024-03-22 RX ORDER — IPRATROPIUM BROMIDE 42 UG/1
2 SPRAY, METERED NASAL 4 TIMES DAILY
Qty: 42 ML | Refills: 3 | Status: SHIPPED | OUTPATIENT
Start: 2024-03-22

## 2024-03-22 RX ORDER — LORAZEPAM 1 MG/1
1 TABLET ORAL 3 TIMES DAILY
Qty: 90 TABLET | Refills: 0 | Status: SHIPPED | OUTPATIENT
Start: 2024-03-22 | End: 2024-04-21

## 2024-03-22 RX ORDER — LORAZEPAM 1 MG/1
1 TABLET ORAL 3 TIMES DAILY
Qty: 90 TABLET | Refills: 0
Start: 2024-03-22

## 2024-04-02 ENCOUNTER — OFFICE VISIT (OUTPATIENT)
Dept: NEUROLOGY | Facility: CLINIC | Age: 69
End: 2024-04-02
Payer: MEDICARE

## 2024-04-02 ENCOUNTER — LAB ENCOUNTER (OUTPATIENT)
Dept: LAB | Facility: HOSPITAL | Age: 69
End: 2024-04-02
Attending: Other
Payer: MEDICARE

## 2024-04-02 DIAGNOSIS — G25.0 ESSENTIAL TREMOR: ICD-10-CM

## 2024-04-02 DIAGNOSIS — G43.709 CHRONIC MIGRAINE W/O AURA W/O STATUS MIGRAINOSUS, NOT INTRACTABLE: ICD-10-CM

## 2024-04-02 DIAGNOSIS — G43.001 MIGRAINE WITHOUT AURA AND WITH STATUS MIGRAINOSUS, NOT INTRACTABLE: ICD-10-CM

## 2024-04-02 DIAGNOSIS — G25.0 ESSENTIAL TREMOR: Primary | ICD-10-CM

## 2024-04-02 DIAGNOSIS — M54.81 OCCIPITAL NEURALGIA OF LEFT SIDE: ICD-10-CM

## 2024-04-02 LAB
CERULOPLASMIN SERPL-MCNC: 34.8 MG/DL (ref 20–60)
VIT B12 SERPL-MCNC: 494 PG/ML (ref 211–911)

## 2024-04-02 PROCEDURE — 82607 VITAMIN B-12: CPT | Performed by: OTHER

## 2024-04-02 PROCEDURE — 84207 ASSAY OF VITAMIN B-6: CPT | Performed by: OTHER

## 2024-04-02 PROCEDURE — 82390 ASSAY OF CERULOPLASMIN: CPT

## 2024-04-02 PROCEDURE — 99204 OFFICE O/P NEW MOD 45 MIN: CPT | Performed by: OTHER

## 2024-04-02 PROCEDURE — 84446 ASSAY OF VITAMIN E: CPT | Performed by: OTHER

## 2024-04-02 PROCEDURE — 36415 COLL VENOUS BLD VENIPUNCTURE: CPT | Performed by: OTHER

## 2024-04-02 RX ORDER — SUMATRIPTAN 100 MG/1
100 TABLET, FILM COATED ORAL EVERY 2 HOUR PRN
Qty: 27 TABLET | Refills: 3 | Status: SHIPPED | OUTPATIENT
Start: 2024-04-02

## 2024-04-02 RX ORDER — SUMATRIPTAN 20 MG/1
1 SPRAY NASAL EVERY 2 HOUR PRN
Qty: 1 EACH | Refills: 11 | Status: SHIPPED | OUTPATIENT
Start: 2024-04-02

## 2024-04-02 RX ORDER — PROPRANOLOL HYDROCHLORIDE 20 MG/1
TABLET ORAL
Qty: 30 TABLET | Refills: 5 | Status: SHIPPED | OUTPATIENT
Start: 2024-04-02

## 2024-04-02 RX ORDER — SUMATRIPTAN 6 MG/.5ML
6 INJECTION, SOLUTION SUBCUTANEOUS DAILY PRN
Qty: 4 ML | Refills: 11 | Status: SHIPPED | OUTPATIENT
Start: 2024-04-02

## 2024-04-02 NOTE — PROGRESS NOTES
[facility] NEUROSCIENCES [address], [address]  [facility] [address]  [phone]            Neurology Initial Visit     Referred By: Dr. Sadler    Chief Complaint:   Chief Complaint   Patient presents with    Tremors     New patient presents with migraines, and essential tremors. Patient states that she noticed her tremors about (2 years ago) and they are on her right side.patient states that her grandmother had tremors and that her tremors has affected her ADL's.Patient is taking SUMAtriptan 20 MG/ACT Nasal Solution,SUMAtriptan Succinate (IMITREX) 100 MG Oral Tab, and SUMAtriptan (SUMATRIPTAN SUCCINATE) 6 MG/0.5ML Subcutaneous Solution.       HPI:     Debbie Peterson is a 68 year old female, who presents for history of migraines, tremors.  Patient has been seen by different physicians in the past, she was living in [place] at one time.  She was also followed with [facility] specialists.  She had history of trigeminal neuralgia but has improved since 2019.  That was on the right side.  However since at least  she has been developing migraines in the left side of the head, described as typical migraines, associate with throbbing sensation, light sensitive, nausea.    On the first visit with me in 2024 she was reporting them ibuprofen every 4 months, but when they happen the last for 2 weeks each time.  She had hard time breaking the cycle of those headaches.  Also history of tremors.  Mostly at the action and posture.  Does not affect her daily activities overall that much.  Her grandmother in her 90s also had history of tremors.      Past Medical History:   Diagnosis Date    Arthritis     Autoimmune disorder (HCC)     Lupus    Cervical disc displacement     C4-C5, C5-C6 disc displacement    Chronic pain     Right foot after 2020 foot surgery    Depression 2019    Daughter  (adopted)    Difficulty urinating     Hiatal hernia with gastroesophageal reflux      Hypothyroidism     Infertility     Interstitial cystitis     Migraine     Migraines     Peripheral neuropathy     Reactive depression (situational)     when daughter     Scalp psoriasis     SI (sacroiliac) joint dysfunction     Sjogren's disease (HCC)     Traumatic brain injury (HCC) concussion after fall     Tremor L hand tremor and mandibular claudication or dystonia.    Trigeminal neuralgia pain     Vaginal stricture     Vertigo        Past Surgical History:   Procedure Laterality Date    ARTHROSCOPY, SHOULDER, SURGICAL; W/ROTATOR CUFF REPAIR Right     subclavian reconstruction      BIOPSY OF SKIN LESION  2020    CATARACT EXTRACTION Bilateral     COLONOSCOPY  2012    CORRECT BUNION,SIMPLE Right     EGD  2012    FOOT FRACTURE SURGERY Right     OTHER SURGICAL HISTORY      heel: sural nerve neuroma excision -- achilles tendon accidentally injured during surgery       Social history:  History   Smoking Status    Never   Smokeless Tobacco    Never     History   Alcohol Use Not Currently     Comment: rare     History   Drug Use Unknown       Family History   Problem Relation Age of Onset    Dementia Father         dementia induced by MRSA sepsis (cause of death)    Infectious Disease Father         MRSA sepsis (cause of death)    Breast Cancer Maternal Aunt 65    Hypertension Mother     Obesity Mother     Depression Mother     Fibromyalgia Sister     Alcohol and Other Disorders Associated Brother         alcoholism    Depression Brother     Depression Other         family h/o    Other (drug use) Brother          Current Outpatient Medications:     propranolol 20 MG Oral Tab, Take 1 pill PRN before going out, Disp: 30 tablet, Rfl: 5    SUMAtriptan (SUMATRIPTAN SUCCINATE) 6 MG/0.5ML Subcutaneous Solution, Inject 0.5 mL (6 mg total) into the skin daily as needed., Disp: 4 mL, Rfl: 11    SUMAtriptan 20 MG/ACT Nasal Solution, 1 spray by Nasal route every 2 (two) hours as needed for  Migraine., Disp: 1 each, Rfl: 11    SUMAtriptan Succinate (IMITREX) 100 MG Oral Tab, Take 1 tablet (100 mg total) by mouth every 2 (two) hours as needed for Migraine. Use at onset; repeat once after 2 HRS-ONLY 2 doses of any sumatriptan (nasal, oral, or injectable) IN 24 HR MAX., Disp: 27 tablet, Rfl: 3    Fluocinonide 0.05 % External Solution, Apply 1 Application  topically daily., Disp: 60 mL, Rfl: 0    omeprazole 20 MG Oral Capsule Delayed Release, Take 1 capsule (20 mg total) by mouth before breakfast., Disp: 90 capsule, Rfl: 0    azelastine 0.1 % Nasal Solution, 2 sprays by Nasal route 2 (two) times daily., Disp: 90 mL, Rfl: 1    betamethasone dipropionate 0.05 % External Lotion, Apply 2 mL topically daily. To scalp, Disp: 180 mL, Rfl: 0    famotidine 20 MG Oral Tab, Take 1 tablet (20 mg total) by mouth nightly., Disp: 90 tablet, Rfl: 1    ipratropium 0.06 % Nasal Solution, 2 sprays by Nasal route 4 (four) times daily., Disp: 42 mL, Rfl: 3    levothyroxine 150 MCG Oral Tab, Take 1 tablet (150 mcg total) by mouth before breakfast., Disp: 90 tablet, Rfl: 3    LORAZEPAM 1 MG Oral Tab, Take 1 tablet (1 mg total) by mouth 3 (three) times daily., Disp: 90 tablet, Rfl: 0    benzonatate 200 MG Oral Cap, , Disp: , Rfl:     fluticasone-umeclidin-vilant (TRELEGY ELLIPTA) 100-62.5-25 MCG/ACT Inhalation Aerosol Powder, Breath Activated, Inhale 1 puff into the lungs daily., Disp: , Rfl:     Meloxicam 7.5 MG Oral Tab, , Disp: , Rfl:     Ivermectin 1 % External Cream, APPLY THIN LAYER TOPICALLY TO FACE EVERY DAY, Disp: , Rfl:     RHOFADE 1 % External Cream, Apply 1 Application topically every morning., Disp: , Rfl:     methotrexate 2.5 MG Oral Tab, Take 3 tablets (7.5 mg total) by mouth once a week., Disp: , Rfl:     LORazepam 1 MG Oral Tab, Take 1 tablet (1 mg total) by mouth 3 (three) times daily., Disp: 90 tablet, Rfl: 0    HYDROcodone-acetaminophen 5-325 MG Oral Tab, Take 1 tablet by mouth in the morning and 1 tablet  before bedtime., Disp: , Rfl:     Estradiol-Norethindrone Acet 0.5-0.1 MG Oral Tab, Take 1 tablet by mouth daily., Disp: 84 tablet, Rfl: 3    valACYclovir 500 MG Oral Tab, , Disp: , Rfl:     mometasone furoate 50 MCG/ACT Nasal Suspension, 1 spray by Nasal route daily., Disp: 51 g, Rfl: 2    Pseudoephedrine HCl (SUDAFED OR), Take by mouth., Disp: , Rfl:     Ibuprofen (MOTRIN OR), Take 200 mg by mouth., Disp: , Rfl:     Azelaic Acid (FINACEA) 15 % External Gel, Apply topically daily., Disp: 1 each, Rfl: 0    Syringe, Disposable, (EASY GLIDE LUER LOCK SYRINGE) 1 ML Does not apply Misc, Use with sumatriptan daily prn., Disp: 25 each, Rfl: 0    Needle, Disp, 26G X 1/2\" Does not apply Misc, Use with Imitrex injection., Disp: 25 each, Rfl: 0    Syringe, Disposable, 1 ML Does not apply Misc, Use with sumatriptan injectable solution, Disp: 2 each, Rfl: 1    ondansetron 4 MG Oral Tablet Dispersible, Take 1 tablet (4 mg total) by mouth every 8 (eight) hours as needed for Nausea., Disp: 20 tablet, Rfl: 0    cholecalciferol 50 MCG (2000 UT) Oral Cap, Take 1 capsule (2,000 Units total) by mouth daily. Take 2 tablets daily, Disp: 90 capsule, Rfl: 0    Allergies   Allergen Reactions    Gabapentin OTHER (SEE COMMENTS)     Upper and lower extremity ataxia and cognitive impairment     Ropinirole OTHER (SEE COMMENTS)     Vertigo     Naproxen OTHER (SEE COMMENTS)    Sulfa Antibiotics HIVES    Trimethoprim HIVES    Biaxin [Clarithromycin] ITCHING    Sulfamethoxazole W/Trimethoprim ITCHING       ROS:   As in HPI, the rest of the 14 system review was done and was negative      Physical Exam:  There were no vitals filed for this visit.    General: No apparent distress, well nourished, well groomed.  Head- Normocephalic, atraumatic  Eyes- No redness or swelling  ENT- Hearing intake, normal glutition  Neck- No masses or adenopathy  Cv: pulses were palpable and normal, no cyanosis or edema     Neurological:     Mental Status- Alert and  oriented x3.  Normal attention span and concentration  Thought process intact  Memory intact- recent and remote  Mood intact  Fund of knowledge appropriate for education and age    Language intact including: comprehension, naming, repetition, vocabulary    Cranial Nerves:    VII. Face symmetric, no facial weakness  VIII. Hearing intact to whisper.  IX. Pallet elevates symmetrically.  XI. Shoulder shrug is intact  XII. Tongue is midline    Motor Exam:  Muscle tone normal  No atrophy or fasciculations  Strength- upper extremities 5/5 proximally and distally                  Rapid alternating movements intact    Gait:  Normal posture  Normal physiologic      Labs:    Lab Results   Component Value Date    TSH 2.050 05/30/2023     Lab Results   Component Value Date    HDL 91 (H) 05/30/2023    LDL 88 05/30/2023    TRIG 80 05/30/2023     Lab Results   Component Value Date    HGB 13.8 02/21/2024    HCT 40.6 02/21/2024    MCV 95.5 02/21/2024    WBC 6.1 02/21/2024    .0 02/21/2024      Lab Results   Component Value Date    BUN 25 (H) 02/21/2024    CA 9.3 02/21/2024    ALT 14 02/21/2024    AST 16 02/21/2024    ALKPHOS 47 09/23/2014    ALB 4.3 02/21/2024     02/21/2024    K 3.6 02/21/2024     02/21/2024    CO2 27.0 02/21/2024      I have reviewed labs.      Assessment   1. Essential tremor  Patient with possible essential tremors versus exaggerated physiological tremors.  At this point they are not very severe.  Patient decided against any continues treatment but she might use propranolol occasionally before going out.  Additional blood will be done MRI will be done to rule out any other etiology.  - Ceruloplasmin; Future  - Vitamin B12  - Vitamin B6  - Vitamin E, Serum  - MRI BRAIN (CPT=70551); Future    2. Chronic migraine w/o aura w/o status migrainosus, not intractable  Patient with episodic migraines, but every time lasting for 2 weeks at a time.  We discussed the plan will be that she will call us  immediately if that happens and we will order migraine cocktail, combined with occipital and supraorbital nerve block at the same time.    - SUMAtriptan 20 MG/ACT Nasal Solution; 1 spray by Nasal route every 2 (two) hours as needed for Migraine.  Dispense: 1 each; Refill: 11    3. Migraine without aura and with status migrainosus, not intractable    - SUMAtriptan Succinate (IMITREX) 100 MG Oral Tab; Take 1 tablet (100 mg total) by mouth every 2 (two) hours as needed for Migraine. Use at onset; repeat once after 2 HRS-ONLY 2 doses of any sumatriptan (nasal, oral, or injectable) IN 24 HR MAX.  Dispense: 27 tablet; Refill: 3    4. Occipital neuralgia of left side    - Specialty Other Referral - External           Education and counseling provided to patient. Instructed patient to call my office or seek medical attention immediately if symptoms worsen.  Patient verbalized understanding of information given. All questions were answered. All side effects of drugs were discussed.       Return to clinic in: Return in about 6 months (around 10/2/2024).    Joshua Irving MD

## 2024-04-03 ENCOUNTER — TELEPHONE (OUTPATIENT)
Dept: PHYSICAL MEDICINE AND REHAB | Facility: CLINIC | Age: 69
End: 2024-04-03

## 2024-04-03 NOTE — TELEPHONE ENCOUNTER
Per CMS Guidelines -no authorization is required for Occipital nerve block CPT 09808,     Status: Authorization is not required based on medical necessity however may be subject to review once claim is submitted-Covered Benefit

## 2024-04-04 ENCOUNTER — TELEPHONE (OUTPATIENT)
Dept: OBGYN | Age: 69
End: 2024-04-04

## 2024-04-06 LAB
VIT E ALPHA TOCO: 9.1 MG/L
VIT E GAMMA TOCO: 1.2 MG/L

## 2024-04-07 LAB — VITAMIN B6: 8.3 UG/L

## 2024-04-08 ENCOUNTER — OFFICE VISIT (OUTPATIENT)
Dept: OBGYN CLINIC | Facility: CLINIC | Age: 69
End: 2024-04-08
Payer: MEDICARE

## 2024-04-08 VITALS
DIASTOLIC BLOOD PRESSURE: 76 MMHG | WEIGHT: 135.38 LBS | HEIGHT: 64 IN | SYSTOLIC BLOOD PRESSURE: 128 MMHG | BODY MASS INDEX: 23.11 KG/M2

## 2024-04-08 DIAGNOSIS — N95.0 POSTMENOPAUSAL BLEEDING: Primary | ICD-10-CM

## 2024-04-08 RX ORDER — MISOPROSTOL 100 UG/1
TABLET ORAL
Qty: 2 TABLET | Refills: 0 | Status: SHIPPED | OUTPATIENT
Start: 2024-04-08

## 2024-04-08 NOTE — PROGRESS NOTES
GYN H&P   NEW PT    2024  9:59 AM    CC: Patient is here for 1 W of vaginal bleeding. Patient is retired MD    HPI: Patient is a 68 year old  presented with 1 W of vaginal bleeding, sometimes heavy.     She had an usn 3/23 which showed a thicker endometrium on HRT, with polyps. She initially saw Dr. Ramon, then Radha who compared usn and told her no different. She was not bleeding 1 year ago and sought 2nd opinion and preferred to wait and see. She initially had USN just b/c she was on HRT.    Her previous MD tried to do an endometrial bx in the office and was unsuccessful.     No LMP recorded. Patient is postmenopausal.    OB History    Para Term  AB Living   3       3     SAB IAB Ectopic Multiple Live Births   2   1          # Outcome Date GA Lbr Alejandro/2nd Weight Sex Delivery Anes PTL Lv   3 Ectopic               Birth Comments: occurred while in [place] -- no surgery No blood transfusion   2 SAB            1 SAB               Obstetric Comments   Pt cannot recall how many sab had; had IVF       GYN hx:    Hx Prior Abnormal Pap: Yes (13 HPV+)  Pap Date: 23  Pap Result Notes: Neg      Past Medical History:   Diagnosis Date    Arthritis     Autoimmune disorder ([facility])     Lupus    Cervical disc displacement     C4-C5, C5-C6 disc displacement    Chronic pain     Right foot after  foot surgery    Depression 2019    Daughter  (adopted)    Difficulty urinating     Hiatal hernia with gastroesophageal reflux     Hypothyroidism     Infertility     Interstitial cystitis     Lupus (HCC)     Migraine     Migraines     Peripheral neuropathy     Reactive depression (situational)     when daughter     Scalp psoriasis     SI (sacroiliac) joint dysfunction     Sjogren's disease ([facility])     Traumatic brain injury ([facility]) concussion after fall     Tremor L hand tremor and mandibular claudication or dystonia.    Trigeminal neuralgia pain     Vaginal stricture     Vasculitis  (HCC)     nose and sinus    Vertigo      Past Surgical History:   Procedure Laterality Date    ARTHROSCOPY, SHOULDER, SURGICAL; W/ROTATOR CUFF REPAIR Right 2012    subclavian reconstruction      BIOPSY OF SKIN LESION  09/2020    CATARACT EXTRACTION Bilateral 2021    COLONOSCOPY  2012    CORRECT BUNION,SIMPLE Right 2004    EGD  2012    FOOT FRACTURE SURGERY Right 2020    OTHER SURGICAL HISTORY  2005    heel: sural nerve neuroma excision -- achilles tendon accidentally injured during surgery     Allergies   Allergen Reactions    Gabapentin OTHER (SEE COMMENTS)     Upper and lower extremity ataxia and cognitive impairment     Ropinirole OTHER (SEE COMMENTS)     Vertigo     Naproxen OTHER (SEE COMMENTS)    Sulfa Antibiotics HIVES    Trimethoprim HIVES    Biaxin [Clarithromycin] ITCHING    Sulfamethoxazole W/Trimethoprim ITCHING     Family History   Problem Relation Age of Onset    Hypertension Mother     Obesity Mother     Depression Mother     Dementia Father         dementia induced by MRSA sepsis (cause of death)    Infectious Disease Father         MRSA sepsis (cause of death)    Fibromyalgia Sister     Alcohol and Other Disorders Associated Brother         alcoholism    Depression Brother     Other (drug use) Brother     Breast Cancer Maternal Aunt 65    Depression Other         family h/o    Ovarian Cancer Neg     Colon Cancer Neg      Social History     Socioeconomic History    Marital status: Single    Number of children: 0   Occupational History    Occupation: psychiatrist     Comment: retired   Tobacco Use    Smoking status: Never    Smokeless tobacco: Never   Vaping Use    Vaping Use: Never used   Substance and Sexual Activity    Alcohol use: Not Currently     Comment: rare    Drug use: Never    Sexual activity: Not Currently     Social History     Social History Narrative    Lives alone    Feels safe    No hx of abuse       Medications reviewed. See active list.     /76   Ht 64\"   Wt 135 lb 6.4 oz  (61.4 kg)   BMI 23.24 kg/m²       Exam:   GENERAL: well developed, well nourished in NAD    Narrative   PROCEDURE: US PELVIS W EV (CPT=76856/25917)     COMPARISON: [facility], CT ABDOMEN + PELVIS (CONTRAST ONLY) (CPT=74177), 1/15/2022, 1:48 PM.     INDICATIONS: Menopause     TECHNIQUE: Pelvic ultrasound using transabdominal and transvaginal technique.  A transvaginal scan was performed for characterization of the uterus and adnexa.     FINDINGS:  UTERUS:   Uterus is anteverted measuring 76 x 35 x 45 mm (62.9 mL).     ENDOMETRIUM: Endometrial thickness is 11-mm with heterogeneous echotexture.  Nodular 16 x 9 x 13 mm echogenic nonshadowing focus within the ventral-right paramedian aspect of the fundal region of the canal.  Cervix is distended with fluid and along the  dorsal-left aspect of the cervical lumen there is a 7 x 3 x 4 mm.  Both these foci are surrounded by a thin band of fluid, and both of these foci have internal color flow suggesting vascularity.    MYOMETRIUM: Normal echogenicity.  No masses.       OVARIES AND ADNEXA:     RIGHT:   Right ovary measures 17 x 13 x 12 mm (1.4 mL).  No suspicious solid or cystic lesion.  LEFT:   Left ovary measures 19 x 7 x 16 mm (1.0 mL).  No suspicious solid or cystic lesion     CUL-DE-SAC:   Normal.  No free fluid or mass.    OTHER: Negative.  Bladder appears normal.                 Impression   CONCLUSION:  1.  Endometrial thickness is 11 mm, which is abnormal for postmenopausal female.  Additionally, there are nodular foci within the ventral-right-fundal region of the endometrial canal and within the left-dorsal aspect of the cervical canal, which have  internal color flow/vascularity making them suspicious for polyps.  Recommend additional workup.  2.  Normal ultrasound appearance of the ovaries.        Dictated by (CST): Jairo Dickinson MD on 3/17/2023 at 4:01 PM      Finalized by (CST): Jairo Dickinson MD on 3/17/2023 at 4:08 PM         A/P: Patient is 68  year old female     1. Postmenopausal bleeding  - miSOPROStol 100 MCG Oral Tab; Take 2 tablets per vagina the night prior to the procedure.  Dispense: 2 tablet; Refill: 0    Plan hysteroscopy/d&c. I discussed with the patient  the procedure including the preop/procedure/postop course and the risks of  bleeding, infection, damage to internal organs.  All questions were answered, and written information was given to the pt regarding the procedure. Recommend cytotec the night prior to the procedure     I sent pt a list of dates I was available and she will let me know what works for her.     Rosmery Morfin MD

## 2024-04-09 ENCOUNTER — APPOINTMENT (OUTPATIENT)
Dept: OBGYN | Age: 69
End: 2024-04-09

## 2024-04-12 ENCOUNTER — TELEPHONE (OUTPATIENT)
Dept: OBGYN CLINIC | Facility: CLINIC | Age: 69
End: 2024-04-12

## 2024-04-12 DIAGNOSIS — N95.0 PMB (POSTMENOPAUSAL BLEEDING): Primary | ICD-10-CM

## 2024-04-12 NOTE — TELEPHONE ENCOUNTER
Patient is calling regarding scheduling her surgery, per patient is requesting the date of 5/10/2024 and also that she has a hard time getting transportation and wants to know if possible to have it schedule for late morning or early afternoon. Please assist patient.

## 2024-04-16 ENCOUNTER — TELEPHONE (OUTPATIENT)
Dept: OBGYN CLINIC | Facility: CLINIC | Age: 69
End: 2024-04-16

## 2024-04-16 NOTE — TELEPHONE ENCOUNTER
Procedure: hysteroscopy/d&c   Assist: (Y/N or none) none   Date:   5/10/2024                                   Time Requested:   Dx: postmenopausal bleeding   Pre-op appt: (Y/N or n/a) no   Admission type: (IN/OUT/OBVS) outpt   Department of discharge(SDS/Floor): SDS   Expected length of stay:   Procedure length time (please enter amount you are requesting): 1 hour   Recovery time (patients always ask):   Medical Clearance: (Y/N)   Post- Op f/u appt time frame:     Pre-op orders (choose one)   X Use [facility] procedure driven order set in addition to anesthesia protocol   Use [facility] surgeon's personalized order set   Surgeon to enter pre-op orders   No pre-op orders   Use the prophylactic antibiotic protocol   No pre-op antibiotic orders indicated do not give antibiotic, if any, listed on the procedure driven order sets or personalized order sets     PCN allergy Yes___ or No___   If Yes: Proceed with PCN/Cephalosporin recommended antibiotic as benefit outweighs risk   Proceed with PCN/Cephalosporin recommended antibiotic as not a true allergy   Proceed with recommended alternative antibiotic for PCN allergy

## 2024-04-17 ENCOUNTER — HOSPITAL ENCOUNTER (OUTPATIENT)
Dept: CV DIAGNOSTICS | Facility: HOSPITAL | Age: 69
Discharge: HOME | End: 2024-04-17
Attending: INTERNAL MEDICINE
Payer: MEDICARE

## 2024-04-17 ENCOUNTER — TELEPHONE (OUTPATIENT)
Dept: NEUROLOGY | Facility: CLINIC | Age: 69
End: 2024-04-17

## 2024-04-17 DIAGNOSIS — G43.709 CHRONIC MIGRAINE W/O AURA W/O STATUS MIGRAINOSUS, NOT INTRACTABLE: Primary | ICD-10-CM

## 2024-04-17 DIAGNOSIS — R94.31 ABNORMAL EKG: ICD-10-CM

## 2024-04-17 PROCEDURE — 93306 TTE W/DOPPLER COMPLETE: CPT | Performed by: INTERNAL MEDICINE

## 2024-04-17 RX ORDER — DIVALPROEX SODIUM 500 MG/1
500 TABLET, EXTENDED RELEASE ORAL DAILY
Qty: 5 TABLET | Refills: 0 | Status: SHIPPED | OUTPATIENT
Start: 2024-04-17 | End: 2024-04-22

## 2024-04-17 RX ORDER — CALCIUM CARBONATE 300MG(750)
400 TABLET,CHEWABLE ORAL 2 TIMES DAILY
Qty: 10 TABLET | Refills: 0 | Status: SHIPPED | OUTPATIENT
Start: 2024-04-17

## 2024-04-17 RX ORDER — DIPHENHYDRAMINE HCL 25 MG
TABLET ORAL
Qty: 5 TABLET | Refills: 0 | Status: SHIPPED | OUTPATIENT
Start: 2024-04-17

## 2024-04-17 RX ORDER — METOCLOPRAMIDE 5 MG/1
5 TABLET ORAL 2 TIMES DAILY
Qty: 10 TABLET | Refills: 0 | Status: SHIPPED | OUTPATIENT
Start: 2024-04-17 | End: 2024-04-22

## 2024-04-17 RX ORDER — METHYLPREDNISOLONE 4 MG/1
TABLET ORAL
Qty: 1 EACH | Refills: 0 | Status: SHIPPED | OUTPATIENT
Start: 2024-04-17

## 2024-04-19 ENCOUNTER — OFFICE VISIT (OUTPATIENT)
Dept: INTERNAL MEDICINE CLINIC | Facility: CLINIC | Age: 69
End: 2024-04-19
Payer: MEDICARE

## 2024-04-19 VITALS
OXYGEN SATURATION: 99 % | WEIGHT: 138 LBS | SYSTOLIC BLOOD PRESSURE: 116 MMHG | HEART RATE: 85 BPM | BODY MASS INDEX: 23.56 KG/M2 | DIASTOLIC BLOOD PRESSURE: 68 MMHG | HEIGHT: 64 IN

## 2024-04-19 DIAGNOSIS — G43.001 MIGRAINE WITHOUT AURA AND WITH STATUS MIGRAINOSUS, NOT INTRACTABLE: ICD-10-CM

## 2024-04-19 DIAGNOSIS — I07.1 TRICUSPID VALVE INSUFFICIENCY, UNSPECIFIED ETIOLOGY: Primary | ICD-10-CM

## 2024-04-19 DIAGNOSIS — M35.00 SJOGREN'S SYNDROME, WITH UNSPECIFIED ORGAN INVOLVEMENT (HCC): ICD-10-CM

## 2024-04-19 DIAGNOSIS — M32.9 LUPUS (HCC): ICD-10-CM

## 2024-04-19 DIAGNOSIS — M47.818 ARTHRITIS OF SACROILIAC JOINT: ICD-10-CM

## 2024-04-19 PROCEDURE — 99214 OFFICE O/P EST MOD 30 MIN: CPT | Performed by: INTERNAL MEDICINE

## 2024-04-19 NOTE — PROGRESS NOTES
Debbie Peterson female 68 year old         Chief Complaint   Patient presents with    Test Results     Echo after false high positive EKG     Migraine today     Recent  migraine  -  going on for week -  immi reese  x 2 daily - some relief -      Sxs are  throbbing pain  left side, eye - nausea  ,  vertigo sometimes  ,     No trigem sxs  had it in past      Now  on migrane  cocktail -  medrol depakote reglan (out of stock )  benadryl and mag oxide   - day 2      Has had  vag bleeding  -     Saw pain doc recently       Discussed  echo done for possible  septal  infarct on EKG  - has TR  mild  to moderate     Has  new  r[facility]um at [facility]   saw  pain doc  at  - reviwed notes  together  for  20 min      Patient Active Problem List   Diagnosis    Acquired hypothyroidism    Fatigue    Chronic right-sided low back pain with right-sided sciatica    Lupus (HCC)    Sjogren's disease (HCC)    Gastroesophageal reflux disease with esophagitis without hemorrhage    Chronic migraine w/o aura w/o status migrainosus, not intractable    Vitamin D deficiency    Pain in joint, multiple sites    Sedative, hypnotic or anxiolytic use, unspecified with unspecified sedative, hypnotic or anxiolytic-induced disorder (HCC)    Cervical neuritis    Hiatal hernia    Anxiety    left C7 radiculopathy    Cervical disc disease: C6-7    right L3-4 radiculopathy clinically and chronic right L5 radiculopathy on EMG    Pelvic pain    Primary osteoarthritis of both hips: mild    Arthritis of sacroiliac joint: bilateral mild    L5-S1 right mild foraminal bulging disc    Postmenopausal bleeding          Current Outpatient Medications on File Prior to Visit   Medication Sig Dispense Refill    diphenhydrAMINE HCl 25 MG Oral Tab 1 pill nighty for 5 days 5 tablet 0    divalproex  MG Oral Tablet 24 Hr Take 1 tablet (500 mg total) by mouth daily for 5 days. 5 tablet 0    methylPREDNISolone (MEDROL) 4 MG Oral Tablet Therapy Pack Take as directed 1 each 0     metoclopramide 5 MG Oral Tab Take 1 tablet (5 mg total) by mouth in the morning and 1 tablet (5 mg total) before bedtime. Do all this for 5 days. 10 tablet 0    Magnesium 400 MG Oral Tab Take 400 mg by mouth in the morning and 400 mg before bedtime. 10 tablet 0    propranolol 20 MG Oral Tab Take 1 pill PRN before going out 30 tablet 5    SUMAtriptan (SUMATRIPTAN SUCCINATE) 6 MG/0.5ML Subcutaneous Solution Inject 0.5 mL (6 mg total) into the skin daily as needed. 4 mL 11    SUMAtriptan 20 MG/ACT Nasal Solution 1 spray by Nasal route every 2 (two) hours as needed for Migraine. 1 each 11    SUMAtriptan Succinate (IMITREX) 100 MG Oral Tab Take 1 tablet (100 mg total) by mouth every 2 (two) hours as needed for Migraine. Use at onset; repeat once after 2 HRS-ONLY 2 doses of any sumatriptan (nasal, oral, or injectable) IN 24 HR MAX. 27 tablet 3    Fluocinonide 0.05 % External Solution Apply 1 Application  topically daily. 60 mL 0    omeprazole 20 MG Oral Capsule Delayed Release Take 1 capsule (20 mg total) by mouth before breakfast. 90 capsule 0    azelastine 0.1 % Nasal Solution 2 sprays by Nasal route 2 (two) times daily. 90 mL 1    betamethasone dipropionate 0.05 % External Lotion Apply 2 mL topically daily. To scalp 180 mL 0    famotidine 20 MG Oral Tab Take 1 tablet (20 mg total) by mouth nightly. 90 tablet 1    ipratropium 0.06 % Nasal Solution 2 sprays by Nasal route 4 (four) times daily. 42 mL 3    levothyroxine 150 MCG Oral Tab Take 1 tablet (150 mcg total) by mouth before breakfast. 90 tablet 3    benzonatate 200 MG Oral Cap       fluticasone-umeclidin-vilant (TRELEGY ELLIPTA) 100-62.5-25 MCG/ACT Inhalation Aerosol Powder, Breath Activated Inhale 1 puff into the lungs daily.      Meloxicam 7.5 MG Oral Tab       Ivermectin 1 % External Cream APPLY THIN LAYER TOPICALLY TO FACE EVERY DAY      RHOFADE 1 % External Cream Apply 1 Application topically every morning.      methotrexate 2.5 MG Oral Tab Take 3 tablets (7.5  mg total) by mouth once a week.      LORazepam 1 MG Oral Tab Take 1 tablet (1 mg total) by mouth 3 (three) times daily. 90 tablet 0    Estradiol-Norethindrone Acet 0.5-0.1 MG Oral Tab Take 1 tablet by mouth daily. 84 tablet 3    valACYclovir 500 MG Oral Tab       mometasone furoate 50 MCG/ACT Nasal Suspension 1 spray by Nasal route daily. 51 g 2    Pseudoephedrine HCl (SUDAFED OR) Take by mouth.      Ibuprofen (MOTRIN OR) Take 200 mg by mouth.      Azelaic Acid (FINACEA) 15 % External Gel Apply topically daily. 1 each 0    Syringe, Disposable, (EASY GLIDE LUER LOCK SYRINGE) 1 ML Does not apply Misc Use with sumatriptan daily prn. 25 each 0    Needle, Disp, 26G X 1/2\" Does not apply Misc Use with Imitrex injection. 25 each 0    Syringe, Disposable, 1 ML Does not apply Misc Use with sumatriptan injectable solution 2 each 1    ondansetron 4 MG Oral Tablet Dispersible Take 1 tablet (4 mg total) by mouth every 8 (eight) hours as needed for Nausea. 20 tablet 0    cholecalciferol 50 MCG (2000 UT) Oral Cap Take 1 capsule (2,000 Units total) by mouth daily. Take 2 tablets daily 90 capsule 0    miSOPROStol 100 MCG Oral Tab Take 2 tablets per vagina the night prior to the procedure. (Patient not taking: Reported on 4/19/2024) 2 tablet 0    HYDROcodone-acetaminophen 5-325 MG Oral Tab Take 1 tablet by mouth in the morning and 1 tablet before bedtime. (Patient not taking: Reported on 4/19/2024)       No current facility-administered medications on file prior to visit.          Vitals:    04/19/24 1110   BP: 116/68   Pulse: 85   VITALSBody mass index is 23.69 kg/m².    Pertinent findings on the physical exam; affect  is  improved today  despite       Debbie was seen today for test results.    Diagnoses and all orders for this visit:    Tricuspid valve insufficiency, unspecified etiology    Migraine without aura and with status migrainosus, not intractable    Sjogren's syndrome, with unspecified organ involvement (HCC)    Lupus  (HCC)    Arthritis of sacroiliac joint: bilateral mild         Dealing  with  [facility]  now       Discussed multiple  med issues -     her biggest issue is her low back pain  can't sit     Has had  multiple  interventions  suggested has lumb ar disc dz  and  LS  dz  - SI   fusion  - neuro modualtor     Has  seen rheum and  w/u   and treatment  for lupus and  autoimmune     Discussed  TR -  not severe -offered cardiac consult however she is already seeing a lot of physicians.  So we will recheck echo in 1 year.  She does not have any pulmonary hypertension.  I am not clear if it is primary or secondary.  There is no obvious secondary causes.    Most recent labs all look stable.    We discussed polypharmacy.    I would like you to make a list of her diagnosis is the compelling aspect of it medications what the proxy for the diseases.    This note was prepared using Dragon Medical voice recognition dictation software and as a result, errors may occur. When identified, these errors have been corrected. While every attempt is made to correct errors during dictation, discrepancies may still exist

## 2024-04-22 ENCOUNTER — OFFICE VISIT (OUTPATIENT)
Dept: PHYSICAL MEDICINE AND REHAB | Facility: CLINIC | Age: 69
End: 2024-04-22
Payer: MEDICARE

## 2024-04-22 VITALS — HEIGHT: 64 IN | WEIGHT: 138 LBS | BODY MASS INDEX: 23.56 KG/M2

## 2024-04-22 DIAGNOSIS — K21.00 GASTROESOPHAGEAL REFLUX DISEASE WITH ESOPHAGITIS WITHOUT HEMORRHAGE: ICD-10-CM

## 2024-04-22 DIAGNOSIS — G43.709 CHRONIC MIGRAINE W/O AURA W/O STATUS MIGRAINOSUS, NOT INTRACTABLE: ICD-10-CM

## 2024-04-22 DIAGNOSIS — M50.90 CERVICAL DISC DISEASE: ICD-10-CM

## 2024-04-22 DIAGNOSIS — M54.12 CERVICAL RADICULOPATHY: ICD-10-CM

## 2024-04-22 DIAGNOSIS — M35.00 SJOGREN'S SYNDROME, WITH UNSPECIFIED ORGAN INVOLVEMENT (HCC): ICD-10-CM

## 2024-04-22 DIAGNOSIS — G89.29 CHRONIC RIGHT-SIDED LOW BACK PAIN WITH RIGHT-SIDED SCIATICA: ICD-10-CM

## 2024-04-22 DIAGNOSIS — M54.16 LUMBAR RADICULOPATHY: ICD-10-CM

## 2024-04-22 DIAGNOSIS — M51.9 LUMBAR DISC DISEASE: Primary | ICD-10-CM

## 2024-04-22 DIAGNOSIS — M47.818 ARTHRITIS OF SACROILIAC JOINT: ICD-10-CM

## 2024-04-22 DIAGNOSIS — M54.41 CHRONIC RIGHT-SIDED LOW BACK PAIN WITH RIGHT-SIDED SCIATICA: ICD-10-CM

## 2024-04-22 PROCEDURE — 99214 OFFICE O/P EST MOD 30 MIN: CPT | Performed by: PHYSICAL MEDICINE & REHABILITATION

## 2024-04-22 NOTE — PATIENT INSTRUCTIONS
Plan  I will perform right L5 TFESI(s).    She will need to get back into the PT for the lumbar spine after she has had the above.    I will address her cervical spine once the lumbar spine is doing better.    The patient will follow up in 2-3 months, but the patient will call me 2 weeks after having the injection to let me know how the injection worked.'

## 2024-04-22 NOTE — PROGRESS NOTES
Low Back [facility]    Chief Complaint:   Chief Complaint   Patient presents with    Follow - Up     EMG 3/12/2024 pt comes in to f/u on EMG. Presents with R buttock, R groin and R thigh aching/burning pain. Admits tingling. Admits weakness. Rates pain 4/10. Takes Denies medication. MRI of L-spine done.      Nursing note reviewed and verified.    Patient was last seen on 2024.  On 3/12/2024, I did the EMG of the right leg.  Then on 3/20/2024, She had a right L5 TFESI with 40 mg of Kenalog which helped her.  She had less pain at night in bed and she has less pain with sitting and she was more mobile.  She feels that now she is back to where she was prior to having the injection.  She has been doing a lot of driving.      Description of the Pain  The pain is located in the right buttock, groin and medial thigh pain.  She is having some right low back pain.  She will get right buttock and lateral hip shooting pain if she lays on her right side.  The pain at its best is 1/10. The pain at its worst is 5/10. The pain is currently  4/10.  The pain is described as a(n) burning and sharp sensation.    The pain is worse sitting.    The tingling is much less in her bilateral legs.  There is tingling in the right groin.There is no numbness.  There is weakness in the right leg.  She has difficulty coordinating the motion of the right leg.    Past Medical History   Past Medical History:    Arthritis    Autoimmune disorder ([facility])    Lupus    Cervical disc displacement    C4-C5, C5-C6 disc displacement    Chronic pain    Right foot after 2020 foot surgery    Depression    Daughter  (adopted)    Difficulty urinating    Hiatal hernia with gastroesophageal reflux    Hypothyroidism    Infertility    Interstitial cystitis    Lupus (HCC)    Migraine    Migraines    Peripheral neuropathy    Reactive depression (situational)    when daughter     Scalp psoriasis    SI (sacroiliac) joint dysfunction    Sjogren's disease (HCC)     Traumatic brain injury (HCC)    Tremor    Trigeminal neuralgia pain    Vaginal stricture    Vasculitis (HCC)    nose and sinus    Vertigo       Past Surgical History   Past Surgical History:   Procedure Laterality Date    Arthroscopy, shoulder, surgical; w/rotator cuff repair Right 2012    subclavian reconstruction      Biopsy of skin lesion  09/2020    Cataract extraction Bilateral 2021    Colonoscopy  2012    Correct bunion,simple Right 2004    Egd  2012    Foot fracture surgery Right 2020    Other surgical history  2005    heel: sural nerve neuroma excision -- achilles tendon accidentally injured during surgery       Family History   Family History   Problem Relation Age of Onset    Hypertension Mother     Obesity Mother     Depression Mother     Dementia Father         dementia induced by MRSA sepsis (cause of death)    Infectious Disease Father         MRSA sepsis (cause of death)    Fibromyalgia Sister     Alcohol and Other Disorders Associated Brother         alcoholism    Depression Brother     Other (drug use) Brother     Breast Cancer Maternal Aunt 65    Depression Other         family h/o    Ovarian Cancer Neg     Colon Cancer Neg        Social History   Social History     Socioeconomic History    Marital status: Single     Spouse name: Not on file    Number of children: 0    Years of education: Not on file    Highest education level: Not on file   Occupational History    Occupation: psychiatrist     Comment: retired   Tobacco Use    Smoking status: Never    Smokeless tobacco: Never   Vaping Use    Vaping status: Never Used   Substance and Sexual Activity    Alcohol use: Not Currently     Comment: rare    Drug use: Never    Sexual activity: Not Currently   Other Topics Concern     Service Not Asked    Blood Transfusions No    Caffeine Concern Yes     Comment: tea, 1 cup    Occupational Exposure Not Asked    Hobby Hazards Not Asked    Sleep Concern Not Asked    Stress Concern Not Asked    Weight  Concern Not Asked    Special Diet Not Asked    Back Care Not Asked    Exercise Not Asked    Bike Helmet Not Asked    Seat Belt Not Asked    Self-Exams Not Asked   Social History Narrative    Lives alone    Feels safe    No hx of abuse     Social Determinants of Health     Financial Resource Strain: Not At Risk (7/6/2022)    [facility]    Financial Resource Strain     How hard is it for you to pay for the very basics like food, housing, medical care, and heating?: Not hard at all   Food Insecurity: No Food Insecurity (7/6/2022)    [facility]    Food Insecurity     Currently or in the past 3 months, have you worried your food would run out before you had money to buy more?: No     In the past 12 months, have you run out of food or been unable to get more?: No   Transportation Needs: Unmet Transportation Needs (7/6/2022)    [facility]    Transportation Needs     Currently or in the past 3 months, has lack of transportation kept you from medical appointments, getting food or medicine, or providing care to a family member?: Not on file     Has the lack of transportation kept you from meetings, work, or from getting things needed for daily living?: Not on file     Medical Transportation Needs?: Yes     Daily Living Transportation Needs? [Peds Only] : Yes   Physical Activity: Not on file   Stress: Not on file   Social Connections: Not At Risk (7/6/2022)    [facility]    Social Connections     In a typical week, how many times do you talk on the phone with family, friends, or neighbors?: Once a week   Housing Stability: Low Risk  (7/15/2023)    [facility] from [facility], [facility]    Housing Stability     Mortgage Payment  Concerns?: Not on file     Number of Places Lived in the Last Year: Not on file     Unstable Housing?: Not on file       PE:  The patient does appear in her stated age in no distress.  The patient is well groomed.    Psychiatric:  The patient is alert and oriented x 3.  The patient has a normal affect and mood.      Respiratory:  No acute respiratory distress. Patient does not have a cough.    HEENT:  Extraocular muscles are intact. There is no kern icterus. Pupils are equal, round, and reactive to light. No redness or discharge bilaterally.    Skin:  There are no rashes or lesions.    Vitals:  There were no vitals filed for this visit.    Gait:    Gait: Normal gait   Sit to Stand: no difficulty      Lumbar Spine:    Scoliosis: No scoliosis present     Lumbar Spine Palpation:    Spinous Processes: Tender at L5 and S1   Z-joints: Tender at  right L5-S1   SIJ: Tender at right SIJ   Piriformis Muscle: Non-tender bilateral Piriformis muscles   Greater Trochanteric Bursa: Tender at right Greater Trochanteric Bursa(s)     Vascular lower extremity:   Dorsalis pedis pulse-RIGHT 2+   Dorsalis pedis pulse-LEFT 2+   Tibialis posterior pulse-RIGHT 2+   Tibialis posterior pulse-LEFT 2+     Neurological Lower Extremity:    Light Touch Sensation: Intact in bilateral Lower Extremities   LE Muscle Strength: All LE strength measurements 5/5 except:  Hamstring RIGHT:   3+/5  Hamstring LEFT:   4/5   RIGHT plantar reflexes: downward response   LEFT plantar reflexes: downward response   Reflexes: 2+ in bilateral lower extremities     Hip: Hips are stable.    RIGHT hip ROM normal   LEFT hip ROM normal   Right hip flexion Negative pain   LEFT hip flexion Negative pain   RIGHT hip RUIZ test Positive for right buttock pain   LEFT hip RUIZ test Negative for pain   RIGHT hip internal rotation Positive for right groin pain   LEFT hip internal rotation Negative for pain     Assessment  1. L5-S1 right mild foraminal bulging disc    2. right  L3-4 radiculopathy clinically and chronic right L5 radiculopathy on EMG    3. Sjogren's syndrome, with unspecified organ involvement (HCC)    4. Chronic migraine w/o aura w/o status migrainosus, not intractable    5. left C7 radiculopathy    6. Cervical disc disease: C6-7    7. Gastroesophageal reflux disease with esophagitis without hemorrhage    8. Arthritis of sacroiliac joint: bilateral mild    9. Chronic right-sided low back pain with right-sided sciatica         Plan  I will perform right L5 TFESI(s).    She will need to get back into the PT for the lumbar spine after she has had the above.    I will address her cervical spine once the lumbar spine is doing better.    The patient will follow up in 2-3 months, but the patient will call me 2 weeks after having the injection to let me know how the injection worked.'    The patient understands and agrees with the stated plan.  Horacio Sadler MD  4/22/2024

## 2024-04-23 ENCOUNTER — TELEPHONE (OUTPATIENT)
Dept: PHYSICAL MEDICINE AND REHAB | Facility: CLINIC | Age: 69
End: 2024-04-23

## 2024-04-23 ENCOUNTER — TELEPHONE (OUTPATIENT)
Dept: INTERNAL MEDICINE CLINIC | Facility: CLINIC | Age: 69
End: 2024-04-23

## 2024-04-23 ENCOUNTER — PATIENT MESSAGE (OUTPATIENT)
Dept: OBGYN CLINIC | Facility: CLINIC | Age: 69
End: 2024-04-23

## 2024-04-23 DIAGNOSIS — N95.0 PMB (POSTMENOPAUSAL BLEEDING): Primary | ICD-10-CM

## 2024-04-23 DIAGNOSIS — M54.16 LUMBAR RADICULOPATHY: ICD-10-CM

## 2024-04-23 DIAGNOSIS — M54.12 CERVICAL RADICULOPATHY: Primary | ICD-10-CM

## 2024-04-23 RX ORDER — ESTRADIOL AND NORETHINDRONE ACETATE .5; .1 MG/1; MG/1
1 TABLET ORAL DAILY
Qty: 84 TABLET | Refills: 3
Start: 2024-04-23

## 2024-04-23 NOTE — TELEPHONE ENCOUNTER
Per CMS Guidelines -no authorization is required for Right L5 TFESI CPT 07696     Status: Authorization is not required based on medical necessity however is not a guarantee of payment and may be subject to review once claim is submitted-Covered Benefit

## 2024-04-23 NOTE — TELEPHONE ENCOUNTER
RE: Medications  Received: 1 month ago  Andrea Burnham MD    Phone Number: [phone]     Please refill all  for now for   3 mos 1 refill  -  with exception of  estrogen -  that should go thru her gyn          Previous Messages

## 2024-04-24 NOTE — TELEPHONE ENCOUNTER
Patient has been scheduled for Right L5 transforaminal epidural steroid injection on 5/14/24 at the [facility] with Dr. Sadler.   -Anesthesia type:  Local  -Patient was advised that if he/she does receive the covid vaccine it needs to be at least 2 weeks before or after the injection.  -Medications and allergies reviewed.  -Patient reminded to hold NSAIDs (Ibuprofen, ASA 81, Aleve, Naproxen, Mobic, Diclofenac, Etodolac, Celebrex etc.) for 3 days prior to Lumbar MBB/Facet if BMI is greater than 35. For Cervical injections only hold multivitamins, Vitamin E, Fish Oil, Phentermine/Lomaira for 7 days prior to injection and NSAIDS.   mg to be held for 7 days prior to injections.  -If patient is receiving MAC/IVCS, weight loss oral/injectable medications will need to be held for 7 days prior to injection.  -Patient informed to fast 12 hours prior to procedure with IVCS/MAC.   -If on blood thinner, clearance has been received and approved to hold this medication by provider.   -Patient informed of [facility]'s  policy:  he/she will need a  to and from procedure and must be on site for their entirety of their visit, if their ride is unable to the procedure will be cancelled.   -[facility] is located in the [facility] 1st floor,  may park in the yellow/purple parking lot.  Patient verbalized understanding and agrees with plan.  Scheduled in Epic: Yes  Scheduled in Surgical Case: Yes  Follow up appointment made: NOV: Visit date not found

## 2024-04-29 ENCOUNTER — OFFICE VISIT (OUTPATIENT)
Facility: CLINIC | Age: 69
End: 2024-04-29
Payer: MEDICARE

## 2024-04-29 ENCOUNTER — PATIENT MESSAGE (OUTPATIENT)
Dept: PHYSICAL MEDICINE AND REHAB | Facility: CLINIC | Age: 69
End: 2024-04-29

## 2024-04-29 VITALS
WEIGHT: 139 LBS | HEART RATE: 87 BPM | OXYGEN SATURATION: 98 % | HEIGHT: 64 IN | DIASTOLIC BLOOD PRESSURE: 74 MMHG | SYSTOLIC BLOOD PRESSURE: 128 MMHG | BODY MASS INDEX: 23.73 KG/M2

## 2024-04-29 DIAGNOSIS — M25.511 CHRONIC RIGHT SHOULDER PAIN: Primary | ICD-10-CM

## 2024-04-29 DIAGNOSIS — G89.29 CHRONIC RIGHT SHOULDER PAIN: Primary | ICD-10-CM

## 2024-04-29 DIAGNOSIS — M25.50 PAIN IN JOINT, MULTIPLE SITES: ICD-10-CM

## 2024-04-29 PROCEDURE — 96127 BRIEF EMOTIONAL/BEHAV ASSMT: CPT | Performed by: FAMILY MEDICINE

## 2024-04-29 PROCEDURE — 99203 OFFICE O/P NEW LOW 30 MIN: CPT | Performed by: FAMILY MEDICINE

## 2024-04-29 NOTE — PROGRESS NOTES
HPI:    Patient ID: Debbie Peterson is a 68 year old female who presents to [facility] care.    HPI  Retired psychiatrist.     Has right shoulder pain. Had injection 6 months ago. Had surgery for rotator cuff and biceps tendon in her 40s. Fell 3 years ago and has new tears in rotator cuff. Had PT which helped. Has had multiple shoulder injections (maybe 2x/year), and they usually do help quite a bit.   Taking meloxicam daily prn, and was initially prescribed for foot pain.     Sees OBGYN, neurology, physiatry, pain specialist at , dermatologist.   Saw rheumatologist at . Restarted methotrexate and folate at that time.   Dx with Sjogren's and Lupus in her 30s.   Her chronic joint pains, and particularly back pains, are most inhibiting & bothersome to her.     Past Medical History:    Arthritis    Autoimmune disorder (HCC)    Lupus    Cervical disc displacement    C4-C5, C5-C6 disc displacement    Chronic pain    Right foot after 2020 foot surgery    Depression    Daughter  (adopted)    Difficulty urinating    Hiatal hernia with gastroesophageal reflux    Hypothyroidism    Infertility    Interstitial cystitis    Lupus (HCC)    Migraine    Migraines    Peripheral neuropathy    Reactive depression (situational)    when daughter     Scalp psoriasis    SI (sacroiliac) joint dysfunction    Sjogren's disease (HCC)    Traumatic brain injury (HCC)    Tremor    Trigeminal neuralgia pain    Vaginal stricture    Vasculitis (HCC)    nose and sinus    Vertigo        Current Outpatient Medications   Medication Sig Dispense Refill    miSOPROStol 100 MCG Oral Tab Take 2 tablets per vagina the night prior to the procedure. 2 tablet 0    propranolol 20 MG Oral Tab Take 1 pill PRN before going out 30 tablet 5    SUMAtriptan 20 MG/ACT Nasal Solution 1 spray by Nasal route every 2 (two) hours as needed for Migraine. 1 each 11    SUMAtriptan Succinate (IMITREX) 100 MG Oral Tab Take 1 tablet (100 mg total) by mouth every 2  (two) hours as needed for Migraine. Use at onset; repeat once after 2 HRS-ONLY 2 doses of any sumatriptan (nasal, oral, or injectable) IN 24 HR MAX. 27 tablet 3    Fluocinonide 0.05 % External Solution Apply 1 Application  topically daily. 60 mL 0    omeprazole 20 MG Oral Capsule Delayed Release Take 1 capsule (20 mg total) by mouth before breakfast. 90 capsule 0    azelastine 0.1 % Nasal Solution 2 sprays by Nasal route 2 (two) times daily. 90 mL 1    betamethasone dipropionate 0.05 % External Lotion Apply 2 mL topically daily. To scalp 180 mL 0    famotidine 20 MG Oral Tab Take 1 tablet (20 mg total) by mouth nightly. 90 tablet 1    ipratropium 0.06 % Nasal Solution 2 sprays by Nasal route 4 (four) times daily. 42 mL 3    levothyroxine 150 MCG Oral Tab Take 1 tablet (150 mcg total) by mouth before breakfast. 90 tablet 3    fluticasone-umeclidin-vilant (TRELEGY ELLIPTA) 100-62.5-25 MCG/ACT Inhalation Aerosol Powder, Breath Activated Inhale 1 puff into the lungs daily.      Meloxicam 7.5 MG Oral Tab       Ivermectin 1 % External Cream APPLY THIN LAYER TOPICALLY TO FACE EVERY DAY      RHOFADE 1 % External Cream Apply 1 Application topically every morning.      methotrexate 2.5 MG Oral Tab Take 3 tablets (7.5 mg total) by mouth once a week.      LORazepam 1 MG Oral Tab Take 1 tablet (1 mg total) by mouth 3 (three) times daily. 90 tablet 0    Estradiol-Norethindrone Acet 0.5-0.1 MG Oral Tab Take 1 tablet by mouth daily. 84 tablet 3    valACYclovir 500 MG Oral Tab       mometasone furoate 50 MCG/ACT Nasal Suspension 1 spray by Nasal route daily. 51 g 2    Azelaic Acid (FINACEA) 15 % External Gel Apply topically daily. 1 each 0    Syringe, Disposable, (EASY GLIDE LUER LOCK SYRINGE) 1 ML Does not apply Misc Use with sumatriptan daily prn. 25 each 0    Needle, Disp, 26G X 1/2\" Does not apply Misc Use with Imitrex injection. 25 each 0    Syringe, Disposable, 1 ML Does not apply Misc Use with sumatriptan injectable solution  2 each 1    ondansetron 4 MG Oral Tablet Dispersible Take 1 tablet (4 mg total) by mouth every 8 (eight) hours as needed for Nausea. 20 tablet 0    cholecalciferol 50 MCG (2000 UT) Oral Cap Take 1 capsule (2,000 Units total) by mouth daily. Take 2 tablets daily 90 capsule 0    Ibuprofen (MOTRIN OR) Take 200 mg by mouth.          Allergies   Allergen Reactions    Gabapentin OTHER (SEE COMMENTS)     Upper and lower extremity ataxia and cognitive impairment     Ropinirole OTHER (SEE COMMENTS)     Vertigo     Naproxen OTHER (SEE COMMENTS)    Sulfa Antibiotics HIVES    Trimethoprim HIVES    Biaxin [Clarithromycin] ITCHING    Sulfamethoxazole W/Trimethoprim ITCHING       Review of Systems   Constitutional: Negative.    Musculoskeletal:  Positive for arthralgias.            /74   Pulse 87   Ht 5' 4\" (1.626 m)   Wt 139 lb (63 kg)   SpO2 98%   BMI 23.86 kg/m²     PHYSICAL EXAM:   Physical Exam  Constitutional:       General: She is not in acute distress.     Appearance: Normal appearance. She is well-developed. She is not ill-appearing, toxic-appearing or diaphoretic.   HENT:      Head: Normocephalic and atraumatic.      Right Ear: External ear normal.      Left Ear: External ear normal.      Nose: Nose normal.      Mouth/Throat:      Mouth: Mucous membranes are moist.      Pharynx: Oropharynx is clear.   Eyes:      Extraocular Movements: Extraocular movements intact.      Conjunctiva/sclera: Conjunctivae normal.   Cardiovascular:      Rate and Rhythm: Normal rate and regular rhythm.      Heart sounds: Normal heart sounds. No murmur heard.     No friction rub. No gallop.   Pulmonary:      Effort: Pulmonary effort is normal. No respiratory distress.      Breath sounds: Normal breath sounds. No wheezing or rales.   Musculoskeletal:      Right shoulder: No swelling, deformity, effusion, tenderness, bony tenderness or crepitus. Normal range of motion. Normal strength. Normal pulse.      Cervical back: Neck supple.       Right lower leg: No edema.      Left lower leg: No edema.      Comments: Right Shoulder Special Tests:  Neg Empty can test, Neg Pain w/ ER, Pos Pain w/ IR, Pos Neer's impingement sign, Pos Crossover test, Neg Yergason's test   Lymphadenopathy:      Cervical: No cervical adenopathy.   Skin:     General: Skin is warm and dry.      Capillary Refill: Capillary refill takes less than 2 seconds.   Neurological:      General: No focal deficit present.      Mental Status: She is alert.   Psychiatric:         Mood and Affect: Mood normal.         Behavior: Behavior normal.         Thought Content: Thought content normal.         Judgment: Judgment normal.             ASSESSMENT/PLAN:     Encounter Diagnosis   Name Primary?    Chronic right shoulder pain Yes       1. Chronic right shoulder pain     -Chronic and uncontrolled.   -Reviewed prior imaging results, although unable to see right shoulder imaging from last year. Was able to find results from early 2022.  -Would like repeat kenalog injection.  -I would like to obtain most recent imaging to review prior to administering an injection. Pt to confirm home records. We have also sent record release form to [facility] to obtain XR and MRI results.   -Once received, I will reach out to patient and likely coordinate a time to add her on for a repeat injection.   -May continue meloxicam prn in the meantime, as well as HEP.     Meds This Visit:  Requested Prescriptions      No prescriptions requested or ordered in this encounter       Imaging & Referrals:  None       Alirio Ching DO  ID#[id number]

## 2024-04-30 ENCOUNTER — TELEPHONE (OUTPATIENT)
Dept: OBGYN CLINIC | Facility: CLINIC | Age: 69
End: 2024-04-30

## 2024-04-30 NOTE — TELEPHONE ENCOUNTER
From: Debbie Peterson  To: Horacio Sadler  Sent: 4/29/2024 8:48 PM CDT  Subject: May 14 Appt. for Transforaminal Injection    I received a message that the appt. for 5/14 has been cancelled. Is this correct?  I am cancelling the gynecologist D+C scheduled for May 10--for other reasons.   I hope the L5 injection can be rescheduled.    Since I continue to have severe sitting pain:   1) Do you still perform botox inj of piriformis, if indicated?  2) Do you perform U/S guided Obturator Nerve blocks?  If indicated.   Thank you. DIANE Peterson

## 2024-04-30 NOTE — TELEPHONE ENCOUNTER
Please schedule the following surgery:     Procedure: hysteroscopy/d&c   Assist: (Y/N or none) none   Date:   5/10/2024                                   Time Requested:   Dx: postmenopausal bleeding   Pre-op appt: (Y/N or n/a) no   Admission type: (IN/OUT/OBVS) outpt   Department of discharge(SDS/Floor): SDS   Expected length of stay:   Procedure length time (please enter amount you are requesting): 1 hour   Recovery time (patients always ask):   Medical Clearance: (Y/N)   Post- Op f/u appt time frame:     Pre-op orders (choose one)   X Use [facility] procedure driven order set in addition to anesthesia protocol   Use [facility] surgeon's personalized order set   Surgeon to enter pre-op orders   No pre-op orders   Use the prophylactic antibiotic protocol   No pre-op antibiotic orders indicated do not give antibiotic, if any, listed on the procedure driven order sets or personalized order sets     PCN allergy Yes___ or No___   If Yes: Proceed with PCN/Cephalosporin recommended antibiotic as benefit outweighs risk   Proceed with PCN/Cephalosporin recommended antibiotic as not a true allergy   Proceed with recommended alternative antibiotic for PCN allergy         ALL Medicaid/including BCBS community: Tubal/ Hyst form MUST be signed (30 days):       Message to nurses:

## 2024-04-30 NOTE — TELEPHONE ENCOUNTER
Please schedule the following surgery:     Procedure: hysteroscopy/d&c   Assist: (Y/N or none) none   Date:   5/10/2024                                   Time Requested:   Dx: postmenopausal bleeding   Pre-op appt: (Y/N or n/a) no   Admission type: (IN/OUT/OBVS) outpt   Department of discharge(SDS/Floor): SDS   Expected length of stay:   Procedure length time (please enter amount you are requesting): 1 hour   Recovery time (patients always ask):   Medical Clearance: (Y/N)   Post- Op f/u appt time frame:     Pre-op orders (choose one)   X Use [facility] procedure driven order set in addition to anesthesia protocol   Use [facility] surgeon's personalized order set   Surgeon to enter pre-op orders   No pre-op orders   Use the prophylactic antibiotic protocol   No pre-op antibiotic orders indicated do not give antibiotic, if any, listed on the procedure driven order sets or personalized order sets     PCN allergy Yes___ or No___   If Yes: Proceed with PCN/Cephalosporin recommended antibiotic as benefit outweighs risk   Proceed with PCN/Cephalosporin recommended antibiotic as not a true allergy   Proceed with recommended alternative antibiotic for PCN allergy         ALL Medicaid/including BCBS community: Tubal/ Hyst form MUST be signed (30 days):       Message to nurses:

## 2024-04-30 NOTE — TELEPHONE ENCOUNTER
Spoke to patient to confirm cancellation.  Stated her brother is currently not doing well and would like to postpone to early June if possible.    Patient is flexible w/ date time. Informed patient that I would reach out to provider to determine new date/time, and would resend a confirmation message via [employer] when rescheduled procedure is posted.    Patient agreed and verbalized understanding.

## 2024-04-30 NOTE — TELEPHONE ENCOUNTER
Procedure change request sent. Rescheduled to 6/6, ok per LC.    Patient notified via myChart sent on 4/30/24 (procedure).

## 2024-04-30 NOTE — TELEPHONE ENCOUNTER
Requested Prescriptions     Pending Prescriptions Disp Refills    LORAZEPAM 1 MG Oral Tab [Pharmacy Med Name: LORAZEPAM 1MG TABLETS] 90 tablet 0     Sig: TAKE 1 TABLET(1 MG) BY MOUTH THREE TIMES DAILY     LAST REFILL DATE 2/9/24   QUANTITY REQUESTED    DAY SUPPLY    DIAGNOSIS    LAST OFFICE VISIT  4/19/24   FOLLOW UP DUE      Future Appointments   Date Time Provider Department Center   5/1/2024 11:15 AM [facility]   5/14/2024  8:30 AM [facility]   5/20/2024 10:30 AM Rosmery Morfin MD [facility]  [facility]

## 2024-05-01 ENCOUNTER — OFFICE VISIT (OUTPATIENT)
Dept: NEUROLOGY | Facility: CLINIC | Age: 69
End: 2024-05-01
Payer: MEDICARE

## 2024-05-01 ENCOUNTER — MED REC SCAN ONLY (OUTPATIENT)
Dept: NEUROLOGY | Facility: CLINIC | Age: 69
End: 2024-05-01

## 2024-05-01 DIAGNOSIS — M54.81 OCCIPITAL NEURALGIA OF LEFT SIDE: Primary | ICD-10-CM

## 2024-05-01 PROCEDURE — 64405 NJX AA&/STRD GR OCPL NRV: CPT | Performed by: OTHER

## 2024-05-01 RX ORDER — TRIAMCINOLONE ACETONIDE 40 MG/ML
40 INJECTION, SUSPENSION INTRA-ARTICULAR; INTRAMUSCULAR ONCE
Status: COMPLETED | OUTPATIENT
Start: 2024-05-01 | End: 2024-05-01

## 2024-05-01 RX ORDER — ONDANSETRON 4 MG/1
4 TABLET, FILM COATED ORAL EVERY 8 HOURS PRN
Qty: 30 TABLET | Refills: 3 | Status: SHIPPED | OUTPATIENT
Start: 2024-05-01

## 2024-05-01 RX ORDER — LIDOCAINE HYDROCHLORIDE 10 MG/ML
2 INJECTION, SOLUTION INFILTRATION; PERINEURAL ONCE
Status: COMPLETED | OUTPATIENT
Start: 2024-05-01 | End: 2024-05-01

## 2024-05-01 RX ORDER — LORAZEPAM 1 MG/1
1 TABLET ORAL 3 TIMES DAILY
Qty: 90 TABLET | Refills: 0 | OUTPATIENT
Start: 2024-05-01

## 2024-05-01 NOTE — PROCEDURES
Procedure Note: Left Greater Occipital Nerve Block    Indications:  Severe left occipital pain    Informed consent was obtained (explaining the procedure and risks and benefits of procedure) from patient:  the signed consent form was placed in the medical record.    A time out was completed, verifying correct patient, procedure,site, positioning, and implants or special equipment.    Patient's left occipital area was palpated to identify location of greater occipital nerve. Alcohol was applied topically to the skin. Using a 27 gauge needle (aspirating during insertion), 1 cc of a mixture of Kenalog, 2 cc of 1% lidocaine was injected on the left side (directing needle to center, left and right of painful focus). Pressure with a gauze pad was held briefly upon the site of puncture to minimize bleeding and to further spread anaesthetic subcutaneously.     Additional left-sided supraorbital nerve block was done.  Trigger point injections over the left temporalis muscles were done..    There were no complications.  Patient was comfortable and left without complaint.

## 2024-05-02 ENCOUNTER — TELEPHONE (OUTPATIENT)
Dept: NEUROLOGY | Facility: CLINIC | Age: 69
End: 2024-05-02

## 2024-05-02 DIAGNOSIS — H93.13 TINNITUS OF BOTH EARS: Primary | ICD-10-CM

## 2024-05-02 NOTE — TELEPHONE ENCOUNTER
Not quite sure what she refers to, since I do not trat possible tinnitus.  I will order MRI of the brain with IAC w/w/o.  She possibly needs to see an ENT physician for assessment of possible tinnitus

## 2024-05-02 NOTE — TELEPHONE ENCOUNTER
Phone call returned to pt. Pt states that she has one more concern about her MRI order.   Pt states that she has been developing symptoms and an essential tremor.   Pt states that since her appointment on 04/02/24, pt states that in her left ear she started to get \"the sound of crickets\" in addition to her tinnitus.   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

## 2024-05-02 NOTE — TELEPHONE ENCOUNTER
Pt called asking to speak with the clinical team in regards to the order for the MRI. Please advise

## 2024-05-03 ENCOUNTER — TELEPHONE (OUTPATIENT)
Dept: PHYSICAL MEDICINE AND REHAB | Facility: CLINIC | Age: 69
End: 2024-05-03

## 2024-05-03 NOTE — TELEPHONE ENCOUNTER
Pt phoned with questions re: protocols on receiving a prescription for pain medication.   Pt requests a call back at [phone]

## 2024-05-06 ENCOUNTER — HOSPITAL ENCOUNTER (EMERGENCY)
Facility: HOSPITAL | Age: 69
Discharge: HOME | End: 2024-05-06
Attending: EMERGENCY MEDICINE
Payer: MEDICARE

## 2024-05-06 ENCOUNTER — PATIENT MESSAGE (OUTPATIENT)
Dept: PHYSICAL MEDICINE AND REHAB | Facility: CLINIC | Age: 69
End: 2024-05-06

## 2024-05-06 ENCOUNTER — TELEPHONE (OUTPATIENT)
Dept: NEUROLOGY | Facility: CLINIC | Age: 69
End: 2024-05-06

## 2024-05-06 VITALS
DIASTOLIC BLOOD PRESSURE: 74 MMHG | WEIGHT: 138 LBS | HEIGHT: 64 IN | HEART RATE: 71 BPM | TEMPERATURE: 98 F | BODY MASS INDEX: 23.56 KG/M2 | RESPIRATION RATE: 21 BRPM | SYSTOLIC BLOOD PRESSURE: 125 MMHG | OXYGEN SATURATION: 100 %

## 2024-05-06 DIAGNOSIS — M51.9 LUMBAR DISC DISEASE: ICD-10-CM

## 2024-05-06 DIAGNOSIS — M54.16 LUMBAR RADICULOPATHY: Primary | ICD-10-CM

## 2024-05-06 DIAGNOSIS — G43.709 CHRONIC MIGRAINE WITHOUT AURA WITHOUT STATUS MIGRAINOSUS, NOT INTRACTABLE: Primary | ICD-10-CM

## 2024-05-06 PROCEDURE — 99284 EMERGENCY DEPT VISIT MOD MDM: CPT

## 2024-05-06 PROCEDURE — 96374 THER/PROPH/DIAG INJ IV PUSH: CPT

## 2024-05-06 PROCEDURE — 96361 HYDRATE IV INFUSION ADD-ON: CPT

## 2024-05-06 RX ORDER — ACETAMINOPHEN 500 MG
1000 TABLET ORAL ONCE
Status: COMPLETED | OUTPATIENT
Start: 2024-05-06 | End: 2024-05-06

## 2024-05-06 RX ORDER — PROCHLORPERAZINE EDISYLATE 5 MG/ML
10 INJECTION INTRAMUSCULAR; INTRAVENOUS ONCE
Status: COMPLETED | OUTPATIENT
Start: 2024-05-06 | End: 2024-05-06

## 2024-05-06 NOTE — TELEPHONE ENCOUNTER
Pt called asking to speak with clinical team due to having severe migraines. Pt was last seen on 5/1 and stated she had a \"weekend from hell\". She was hoping to be seen sooner than her next scheduled apt which is 7/9

## 2024-05-06 NOTE — TELEPHONE ENCOUNTER
Considering her new symptoms she should be seen in emergency room.  Otherwise we can double book her on Wednesday

## 2024-05-06 NOTE — TELEPHONE ENCOUNTER
Phone call returned to pt.  Pt states that since the migraine cocktail, she has had no improvement. Pt had her occipital nerve block on 5/1/24. She states that this helped her scalp pain, not with the migraine pain. Pt states that Friday she started to have a migraine that is persisting, she has taken imitrex for pain relief. Pt was in bed all day Saturday, and then felt completely wiped out yesterday, but was in pain again. She is having some photophobia. Pt states that she would like to come in sooner for an appointment to discuss different treatment plan. Pt has new facial parasthesias in her cheek, the heat from the stove or when her hair brushes against it. RN scheduled pt for an in office evaluation and advised pt that with new migraine symptoms and intensity of pain, that she should be seen in the ER. Pt states that she will think about it.

## 2024-05-06 NOTE — DISCHARGE INSTRUCTIONS
--Return for worsening symptoms or any other concerns as we discussed including the following but not limited to: Worsening headache, change in vision or hearing, difficulty ambulating, extremity sensation or strength changes, difficulty speaking  --Rest, instructions for home care as discussed  --Continue outpatient migraine therapy as prescribed

## 2024-05-06 NOTE — ED INITIAL ASSESSMENT (HPI)
Patient reports migraines x 6 weeks. Brain scan scheduled for June. Patient currently sees neurology who sent her here.

## 2024-05-06 NOTE — ED PROVIDER NOTES
Patient Seen in: [facility] Emergency Department      History     Chief Complaint   Patient presents with    Headache     Stated Complaint: headache, increased eye pain,    Subjective:   HPI    68-year-old female presenting to the emergency department for ongoing headache.  Patient with past medical history notable for migraines, lupus, anxiety, Sjogren syndrome, trigeminal neuralgia, GERD.    Patient reports headache has been ongoing for approximately 6 weeks.  States that she has seen neurology recently for symptoms.  Had injections given by neurology in office.  Prescribed Reglan, Imitrex and steroids as well. Reports some help with symptoms. States that headache symptoms wax and wane. Does not detail sudden onset headache when symptoms initially started.  Does not detail any falls or trauma.  States that medications have helped symptoms intermittently but pain has never resolved fully.  Patient does not detail any falls or trauma.      Does not detail difficulty ambulating, neck or back pain.  Does not detail extremity sensation or strength changes.  Does not detail chest pain or shortness of breath.  Does not detail change in vision or hearing.    Objective:   Past Medical History:    Arthritis    Autoimmune disorder (HCC)    Lupus    Cervical disc displacement    C4-C5, C5-C6 disc displacement    Chronic pain    Right foot after 2020 foot surgery    Depression    Daughter  (adopted)    Difficulty urinating    Hiatal hernia with gastroesophageal reflux    Hypothyroidism    Infertility    Interstitial cystitis    Lupus (HCC)    Migraine    Migraines    Peripheral neuropathy    Reactive depression (situational)    when daughter     Scalp psoriasis    SI (sacroiliac) joint dysfunction    Sjogren's disease (HCC)    Traumatic brain injury (HCC)    Tremor    Trigeminal neuralgia pain    Vaginal stricture    Vasculitis (HCC)    nose and sinus    Vertigo              Past Surgical History:   Procedure  Laterality Date    Arthroscopy, shoulder, surgical; w/rotator cuff repair Right 2012    subclavian reconstruction      Biopsy of skin lesion  09/2020    Cataract extraction Bilateral 2021    Colonoscopy  2012    Correct bunion,simple Right 2004    Egd  2012    Foot fracture surgery Right 2020    Other surgical history  2005    heel: sural nerve neuroma excision -- achilles tendon accidentally injured during surgery                Social History     Socioeconomic History    Marital status: Single    Number of children: 0   Occupational History    Occupation: psychiatrist     Comment: retired   Tobacco Use    Smoking status: Never    Smokeless tobacco: Never   Vaping Use    Vaping status: Never Used   Substance and Sexual Activity    Alcohol use: Not Currently     Comment: rare    Drug use: Never    Sexual activity: Not Currently   Other Topics Concern    Blood Transfusions No    Caffeine Concern Yes     Comment: tea, 1 cup   Social History Narrative    Lives alone    Feels safe    No hx of abuse     Social Determinants of Health     Financial Resource Strain: Not At Risk (7/6/2022)    Received [facility], [facility]    Financial Resource Strain     How hard is it for you to pay for the very basics like food, housing, medical care, and heating?: Not hard at all   Food Insecurity: No Food Insecurity (7/6/2022)    Received from [facility], [facility]    Food Insecurity     Currently or in the past 3 months, have you worried your food would run out before you had money to buy more?: No     In the past 12 months, have you run out of food or been unable to get more?: No   Transportation Needs: Unmet Transportation Needs (7/6/2022)    Received from [facility], [facility]    Transportation Needs     Medical Transportation Needs?: Yes     Daily Living Transportation Needs? [Peds Only] : Yes   Social  Connections: Not At Risk (7/6/2022)    [facility]    Social Connections     In a typical week, how many times do you talk on the phone with family, friends, or neighbors?: Once a week    [facility]    Housing Stability              Review of Systems    Positive for stated complaint: headache, increased eye pain,  Other systems are as noted in HPI.  Constitutional and vital signs reviewed.      All other systems reviewed and negative except as noted above.    Physical Exam     ED Triage Vitals [05/06/24 1450]   /79   Pulse 90   Resp 16   Temp 97.6 °F (36.4 °C)   Temp src    SpO2 99 %   O2 Device None (Room air)       Current:/74   Pulse 71   Temp 97.6 °F (36.4 °C)   Resp 21   Ht 162.6 cm (5' 4\")   Wt 62.6 kg   SpO2 100%   BMI 23.69 kg/m²       Physical Exam:   /74   Pulse 71   Temp 97.6 °F (36.4 °C)   Resp 21   Ht 162.6 cm (5' 4\")   Wt 62.6 kg   SpO2 100%   BMI 23.69 kg/m²  - I reviewed these vital signs    Constitutional: Pt is well appearing, in no distress  HEENT: Normocephalic/Atraumatic, PERRL, EOM grossly normal, Conjunctiva Clear, MMM without Erythema or Lesions, Uvula midline, No Palatal/Oropharyngeal Erythema/Lesions noted  Neck: Range of motion intact, no midline tenderness to palpation  Lungs: CTA B, No crepitus, Talking in full sentences in no respiratory distress  Cardiovascular: RRR: yes  Back: No costo-vertebral angle discomfort on percussion, no point spinal ttp, no erythema or rash  Musculoskeletal: No deformities noted, No cyanosis/clubbing noted, No tenderness to palpation  Neurologic: A&O x 3, Speech clear, No facial asymmetry noted, DTR intact in UE and LE bilaterally, intact sensation throughout V1-3 distribution, Shoulder shrug equal bilaterally, midline tongue protrusion, equal audiotry capabilities and facial sensation bilaterally,  5/5 strength and sensation in UE and LE bilaterally, ambulatory capabilities intact without difficulty  Psychiatric: Mood and affect are anxious, Speech not pressured, Thought process is logical  Skin: Warm and dry, No rash    ED Course   Labs Reviewed - No data to display    MDM            Medical Decision Making  68-year-old female presenting to the emergency department due to persistent headache symptoms.    On arrival to the emergency department patient with stable vitals and in no acute distress.  Mildly anxious appearing tearful on exam.    Differential diagnosis includes the following but is not limited to: Tension type headache, migraine, do not suspect CVA as the patient has reassuring neuroexam with no focal neurologic deficits, do not suspect meningitis as the patient is afebrile and well-appearing with no stiffness or rigidity noted, do not suspect GCA as the patient does not have visual changes or pain.    Patient treated with fluid bolus, Tylenol and Compazine.  Benadryl not given to the patient as she did drive here.  Compazine infused into normal saline bolus.    On reevaluation patient reports symptoms improving.    At this time suspect tension type headache versus migraine as a cause of the patient's symptoms.    Advised patient to follow-up with her primary neurologist for further evaluation and workup.  Patient given strict return precautions including Volumen elements to: Worsening headache, change in vision or hearing, difficulty ambulating, extremity sensation or strength changes, difficulty speaking     Risk  Prescription drug management.        Disposition and Plan     Clinical Impression:  1. Chronic migraine without aura without status migrainosus, not intractable         Disposition:  Discharge  5/6/2024  5:04 pm    Follow-up:  [facility] Emergency Department  [address] [facility] [address]  [phone]  Follow up  As needed, If symptoms worsen    Joshua Irving  MD ALICIA  [address] [place] [id number]  [phone]    Schedule an appointment as soon as possible for a visit  For follow up    We recommend that you schedule follow up care with a primary care provider within the next three months to obtain basic health screening including reassessment of your blood pressure.      Medications Prescribed:  Discharge Medication List as of 5/6/2024  5:14 PM

## 2024-05-08 NOTE — TELEPHONE ENCOUNTER
Spoke with patient who stated she has been dealing with a migraine for 6 weeks that george not been responding to treatment. Patient is scheduled to see  on 5/10/24. Patient has also been having neck pain and she is wondering if this is contributing to her migraine.    Patient also had to stop all NSAIDS and last took Meloxicam 2 weeks ago due to uterine bleeding and is scheduled for an upcoming procedure.     Patient also stated she had a right hip injection yesterday 5/7/24 with Rush phong -     Patient has tried Tylenol, but this does not help. Patient stated it is painful to sit/drive.    Patient wanting to know if there is any medication she can try to help with the neck and back pain. Cannot currently take NSAIDs due to the above. Patient has tried Tramadol in the past, but this causes dizziness/nausea. She also has tried Gabapentin in the past, but she reported side effects of ataxia with this.     Follow up appointment scheduled for 5/29/24 for patient to review condition update post injection and review neck symptoms.    LOV: 4/22/24  NOV: 5/29/24  Next injection:  5/14/24 - Right L5 TFESI    Patient update forwarded on to  for possible pain medication recommendations.

## 2024-05-08 NOTE — TELEPHONE ENCOUNTER
From: Debbie Peterson  To: Horacio Sadler  Sent: 5/6/2024 9:04 AM CDT  Subject: PT    My Gyn surgery was rescheduled so I can start PT.  I have forgotten the name of the PT location near [place] you recommended. It was near [facility] and [facility]? I drove by and do not see it.  Sorry to bother you. DIANE Peterson

## 2024-05-09 ENCOUNTER — PATIENT OUTREACH (OUTPATIENT)
Dept: CASE MANAGEMENT | Age: 69
End: 2024-05-09

## 2024-05-09 NOTE — PROGRESS NOTES
1st attempt ER f/up apt request  No answer, [facility] to schedule apts  PCP -existing apt (8/27) for annual physical, unable to contact  NEURO -existing apt (5/10)  Closing encounter

## 2024-05-10 ENCOUNTER — OFFICE VISIT (OUTPATIENT)
Dept: NEUROLOGY | Facility: CLINIC | Age: 69
End: 2024-05-10
Payer: MEDICARE

## 2024-05-10 VITALS — DIASTOLIC BLOOD PRESSURE: 68 MMHG | SYSTOLIC BLOOD PRESSURE: 110 MMHG

## 2024-05-10 DIAGNOSIS — G43.709 CHRONIC MIGRAINE W/O AURA W/O STATUS MIGRAINOSUS, NOT INTRACTABLE: ICD-10-CM

## 2024-05-10 DIAGNOSIS — G25.0 ESSENTIAL TREMOR: Primary | ICD-10-CM

## 2024-05-10 PROCEDURE — 99214 OFFICE O/P EST MOD 30 MIN: CPT | Performed by: OTHER

## 2024-05-10 RX ORDER — PROPRANOLOL HYDROCHLORIDE 20 MG/1
TABLET ORAL
Qty: 360 TABLET | Refills: 3 | Status: SHIPPED | OUTPATIENT
Start: 2024-05-10

## 2024-05-10 NOTE — TELEPHONE ENCOUNTER
I agree that the headaches could very well be coming in part or totally from the cervical spine.  Since she is seeing Dr. Irving today, let us see what he suggests for this pain.

## 2024-05-10 NOTE — PROGRESS NOTES
[facility] [address], [address]  [facility] [address]  [phone]            Neurology follow-up visit     Referred By: Dr. Melton ref. provider found    Chief Complaint:   Chief Complaint   Patient presents with    Neurologic Problem     LOV 24  Pt still has fair amount of pain in her left eye, no Imitrex today, states she is feeling overall better. She would like to discuss medication plan.       HPI:     Debbie Peterson is a 68 year old female, who presents for history of migraines, tremors.  Patient has been seen by different physicians in the past, she was living in [place] at one time.  She was also followed with [facility] specialists.  She had history of trigeminal neuralgia but has improved since 2019.  That was on the right side.  However since at least  she has been developing migraines in the left side of the head, described as typical migraines, associate with throbbing sensation, light sensitive, nausea.    She had hard time breaking the cycle of those headaches.  Also history of tremors.  Mostly at the action and posture.  Does not affect her daily activities overall that much.  Her grandmother in her 90s also had history of tremors.      Nerve blocks were done at that time.    Patient came back for follow-up in May 2024.  MRI of the brain was scheduled.    Headaches persisted, essentially daily basis, mostly behind the left eye, associated with light sensitivity, noise sensitivity, sometimes she cannot get out of bed.      Past Medical History:    Arthritis    Autoimmune disorder (HCC)    Lupus    Cervical disc displacement    C4-C5, C5-C6 disc displacement    Chronic pain    Right foot after 2020 foot surgery    Depression    Daughter  (adopted)    Difficulty urinating    Hiatal hernia with gastroesophageal reflux    Hypothyroidism    Infertility    Interstitial cystitis    Lupus (HCC)    Migraine    Migraines    Peripheral neuropathy     Reactive depression (situational)    when daughter     Scalp psoriasis    SI (sacroiliac) joint dysfunction    Sjogren's disease (HCC)    Traumatic brain injury (HCC)    Tremor    Trigeminal neuralgia pain    Vaginal stricture    Vasculitis (HCC)    nose and sinus    Vertigo       Past Surgical History:   Procedure Laterality Date    Arthroscopy, shoulder, surgical; w/rotator cuff repair Right 2012    subclavian reconstruction      Biopsy of skin lesion  2020    Cataract extraction Bilateral     Colonoscopy  2012    Correct bunion,simple Right     Egd      Foot fracture surgery Right     Other surgical history      heel: sural nerve neuroma excision -- achilles tendon accidentally injured during surgery       Social history:  History   Smoking Status    Never   Smokeless Tobacco    Never     History   Alcohol Use Not Currently     Comment: rare     History   Drug Use Unknown       Family History   Problem Relation Age of Onset    Hypertension Mother     Obesity Mother     Depression Mother     Dementia Father         dementia induced by MRSA sepsis (cause of death)    Infectious Disease Father         MRSA sepsis (cause of death)    Fibromyalgia Sister     Alcohol and Other Disorders Associated Brother         alcoholism    Depression Brother     Other (drug use) Brother     Breast Cancer Maternal Aunt 65    Depression Other         family h/o    Ovarian Cancer Neg     Colon Cancer Neg          Current Outpatient Medications:     LORazepam 1 MG Oral Tab, Take 1 tablet (1 mg total) by mouth 3 (three) times daily., Disp: 90 tablet, Rfl: 3    ondansetron (ZOFRAN) 4 mg tablet, Take 1 tablet (4 mg total) by mouth every 8 (eight) hours as needed for Nausea., Disp: 30 tablet, Rfl: 3    miSOPROStol 100 MCG Oral Tab, Take 2 tablets per vagina the night prior to the procedure., Disp: 2 tablet, Rfl: 0    propranolol 20 MG Oral Tab, Take 1 pill PRN before going out, Disp: 30 tablet, Rfl: 5     SUMAtriptan 20 MG/ACT Nasal Solution, 1 spray by Nasal route every 2 (two) hours as needed for Migraine., Disp: 1 each, Rfl: 11    SUMAtriptan Succinate (IMITREX) 100 MG Oral Tab, Take 1 tablet (100 mg total) by mouth every 2 (two) hours as needed for Migraine. Use at onset; repeat once after 2 HRS-ONLY 2 doses of any sumatriptan (nasal, oral, or injectable) IN 24 HR MAX., Disp: 27 tablet, Rfl: 3    Fluocinonide 0.05 % External Solution, Apply 1 Application  topically daily., Disp: 60 mL, Rfl: 0    omeprazole 20 MG Oral Capsule Delayed Release, Take 1 capsule (20 mg total) by mouth before breakfast., Disp: 90 capsule, Rfl: 0    azelastine 0.1 % Nasal Solution, 2 sprays by Nasal route 2 (two) times daily., Disp: 90 mL, Rfl: 1    betamethasone dipropionate 0.05 % External Lotion, Apply 2 mL topically daily. To scalp, Disp: 180 mL, Rfl: 0    famotidine 20 MG Oral Tab, Take 1 tablet (20 mg total) by mouth nightly. (Patient taking differently: Take 20 mg by mouth daily.), Disp: 90 tablet, Rfl: 1    ipratropium 0.06 % Nasal Solution, 2 sprays by Nasal route 4 (four) times daily., Disp: 42 mL, Rfl: 3    levothyroxine 150 MCG Oral Tab, Take 1 tablet (150 mcg total) by mouth before breakfast., Disp: 90 tablet, Rfl: 3    fluticasone-umeclidin-vilant (TRELEGY ELLIPTA) 100-62.5-25 MCG/ACT Inhalation Aerosol Powder, Breath Activated, Inhale 1 puff into the lungs daily., Disp: , Rfl:     Meloxicam 7.5 MG Oral Tab, , Disp: , Rfl:     Ivermectin 1 % External Cream, APPLY THIN LAYER TOPICALLY TO FACE EVERY DAY, Disp: , Rfl:     RHOFADE 1 % External Cream, Apply 1 Application topically every morning., Disp: , Rfl:     methotrexate 2.5 MG Oral Tab, Take 3 tablets (7.5 mg total) by mouth once a week., Disp: , Rfl:     Estradiol-Norethindrone Acet 0.5-0.1 MG Oral Tab, Take 1 tablet by mouth daily., Disp: 84 tablet, Rfl: 3    valACYclovir 500 MG Oral Tab, , Disp: , Rfl:     mometasone furoate 50 MCG/ACT Nasal Suspension, 1 spray by Nasal  route daily., Disp: 51 g, Rfl: 2    Ibuprofen (MOTRIN OR), Take 200 mg by mouth., Disp: , Rfl:     Azelaic Acid (FINACEA) 15 % External Gel, Apply topically daily., Disp: 1 each, Rfl: 0    Syringe, Disposable, (EASY GLIDE LUER LOCK SYRINGE) 1 ML Does not apply Misc, Use with sumatriptan daily prn., Disp: 25 each, Rfl: 0    Needle, Disp, 26G X 1/2\" Does not apply Misc, Use with Imitrex injection., Disp: 25 each, Rfl: 0    Syringe, Disposable, 1 ML Does not apply Misc, Use with sumatriptan injectable solution, Disp: 2 each, Rfl: 1    cholecalciferol 50 MCG (2000 UT) Oral Cap, Take 1 capsule (2,000 Units total) by mouth daily. Take 2 tablets daily, Disp: 90 capsule, Rfl: 0    Allergies   Allergen Reactions    Cyclobenzaprine OTHER (SEE COMMENTS)     Drowsy, nightmares    Gabapentin OTHER (SEE COMMENTS)     Upper and lower extremity ataxia and cognitive impairment     Hydroxychloroquine OTHER (SEE COMMENTS)     eye toxicity    Ropinirole OTHER (SEE COMMENTS)     Vertigo     Naproxen OTHER (SEE COMMENTS)    Sulfa Antibiotics HIVES    Trimethoprim HIVES    Biaxin [Clarithromycin] ITCHING    Sulfamethoxazole W/Trimethoprim ITCHING       ROS:   As in HPI, the rest of the 14 system review was done and was negative      Physical Exam:  Vitals:    05/10/24 0842   BP: 110/68       General: No apparent distress, well nourished, well groomed.  Head- Normocephalic, atraumatic  Eyes- No redness or swelling  ENT- Hearing intake, normal glutition  Neck- No masses or adenopathy  Cv: pulses were palpable and normal, no cyanosis or edema     Neurological:     Mental Status- Alert and oriented x3.  Normal attention span and concentration  Thought process intact  Memory intact- recent and remote  Mood intact  Fund of knowledge appropriate for education and age    Language intact including: comprehension, naming, repetition, vocabulary    Cranial Nerves:    VII. Face symmetric, no facial weakness  VIII. Hearing intact to whisper.  IX.  Pallet elevates symmetrically.  XI. Shoulder shrug is intact  XII. Tongue is midline    Motor Exam:  Muscle tone normal  No atrophy or fasciculations  Strength- upper extremities 5/5 proximally and distally                  Rapid alternating movements intact    Gait:  Normal posture  Normal physiologic      Labs:    Lab Results   Component Value Date    TSH 2.050 05/30/2023     Lab Results   Component Value Date    HDL 91 (H) 05/30/2023    LDL 88 05/30/2023    TRIG 80 05/30/2023     Lab Results   Component Value Date    HGB 13.8 02/21/2024    HCT 40.6 02/21/2024    MCV 95.5 02/21/2024    WBC 6.1 02/21/2024    .0 02/21/2024      Lab Results   Component Value Date    BUN 25 (H) 02/21/2024    CA 9.3 02/21/2024    ALT 14 02/21/2024    AST 16 02/21/2024    ALKPHOS 47 09/23/2014    ALB 4.3 02/21/2024     02/21/2024    K 3.6 02/21/2024     02/21/2024    CO2 27.0 02/21/2024      I have reviewed labs.      Assessment   1. Essential tremor  Patient with possible essential tremors versus exaggerated physiological tremors.  At this point they are not very severe.      2. Chronic migraine w/o aura w/o status migrainosus, not intractable  Patient with episodic migraines, to be persisting.  Therefore at this point we will start her on propranolol, slowly tapering of the dose, suspect with since it might help her tremors as well.    3. Migraine without aura and with status migrainosus, not intractable    - SUMAtriptan Succinate (IMITREX) 100 MG Oral Tab; Take 1 tablet (100 mg total) by mouth every 2 (two) hours as needed for Migraine. Use at onset; repeat once after 2 HRS-ONLY 2 doses of any sumatriptan (nasal, oral, or injectable) IN 24 HR MAX.  Dispense: 27 tablet; Refill: 3           Education and counseling provided to patient. Instructed patient to call my office or seek medical attention immediately if symptoms worsen.  Patient verbalized understanding of information given. All questions were answered. All  side effects of drugs were discussed.       Return to clinic in: No follow-ups on file.    Joshua Irving MD

## 2024-05-13 NOTE — TELEPHONE ENCOUNTER
Per  at LOV 5/10/24:   \"Assessment  1. Essential tremor  Patient with possible essential tremors versus exaggerated physiological tremors.  At this point they are not very severe.       2. Chronic migraine w/o aura w/o status migrainosus, not intractable  Patient with episodic migraines, to be persisting.  Therefore at this point we will start her on propranolol, slowly tapering of the dose, suspect with since it might help her tremors as well.     3. Migraine without aura and with status migrainosus, not intractable     - SUMAtriptan Succinate (IMITREX) 100 MG Oral Tab; Take 1 tablet (100 mg total) by mouth every 2 (two) hours as needed for Migraine. Use at onset; repeat once after 2 HRS-ONLY 2 doses of any sumatriptan (nasal, oral, or injectable) IN 24 HR MAX.  Dispense: 27 tablet; Refill: 3\"    Update forwarded on to  to see if there are any additional recommendations.

## 2024-05-17 ENCOUNTER — TELEPHONE (OUTPATIENT)
Dept: NEUROLOGY | Facility: CLINIC | Age: 69
End: 2024-05-17

## 2024-05-17 NOTE — TELEPHONE ENCOUNTER
Phone call returned to pt. Advised on Dr. Irving message. Pt wishes to schedule a follow up to discuss other medications. Pt scheduled.

## 2024-05-17 NOTE — TELEPHONE ENCOUNTER
If the rash started concurrently with starting propranolol, then we will have to stop it.  Otherwise she probably needs to reach out to the dermatologist or PCP to rule out any other problems with her rash

## 2024-05-17 NOTE — TELEPHONE ENCOUNTER
Phone call returned to pt.   Pt states that she started to have a reaction on her face and neck. Blood red and hot, on her cheeks, neck and face. Pt states that this was happening on and off all day, the extreme painful redness lasts 45-60 minutes. Pt describes the pain like a very bad sunburn. Pt states that there are no hives, she states that she has rosacea. Pt states that she is having no airway concerns, she can breathe and swallow fine.   Pt states that she started the propranolol 20 mg 1 daily for the first week and three days ago started the 2 pills BID. Pt is waking up in the middle of the night with migraines, they are not as persistent.

## 2024-05-18 NOTE — TELEPHONE ENCOUNTER
I will see how she does with the medications that Dr. Irving has prescribed and see how she does with the right L5 TFESI and the PT for the lumbars spine over the next 2-3 weeks.

## 2024-05-20 ENCOUNTER — OFFICE VISIT (OUTPATIENT)
Dept: OBGYN CLINIC | Facility: CLINIC | Age: 69
End: 2024-05-20

## 2024-05-20 VITALS
SYSTOLIC BLOOD PRESSURE: 118 MMHG | DIASTOLIC BLOOD PRESSURE: 68 MMHG | WEIGHT: 136.38 LBS | HEIGHT: 64 IN | BODY MASS INDEX: 23.28 KG/M2

## 2024-05-20 DIAGNOSIS — N95.0 PMB (POSTMENOPAUSAL BLEEDING): Primary | ICD-10-CM

## 2024-05-20 RX ORDER — ESTRADIOL AND NORETHINDRONE ACETATE .5; .1 MG/1; MG/1
1 TABLET ORAL DAILY
Qty: 84 TABLET | Refills: 3 | Status: SHIPPED | OUTPATIENT
Start: 2024-05-20

## 2024-05-20 NOTE — PROGRESS NOTES
CC: Patient is here for FU    HPI: Patient is a 68 year old  for FU. Patient is on HRT and had usn 3/2023 which showed EM stripe. She initially was not having any vaginal bleeding, but later did. However, she has not had any vaginal bleeding for months.       No LMP recorded. Patient is postmenopausal.    OB History    Para Term  AB Living   3       3     SAB IAB Ectopic Multiple Live Births   2   1          # Outcome Date GA Lbr Alejandro/2nd Weight Sex Type Anes PTL Lv   3 Ectopic               Birth Comments: occurred while in [place] -- no surgery No blood transfusion   2 SAB            1 SAB               Obstetric Comments   Pt cannot recall how many sab had; had IVF       GYN hx:       LPS:    Past Medical History:    Arthritis    Autoimmune disorder (HCC)    Lupus    Cervical disc displacement    C4-C5, C5-C6 disc displacement    Chronic pain    Right foot after  foot surgery    Depression    Daughter  (adopted)    Difficulty urinating    Hiatal hernia with gastroesophageal reflux    Hypothyroidism    Infertility    Interstitial cystitis    Lupus (HCC)    Migraine    Migraines    Peripheral neuropathy    Reactive depression (situational)    when daughter     Scalp psoriasis    SI (sacroiliac) joint dysfunction    Sjogren's disease (HCC)    Traumatic brain injury (HCC)    Tremor    Trigeminal neuralgia pain    Vaginal stricture    Vasculitis (HCC)    nose and sinus    Vertigo     Past Surgical History:   Procedure Laterality Date    Arthroscopy, shoulder, surgical; w/rotator cuff repair Right     subclavian reconstruction      Biopsy of skin lesion  2020    Cataract extraction Bilateral     Colonoscopy  2012    Correct bunion,simple Right     Egd  2012    Foot fracture surgery Right     Other surgical history      heel: sural nerve neuroma excision -- achilles tendon accidentally injured during surgery     Allergies   Allergen Reactions    Cyclobenzaprine OTHER  (SEE COMMENTS)     Drowsy, nightmares    Gabapentin OTHER (SEE COMMENTS)     Upper and lower extremity ataxia and cognitive impairment     Hydroxychloroquine OTHER (SEE COMMENTS)     eye toxicity    Ropinirole OTHER (SEE COMMENTS)     Vertigo     Naproxen OTHER (SEE COMMENTS)    Sulfa Antibiotics HIVES    Trimethoprim HIVES    Biaxin [Clarithromycin] ITCHING    Sulfamethoxazole W/Trimethoprim ITCHING     Family History   Problem Relation Age of Onset    Hypertension Mother     Obesity Mother     Depression Mother     Dementia Father         dementia induced by MRSA sepsis (cause of death)    Infectious Disease Father         MRSA sepsis (cause of death)    Fibromyalgia Sister     Alcohol and Other Disorders Associated Brother         alcoholism    Depression Brother     Other (drug use) Brother     Breast Cancer Maternal Aunt 65    Depression Other         family h/o    Ovarian Cancer Neg     Colon Cancer Neg      Social History     Socioeconomic History    Marital status: Single     Spouse name: Not on file    Number of children: 0    Years of education: Not on file    Highest education level: Not on file   Occupational History    Occupation: psychiatrist     Comment: retired   Tobacco Use    Smoking status: Never    Smokeless tobacco: Never   Vaping Use    Vaping status: Never Used   Substance and Sexual Activity    Alcohol use: Not Currently     Comment: rare    Drug use: Never    Sexual activity: Not Currently   Other Topics Concern     Service Not Asked    Blood Transfusions No    Caffeine Concern Yes     Comment: tea, 1 cup    Occupational Exposure Not Asked    Hobby Hazards Not Asked    Sleep Concern Not Asked    Stress Concern Not Asked    Weight Concern Not Asked    Special Diet Not Asked    Back Care Not Asked    Exercise Not Asked    Bike Helmet Not Asked    Seat Belt Not Asked    Self-Exams Not Asked   Social History Narrative    Lives alone    Feels safe    No hx of abuse     Social Determinants  of Health     Financial Resource Strain: Not At Risk (7/6/2022)    [facility]    Financial Resource Strain     How hard is it for you to pay for the very basics like food, housing, medical care, and heating?: Not hard at all   Food Insecurity: No Food Insecurity (7/6/2022)    [facility]    Food Insecurity     Currently or in the past 3 months, have you worried your food would run out before you had money to buy more?: No     In the past 12 months, have you run out of food or been unable to get more?: No   Transportation Needs: Unmet Transportation Needs (7/6/2022)    [facility]    Transportation Needs     Currently or in the past 3 months, has lack of transportation kept you from medical appointments, getting food or medicine, or providing care to a family member?: Not on file     Has the lack of transportation kept you from meetings, work, or from getting things needed for daily living?: Not on file     Medical Transportation Needs?: Yes     Daily Living Transportation Needs? [Peds Only] : Yes   Physical Activity: Not on file   Stress: Not on file   Social Connections: Not At Risk (7/6/2022)    [facility]    Social Connections     In a typical week, how many times do you talk on the phone with family, friends, or neighbors?: Once a week   Housing Stability: Low Risk  (7/15/2023)    [facility]    Housing Stability     Mortgage Payment Concerns?: Not on file     Number of Places Lived in the Last Year: Not on file     Unstable Housing?: Not on file       Medications reviewed. See active list.     /68   Ht 64\"   Wt 136 lb 6.4 oz (61.9 kg)   BMI 23.41 kg/m²       Exam:   GENERAL: well  developed, well nourished, in no apparent distress          A/P: Patient is 68 year old female     1. PMB (postmenopausal bleeding)  - Transvaginal US GYNE Only [24632]; Future    Last usn over a year ago. Will recheck usn   Rosmery Morfin MD

## 2024-05-20 NOTE — TELEPHONE ENCOUNTER
Spoke with patient and reviewed message below from . Patient understood and stated she is actually going to be following back up with  tomorrow as she had a reaction to the propranolol that was prescribed. Patient states after 3 days she developed a sun burn like rash on her face and neck.     Patient is also going to be following up with her dentist who she has seen in the past for her trigeminal neuralgia issues.     Patient will keep her 5/29/24 follow up appointment with  to review updates from both her dentist and .    Nothing further needed at this time.     Update forwarded on to .

## 2024-05-21 ENCOUNTER — OFFICE VISIT (OUTPATIENT)
Dept: NEUROLOGY | Facility: CLINIC | Age: 69
End: 2024-05-21

## 2024-05-21 VITALS — WEIGHT: 136 LBS | HEIGHT: 64 IN | BODY MASS INDEX: 23.22 KG/M2

## 2024-05-21 DIAGNOSIS — G25.0 ESSENTIAL TREMOR: Primary | ICD-10-CM

## 2024-05-21 DIAGNOSIS — M54.81 OCCIPITAL NEURALGIA OF LEFT SIDE: ICD-10-CM

## 2024-05-21 DIAGNOSIS — H93.13 TINNITUS OF BOTH EARS: ICD-10-CM

## 2024-05-21 DIAGNOSIS — G43.001 MIGRAINE WITHOUT AURA AND WITH STATUS MIGRAINOSUS, NOT INTRACTABLE: ICD-10-CM

## 2024-05-21 DIAGNOSIS — G43.709 CHRONIC MIGRAINE W/O AURA W/O STATUS MIGRAINOSUS, NOT INTRACTABLE: ICD-10-CM

## 2024-05-21 PROCEDURE — 99214 OFFICE O/P EST MOD 30 MIN: CPT | Performed by: OTHER

## 2024-05-21 RX ORDER — GALCANEZUMAB 120 MG/ML
120 INJECTION, SOLUTION SUBCUTANEOUS
Qty: 1 EACH | Refills: 11 | Status: SHIPPED | OUTPATIENT
Start: 2024-05-21 | End: 2025-05-21

## 2024-05-21 RX ORDER — RAMELTEON 8 MG/1
8 TABLET ORAL NIGHTLY
Qty: 90 TABLET | Refills: 1 | Status: SHIPPED | OUTPATIENT
Start: 2024-05-21

## 2024-05-21 NOTE — TELEPHONE ENCOUNTER
Please let her know that the PT place is [facility] in [place] which is at the [place] corner of the intersection of [address] and [address].

## 2024-05-21 NOTE — PROGRESS NOTES
[facility] [address], [address]  [facility] [address]  [phone]            Neurology follow-up visit     Referred By: Dr. Melton ref. provider found    Chief Complaint:   Chief Complaint   Patient presents with    Neurologic Problem     LOV 05/10/24  Pt still has fair amount of pain in her left eye, pt stated had a flare-up with Migraine that lasted 6 weeks also c/o lack of sleep. She would like to discuss medication plan pt stated had an allergy reaction to propranolol cussed burring and swelling and redness to face.     Migraine       HPI:     Debbie Peterson is a 68 year old female, who presents for history of migraines, tremors.  Patient has been seen by different physicians in the past, she was living in [place] at one time.  She was also followed with [facility] specialists.  She had history of trigeminal neuralgia but has improved since 2019.  That was on the right side.  However since at least 2014 she has been developing migraines in the left side of the head, described as typical migraines, associate with throbbing sensation, light sensitive, nausea.    She had hard time breaking the cycle of those headaches.  Also history of tremors.  Mostly at the action and posture.  Does not affect her daily activities overall that much.  Her grandmother in her 90s also had history of tremors.      Nerve blocks were done at that time.    Patient came back for follow-up in May 2024.  MRI of the brain was scheduled.    Headaches persisted, essentially daily basis, mostly behind the left eye, associated with light sensitivity, noise sensitivity, sometimes she cannot get out of bed.  Therefore propranolol was tried, with the hope of treatment of tremors and migraines at the same time.  However apparently she had a rash that was attributed to propranolol.  Therefore she stopped.  Rash has resolved.  However she continues with essentially daily headache, especially behind  the left eye.  She was worried frustrated by that fact.  She was also having difficulties falling asleep since headaches are preventing her getting good sleep.      Past Medical History:    Arthritis    Autoimmune disorder (HCC)    Lupus    Cervical disc displacement    C4-C5, C5-C6 disc displacement    Chronic pain    Right foot after 2020 foot surgery    Depression    Daughter  (adopted)    Difficulty urinating    Hiatal hernia with gastroesophageal reflux    Hypothyroidism    Infertility    Interstitial cystitis    Lupus (HCC)    Migraine    Migraines    Peripheral neuropathy    Reactive depression (situational)    when daughter     Scalp psoriasis    SI (sacroiliac) joint dysfunction    Sjogren's disease (HCC)    Traumatic brain injury (HCC)    Tremor    Trigeminal neuralgia pain    Vaginal stricture    Vasculitis (HCC)    nose and sinus    Vertigo       Past Surgical History:   Procedure Laterality Date    Arthroscopy, shoulder, surgical; w/rotator cuff repair Right     subclavian reconstruction      Biopsy of skin lesion  2020    Cataract extraction Bilateral     Colonoscopy  2012    Correct bunion,simple Right     Egd  2012    Foot fracture surgery Right     Other surgical history      heel: sural nerve neuroma excision -- achilles tendon accidentally injured during surgery       Social history:  History   Smoking Status    Never   Smokeless Tobacco    Never     History   Alcohol Use Not Currently     Comment: rare     History   Drug Use Unknown       Family History   Problem Relation Age of Onset    Hypertension Mother     Obesity Mother     Depression Mother     Dementia Father         dementia induced by MRSA sepsis (cause of death)    Infectious Disease Father         MRSA sepsis (cause of death)    Fibromyalgia Sister     Alcohol and Other Disorders Associated Brother         alcoholism    Depression Brother     Other (drug use) Brother     Breast Cancer Maternal Aunt 65     Depression Other         family h/o    Ovarian Cancer Neg     Colon Cancer Neg          Current Outpatient Medications:     Estradiol-Norethindrone Acet 0.5-0.1 MG Oral Tab, Take 1 tablet by mouth daily., Disp: 84 tablet, Rfl: 3    propranolol 20 MG Oral Tab, Start with 1 pill daily for 1 week, then 1 pill BID for another week, then 2 pills BID (Patient not taking: Reported on 5/21/2024), Disp: 360 tablet, Rfl: 3    LORazepam 1 MG Oral Tab, Take 1 tablet (1 mg total) by mouth 3 (three) times daily., Disp: 90 tablet, Rfl: 3    ondansetron (ZOFRAN) 4 mg tablet, Take 1 tablet (4 mg total) by mouth every 8 (eight) hours as needed for Nausea., Disp: 30 tablet, Rfl: 3    miSOPROStol 100 MCG Oral Tab, Take 2 tablets per vagina the night prior to the procedure., Disp: 2 tablet, Rfl: 0    propranolol 20 MG Oral Tab, Take 1 pill PRN before going out (Patient not taking: Reported on 5/21/2024), Disp: 30 tablet, Rfl: 5    SUMAtriptan 20 MG/ACT Nasal Solution, 1 spray by Nasal route every 2 (two) hours as needed for Migraine., Disp: 1 each, Rfl: 11    SUMAtriptan Succinate (IMITREX) 100 MG Oral Tab, Take 1 tablet (100 mg total) by mouth every 2 (two) hours as needed for Migraine. Use at onset; repeat once after 2 HRS-ONLY 2 doses of any sumatriptan (nasal, oral, or injectable) IN 24 HR MAX., Disp: 27 tablet, Rfl: 3    Fluocinonide 0.05 % External Solution, Apply 1 Application  topically daily., Disp: 60 mL, Rfl: 0    omeprazole 20 MG Oral Capsule Delayed Release, Take 1 capsule (20 mg total) by mouth before breakfast., Disp: 90 capsule, Rfl: 0    azelastine 0.1 % Nasal Solution, 2 sprays by Nasal route 2 (two) times daily., Disp: 90 mL, Rfl: 1    betamethasone dipropionate 0.05 % External Lotion, Apply 2 mL topically daily. To scalp, Disp: 180 mL, Rfl: 0    famotidine 20 MG Oral Tab, Take 1 tablet (20 mg total) by mouth nightly. (Patient taking differently: Take 1 tablet (20 mg total) by mouth daily.), Disp: 90 tablet, Rfl: 1     ipratropium 0.06 % Nasal Solution, 2 sprays by Nasal route 4 (four) times daily., Disp: 42 mL, Rfl: 3    levothyroxine 150 MCG Oral Tab, Take 1 tablet (150 mcg total) by mouth before breakfast., Disp: 90 tablet, Rfl: 3    fluticasone-umeclidin-vilant (TRELEGY ELLIPTA) 100-62.5-25 MCG/ACT Inhalation Aerosol Powder, Breath Activated, Inhale 1 puff into the lungs daily., Disp: , Rfl:     Meloxicam 7.5 MG Oral Tab, , Disp: , Rfl:     Ivermectin 1 % External Cream, APPLY THIN LAYER TOPICALLY TO FACE EVERY DAY, Disp: , Rfl:     RHOFADE 1 % External Cream, Apply 1 Application topically every morning., Disp: , Rfl:     methotrexate 2.5 MG Oral Tab, Take 3 tablets (7.5 mg total) by mouth once a week., Disp: , Rfl:     valACYclovir 500 MG Oral Tab, , Disp: , Rfl:     mometasone furoate 50 MCG/ACT Nasal Suspension, 1 spray by Nasal route daily., Disp: 51 g, Rfl: 2    Ibuprofen (MOTRIN OR), Take 200 mg by mouth., Disp: , Rfl:     Azelaic Acid (FINACEA) 15 % External Gel, Apply topically daily., Disp: 1 each, Rfl: 0    Syringe, Disposable, (EASY GLIDE LUER LOCK SYRINGE) 1 ML Does not apply Misc, Use with sumatriptan daily prn., Disp: 25 each, Rfl: 0    Needle, Disp, 26G X 1/2\" Does not apply Misc, Use with Imitrex injection., Disp: 25 each, Rfl: 0    Syringe, Disposable, 1 ML Does not apply Misc, Use with sumatriptan injectable solution, Disp: 2 each, Rfl: 1    cholecalciferol 50 MCG (2000 UT) Oral Cap, Take 1 capsule (2,000 Units total) by mouth daily. Take 2 tablets daily, Disp: 90 capsule, Rfl: 0    Allergies   Allergen Reactions    Cyclobenzaprine OTHER (SEE COMMENTS)     Drowsy, nightmares    Gabapentin OTHER (SEE COMMENTS)     Upper and lower extremity ataxia and cognitive impairment     Hydroxychloroquine OTHER (SEE COMMENTS)     eye toxicity    Ropinirole OTHER (SEE COMMENTS)     Vertigo     Naproxen OTHER (SEE COMMENTS)    Sulfa Antibiotics HIVES    Trimethoprim HIVES    Biaxin [Clarithromycin] ITCHING     Sulfamethoxazole W/Trimethoprim ITCHING       ROS:   As in HPI, the rest of the 14 system review was done and was negative      Physical Exam:  Vitals:    05/21/24 1426   Weight: 136 lb (61.7 kg)   Height: 64\"       General: No apparent distress, well nourished, well groomed.  Head- Normocephalic, atraumatic  Eyes- No redness or swelling  ENT- Hearing intake, normal glutition  Neck- No masses or adenopathy  Cv: pulses were palpable and normal, no cyanosis or edema     Neurological:     Mental Status- Alert and oriented x3.  Normal attention span and concentration  Thought process intact  Memory intact- recent and remote  Mood intact  Fund of knowledge appropriate for education and age    Language intact including: comprehension, naming, repetition, vocabulary    Cranial Nerves:    VII. Face symmetric, no facial weakness  VIII. Hearing intact to whisper.  IX. Pallet elevates symmetrically.  XI. Shoulder shrug is intact  XII. Tongue is midline    Motor Exam:  Muscle tone normal  No atrophy or fasciculations  Strength- upper extremities 5/5 proximally and distally                  Rapid alternating movements intact    Gait:  Normal posture  Normal physiologic      Labs:    Lab Results   Component Value Date    TSH 2.050 05/30/2023     Lab Results   Component Value Date    HDL 91 (H) 05/30/2023    LDL 88 05/30/2023    TRIG 80 05/30/2023     Lab Results   Component Value Date    HGB 13.8 02/21/2024    HCT 40.6 02/21/2024    MCV 95.5 02/21/2024    WBC 6.1 02/21/2024    .0 02/21/2024      Lab Results   Component Value Date    BUN 25 (H) 02/21/2024    CA 9.3 02/21/2024    ALT 14 02/21/2024    AST 16 02/21/2024    ALKPHOS 47 09/23/2014    ALB 4.3 02/21/2024     02/21/2024    K 3.6 02/21/2024     02/21/2024    CO2 27.0 02/21/2024      I have reviewed labs.      Assessment   1. Essential tremor  Patient with possible essential tremors versus exaggerated physiological tremors.  At this point they are not very  severe.      2. Chronic migraine w/o aura w/o status migrainosus, not intractable    Patient had poor side effects with history of Botox, propranolol, topiramate, possible Depakote, amitriptyline, therefore at this point we will try Emgality as next trial.  To help improve quality sleep ramelteon will be used.    3. Migraine without aura and with status migrainosus, not intractable    - SUMAtriptan Succinate (IMITREX) 100 MG Oral Tab; Take 1 tablet (100 mg total) by mouth every 2 (two) hours as needed for Migraine. Use at onset; repeat once after 2 HRS-ONLY 2 doses of any sumatriptan (nasal, oral, or injectable) IN 24 HR MAX.  Dispense: 27 tablet; Refill: 3           Education and counseling provided to patient. Instructed patient to call my office or seek medical attention immediately if symptoms worsen.  Patient verbalized understanding of information given. All questions were answered. All side effects of drugs were discussed.       Return to clinic in: No follow-ups on file.    Joshua Irving MD

## 2024-05-21 NOTE — TELEPHONE ENCOUNTER
Faxed over the referral for PT to [facility] at fax #[phone]  and a copy was sent to the patient in [facility].

## 2024-05-22 ENCOUNTER — TELEPHONE (OUTPATIENT)
Dept: NEUROLOGY | Facility: CLINIC | Age: 69
End: 2024-05-22

## 2024-05-22 NOTE — TELEPHONE ENCOUNTER
Prior authorization requested on pt Emgality prescription.   Prior authorization questions answered by this RN in [facility].   Awaiting determination at this point.

## 2024-05-23 ENCOUNTER — ULTRASOUND ENCOUNTER (OUTPATIENT)
Dept: OBGYN CLINIC | Facility: CLINIC | Age: 69
End: 2024-05-23

## 2024-05-23 DIAGNOSIS — N95.0 PMB (POSTMENOPAUSAL BLEEDING): Primary | ICD-10-CM

## 2024-05-25 ENCOUNTER — TELEPHONE (OUTPATIENT)
Dept: OBGYN CLINIC | Facility: CLINIC | Age: 69
End: 2024-05-25

## 2024-05-25 NOTE — TELEPHONE ENCOUNTER
Called and dw pt usn showed that endometrial stripe is still thickened. Recommend hysteroscopy with excision of thickened tissue.     Patient is currently having SOB due to long Covid.I dw her that if her symptoms are still present 5 days prior to surgery, recommend delaying her surgery.

## 2024-05-29 ENCOUNTER — PATIENT MESSAGE (OUTPATIENT)
Dept: NEUROLOGY | Facility: CLINIC | Age: 69
End: 2024-05-29

## 2024-05-29 NOTE — TELEPHONE ENCOUNTER
I doubt it is caused by melatonin, possibly caused by Emgality.  Lets stop it.    Patient had side effects with Botox, propranolol, topiramate, Depakote, amitriptyline, Emgality.  We can consider starting memantine, it is usually well-tolerated and there is some evidence for it to be beneficial for migraines.

## 2024-05-29 NOTE — TELEPHONE ENCOUNTER
From: Debbie Peterson  To: Joshua Irving  Sent: 5/29/2024 7:25 AM CDT  Subject: Side Effects    I recently started Melatonin and Emgality(5/25/24)  - I am experiencing night sweats every night  -I am experiencing painful bowel cramping that results in a mix of diarrhea and firm stool.    Please advise.  Debbie HENRIQUEZ

## 2024-05-30 ENCOUNTER — TELEPHONE (OUTPATIENT)
Dept: OBGYN CLINIC | Facility: CLINIC | Age: 69
End: 2024-05-30

## 2024-05-30 DIAGNOSIS — N95.0 PMB (POSTMENOPAUSAL BLEEDING): Primary | ICD-10-CM

## 2024-05-30 NOTE — TELEPHONE ENCOUNTER
Regarding: Dyspnea and Pleuritic Pain  Contact: [phone]  ----- Message from Jolly Regan sent at 5/30/2024 11:36 AM CDT -----       ----- Message from Debbie Peterson to Rosmery Morfin MD sent at 5/30/2024 11:25 AM -----   I saw Dr Parekh- pulmonary  medicine.   I will be on oral prednisone for 3 wks.  in addition to the inhalers.  It is necessary for me to cancel the scheduled surgery. I will check back with you in 3-4 wks  I am sorry for the inconvenience.  Debbie VICKERS

## 2024-05-30 NOTE — TELEPHONE ENCOUNTER
Procedure Cancelled.   Patient notified via [employer] message.    See Telephone encounter 4/30/24 (Procedure).

## 2024-06-01 ENCOUNTER — PATIENT MESSAGE (OUTPATIENT)
Dept: PHYSICAL MEDICINE AND REHAB | Facility: CLINIC | Age: 69
End: 2024-06-01

## 2024-06-04 NOTE — TELEPHONE ENCOUNTER
Per  in response to patient's [employer] message, this is not something he usually treats.    Message sent to patient via [employer].

## 2024-06-04 NOTE — TELEPHONE ENCOUNTER
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 I have constant TMJ/pre-auricular pain since the last 2 months of treatment resistant L--sided migraine pain .  Thank you

## 2024-06-21 ENCOUNTER — TELEPHONE (OUTPATIENT)
Dept: NEUROLOGY | Facility: CLINIC | Age: 69
End: 2024-06-21

## 2024-06-21 DIAGNOSIS — G43.001 MIGRAINE WITHOUT AURA AND WITH STATUS MIGRAINOSUS, NOT INTRACTABLE: ICD-10-CM

## 2024-06-21 DIAGNOSIS — G43.709 CHRONIC MIGRAINE W/O AURA W/O STATUS MIGRAINOSUS, NOT INTRACTABLE: ICD-10-CM

## 2024-06-21 NOTE — TELEPHONE ENCOUNTER
Called [facility] to verify for refills on file, spoke with Cristela inquired about pt's refill request, Cristela advised that the pt picked both Sumatriptan medications on June 19, 2024 two refills left on both medications.

## 2024-06-23 ENCOUNTER — HOSPITAL ENCOUNTER (OUTPATIENT)
Age: 69
Discharge: HOME | End: 2024-06-23

## 2024-06-23 VITALS
OXYGEN SATURATION: 100 % | DIASTOLIC BLOOD PRESSURE: 72 MMHG | SYSTOLIC BLOOD PRESSURE: 128 MMHG | TEMPERATURE: 98 F | RESPIRATION RATE: 18 BRPM | HEART RATE: 96 BPM

## 2024-06-23 DIAGNOSIS — J01.40 ACUTE NON-RECURRENT PANSINUSITIS: ICD-10-CM

## 2024-06-23 DIAGNOSIS — H65.02 NON-RECURRENT ACUTE SEROUS OTITIS MEDIA OF LEFT EAR: Primary | ICD-10-CM

## 2024-06-23 PROCEDURE — 99213 OFFICE O/P EST LOW 20 MIN: CPT | Performed by: PHYSICIAN ASSISTANT

## 2024-06-23 RX ORDER — AMOXICILLIN AND CLAVULANATE POTASSIUM 875; 125 MG/1; MG/1
1 TABLET, FILM COATED ORAL 2 TIMES DAILY
Qty: 14 TABLET | Refills: 0 | Status: SHIPPED | OUTPATIENT
Start: 2024-06-23 | End: 2024-06-23

## 2024-06-23 RX ORDER — AMOXICILLIN AND CLAVULANATE POTASSIUM 875; 125 MG/1; MG/1
1 TABLET, FILM COATED ORAL 2 TIMES DAILY
Qty: 14 TABLET | Refills: 0 | Status: SHIPPED | OUTPATIENT
Start: 2024-06-23 | End: 2024-06-30

## 2024-06-23 NOTE — ED PROVIDER NOTES
Patient Seen in: [facility]      History     Chief Complaint   Patient presents with    Ear Problem Pain     Stated Complaint: Ear Infection    Subjective:   HPI    Patient is a 68-year-old female, presenting to immediate care for evaluation of intermittent left ear pain for approximately 1 month.  Symptoms worsening the last several days.  In addition has been having associated sinus pain and pressure for approximately 3 weeks.  No fevers.  No hearing loss, ringing in ears, or ear drainage.  No facial swelling.  No trismus or drooling.  No cough.  Had recent asthma exacerbation with recent use of prednisone.  She is concerned for possible underlying infection.     Objective:   No pertinent past medical history.            No pertinent past surgical history.              No pertinent social history.            Review of Systems   Constitutional:  Negative for fever.   HENT:  Positive for ear pain, sinus pressure and sinus pain. Negative for facial swelling, trouble swallowing and voice change.    Cardiovascular:  Negative for chest pain.   Musculoskeletal:  Negative for neck pain and neck stiffness.   Skin:  Negative for rash.   Neurological:  Negative for weakness.   Psychiatric/Behavioral:  Negative for confusion.    All other systems reviewed and are negative.      Positive for stated complaint: Ear Infection  Other systems are as noted in HPI.  Constitutional and vital signs reviewed.      All other systems reviewed and negative except as noted above.    Physical Exam     ED Triage Vitals [06/23/24 1158]   /72   Pulse 96   Resp 18   Temp 98.4 °F (36.9 °C)   Temp src Temporal   SpO2 100 %   O2 Device None (Room air)       Current Vitals:   Vital Signs  BP: 128/72  Pulse: 96  Resp: 18  Temp: 98.4 °F (36.9 °C)  Temp src: Temporal    Oxygen Therapy  SpO2: 100 %  O2 Device: None (Room air)            Physical Exam  Vitals and nursing note reviewed.   Constitutional:       General: She is not in  acute distress.     Appearance: Normal appearance. She is not ill-appearing, toxic-appearing or diaphoretic.   HENT:      Head: Normocephalic and atraumatic.      Ears:      Comments: Serous effusion with TM bulging.  Loss of cone of light.  Scant erythema.  Ear canal bilateral without swelling or redness or drainage.  Nontender external ear, tragus, mastoid.     Mouth/Throat:      Mouth: Mucous membranes are moist.   Eyes:      Conjunctiva/sclera: Conjunctivae normal.   Cardiovascular:      Rate and Rhythm: Normal rate.      Pulses: Normal pulses.   Pulmonary:      Effort: No respiratory distress.   Musculoskeletal:      Cervical back: Normal range of motion. No rigidity.   Neurological:      General: No focal deficit present.      Mental Status: She is alert.      Motor: No weakness.      Coordination: Coordination normal.      Gait: Gait normal.   Psychiatric:         Mood and Affect: Mood normal.         Behavior: Behavior normal.           ED Course   Labs Reviewed - No data to display           MDM     Differential diagnoses considered included, but are not exclusive of: Otitis media, externa, cerumen impaction, foreign body, TM perforation, ear effusion, eustachian tube dysfunction, viral illness, allergic rhinitis, sinusitis      Dx: Acute serous otitis media of left ear, initial encounter  Dx: Acute pansinusitis, nonrecurring, initial encounter  Overall well-appearing  Hemodynamically stable  Afebrile  Tolerating PO  Outpatient management  Supportive care  Amoxicillin twice daily for 7 days for acute otitis media  PCP follow  Return precaution  Discharge instructions otitis media                                     Medical Decision Making      Disposition and Plan     Clinical Impression:  1. Non-recurrent acute serous otitis media of left ear    2. Acute non-recurrent pansinusitis         Disposition:  Discharge  6/23/2024 12:19 pm    Follow-up:  Andrea Burnham  [address]  [facility] [address]  [place]  [place] [address]  [phone]                Medications Prescribed:  Current Discharge Medication List        START taking these medications    Details   amoxicillin clavulanate 875-125 MG Oral Tab Take 1 tablet by mouth 2 (two) times daily for 7 days.  Qty: 14 tablet, Refills: 0

## 2024-06-23 NOTE — ED INITIAL ASSESSMENT (HPI)
Pt here with complaints of left ear pain for 1 month along with sinus pressure for 3 weeks , pt denies any fevers or sob

## 2024-06-29 ENCOUNTER — PATIENT MESSAGE (OUTPATIENT)
Dept: PHYSICAL MEDICINE AND REHAB | Facility: CLINIC | Age: 69
End: 2024-06-29

## 2024-07-01 ENCOUNTER — PATIENT MESSAGE (OUTPATIENT)
Dept: PHYSICAL MEDICINE AND REHAB | Facility: CLINIC | Age: 69
End: 2024-07-01

## 2024-07-01 NOTE — TELEPHONE ENCOUNTER
From: Debbie Peterson  To: Horacio Sadler  Sent: 7/1/2024 9:36 AM CDT  Subject: Cervicogenic Headache Symptoms    Hi,   I understand Dr Sadler is very busy. If he is unable to get me in his calendar, could he refer me to a collegue in his group who performs cervical epidural injections/  Thank you. JH

## 2024-07-01 NOTE — TELEPHONE ENCOUNTER
Per Dr.Couri mccrary for sooner appointment. Appointment became available for 7/2/24 patient accepted appointment. Patient aware appointment is to evaluate neck symptoms and determine treatment plan/injection.    Patient was very appreciative. Appointment scheduled. Nothing further needed at this time.

## 2024-07-01 NOTE — TELEPHONE ENCOUNTER
From: Debbie Peterson  To: Horacio Sadler  Sent: 6/29/2024 7:41 AM CDT  Subject: Cervicogenic Headache Symptoms    Good Morning,   I have a follow-up appt. with you on Aug 5, 2024.   However, I would like to be scheduled for a steroid injection of the cervical spine ASAP, if you agree.    I have been experiencing L sided : neck pain, pain at the base of the skull, jaw pain, gum pain, ear pain, tinnitis, eye pain, temple pain, and sometimes pain on the top of my head for a month. I have consulted with ENT and the L ear is fine. I have orders for TMJ PT which I will pursue. However, I consulted with a oral-maxillofacial surgeon, who after exam and x-rays, did not feel I have TMJ pathology.   If I recall degenerative changes in my cervical spine were most significant on the L.   Ironically, I may have injured my neck, because on Emgality the L sided migraine pain pattern that I am so familiar with, was greatly reduced and I was gladly cleaning out closets, taking down boxes, lifting etc.   Also, I had an exacerbation of the hiatal hernia/GERD Sx's, after these activities and placed a 5\" wedge on my bed, which is very uncomfortable for my neck.   I will gladly come in for an appt., to review. You did have a discussion with me about a cervical injection at a previous appt. I have been struggling with this for a month now and would be happy to get on your procedure schedule. Thank you,  Debbie Peterson

## 2024-07-01 NOTE — TELEPHONE ENCOUNTER
LOV: 4/22/24  NOV: 8/5/24  Last injection: 5/14/24 - Right L5 transforaminal epidural steroid injection     Per  at LOV: \"I will address her cervical spine once the lumbar spine is doing better. \"    Message sent to  to advise.  Awaiting feedback.

## 2024-07-02 ENCOUNTER — OFFICE VISIT (OUTPATIENT)
Dept: PHYSICAL MEDICINE AND REHAB | Facility: CLINIC | Age: 69
End: 2024-07-02
Payer: MEDICARE

## 2024-07-02 VITALS — BODY MASS INDEX: 23.56 KG/M2 | HEIGHT: 64 IN | WEIGHT: 138 LBS

## 2024-07-02 DIAGNOSIS — M50.90 CERVICAL DISC DISEASE: ICD-10-CM

## 2024-07-02 DIAGNOSIS — M35.00 SJOGREN'S SYNDROME, WITH UNSPECIFIED ORGAN INVOLVEMENT (HCC): ICD-10-CM

## 2024-07-02 DIAGNOSIS — M32.9 LUPUS (HCC): ICD-10-CM

## 2024-07-02 DIAGNOSIS — G43.709 CHRONIC MIGRAINE W/O AURA W/O STATUS MIGRAINOSUS, NOT INTRACTABLE: ICD-10-CM

## 2024-07-02 DIAGNOSIS — K21.00 GASTROESOPHAGEAL REFLUX DISEASE WITH ESOPHAGITIS WITHOUT HEMORRHAGE: ICD-10-CM

## 2024-07-02 DIAGNOSIS — F41.9 ANXIETY: ICD-10-CM

## 2024-07-02 DIAGNOSIS — M54.12 CERVICAL RADICULOPATHY: Primary | ICD-10-CM

## 2024-07-02 PROCEDURE — 99214 OFFICE O/P EST MOD 30 MIN: CPT | Performed by: PHYSICAL MEDICINE & REHABILITATION

## 2024-07-02 RX ORDER — NALTREXONE 100 %
POWDER (GRAM) MISCELLANEOUS
Qty: 30 EACH | Refills: 3 | Status: SHIPPED | OUTPATIENT
Start: 2024-07-02

## 2024-07-02 NOTE — PROGRESS NOTES
[facility]    Chief Complaint:    Chief Complaint   Patient presents with    Neck Pain     LOV 04/22/2024 Pt presents with neck /face pain, her pain level is 4-5 /10. Pt is taking Tylenol for her pain and it doesn't help much. Pt states she has N/T in her left arm, cheek and jaw.      Nursing note reviewed and verified.    Patient was last seen on 4/22/2024.  She has a history of left sided migraine headaches which is now ontrolled with medication.  She was started on She also has a history of tinnitus on the left side with pain.  She has spoken to her dentist and neurologist about this.  She saw an ENT who did further imaging and told her that she does not have TMJ.  She has left ear pain and eye pain and left sided neck pain both anterior and posterior.  She has recently started to have right ear pain with tinnitus.  She developed vertigo about 9 days ago with a pounding in her head.  She went to urgent care and she was told that she had an effusion on the right.  She was started on Augmentin which did not help.  She saw an ENT and he told her that she did not have a ear infection.  She started to wonder if the pain was from her neck and therefore she called me. About 2 years ago, she developed left cervical radiculopathy with left arm weakness and neck pain.  The ENT gave her a HEP for TMJ which caused her to develop left arm tingling.        Description of the Pain  The pain is located in the left base of the skull.    The pain radiates to left scapula and left shoulder.  The pain at its best is 3/10. The pain at its worst is 7/10. The pain is currently  5/10.  The pain is described as a(n) dull sensation.    The patient reports no numbness.  The patient reports tingling in the left whole arm and hand when she was doing the TMJ exercises only.  There is not weakness in bilateral hands and arms.  The pain is worse looking down, when coughing, in the afternoon, in the evening, and talking .   The pain is  better with nothing.         Past Medical History   Past Medical History:    Arthritis    Autoimmune disorder (HCC)    Lupus    Cervical disc displacement    C4-C5, C5-C6 disc displacement    Chronic pain    Right foot after  foot surgery    Depression    Daughter  (adopted)    Difficulty urinating    Hiatal hernia with gastroesophageal reflux    Hypothyroidism    Infertility    Interstitial cystitis    Lupus (HCC)    Migraine    Migraines    Peripheral neuropathy    Reactive depression (situational)    when daughter     Scalp psoriasis    SI (sacroiliac) joint dysfunction    Sjogren's disease (HCC)    Traumatic brain injury (HCC)    Tremor    Trigeminal neuralgia pain    Vaginal stricture    Vasculitis (HCC)    nose and sinus    Vertigo       Past Surgical History   Past Surgical History:   Procedure Laterality Date    Arthroscopy, shoulder, surgical; w/rotator cuff repair Right     subclavian reconstruction      Biopsy of skin lesion  2020    Cataract extraction Bilateral     Colonoscopy  2012    Correct bunion,simple Right     Egd  2012    Foot fracture surgery Right     Other surgical history      heel: sural nerve neuroma excision -- achilles tendon accidentally injured during surgery       Family History   Family History   Problem Relation Age of Onset    Hypertension Mother     Obesity Mother     Depression Mother     Dementia Father         dementia induced by MRSA sepsis (cause of death)    Infectious Disease Father         MRSA sepsis (cause of death)    Fibromyalgia Sister     Alcohol and Other Disorders Associated Brother         alcoholism    Depression Brother     Other (drug use) Brother     Breast Cancer Maternal Aunt 65    Depression Other         family h/o    Ovarian Cancer Neg     Colon Cancer Neg        Social History   Social History     Socioeconomic History    Marital status: Single     Spouse name: Not on file    Number of children: 0    Years of education:  Not on file    Highest education level: Not on file   Occupational History    Occupation: psychiatrist     Comment: retired   Tobacco Use    Smoking status: Never    Smokeless tobacco: Never   Vaping Use    Vaping status: Never Used   Substance and Sexual Activity    Alcohol use: Not Currently     Comment: rare    Drug use: Never    Sexual activity: Not Currently   Other Topics Concern     Service Not Asked    Blood Transfusions No    Caffeine Concern Yes     Comment: tea, 1 cup    Occupational Exposure Not Asked    Hobby Hazards Not Asked    Sleep Concern Not Asked    Stress Concern Not Asked    Weight Concern Not Asked    Special Diet Not Asked    Back Care Not Asked    Exercise Not Asked    Bike Helmet Not Asked    Seat Belt Not Asked    Self-Exams Not Asked   Social History Narrative    Lives alone    Feels safe    No hx of abuse     Social Determinants of Health     Financial Resource Strain: Not At Risk (7/6/2022)    [facility]    Financial Resource Strain     How hard is it for you to pay for the very basics like food, housing, medical care, and heating?: Not hard at all   Food Insecurity: No Food Insecurity (7/6/2022)    [facility]    Food Insecurity     Currently or in the past 3 months, have you worried your food would run out before you had money to buy more?: No     In the past 12 months, have you run out of food or been unable to get more?: No   Transportation Needs: Unmet Transportation Needs (7/6/2022)    [facility]    Transportation Needs     Currently or in the past 3 months, has lack of transportation kept you from medical appointments, getting food or medicine, or providing care to a family member?: Not on file     Has the lack of transportation kept you from meetings, work, or from getting  things needed for daily living?: Not on file     Medical Transportation Needs?: Yes     Daily Living Transportation Needs? [Peds Only] : Yes   Physical Activity: Not on file   Stress: Not on file   Social Connections: Not At Risk (7/6/2022)    [facility]    Social Connections     In a typical week, how many times do you talk on the phone with family, friends, or neighbors?: Once a week   Housing Stability: Low Risk  (7/15/2023)    [facility] from [facility], [facility]    Housing Stability     Mortgage Payment Concerns?: Not on file     Number of Places Lived in the Last Year: Not on file     Unstable Housing?: Not on file       PE:  The patient does appear in her stated age in mild distress.  The patient is well groomed.    Psychiatric:  The patient is alert and oriented x 3.  The patient has a normal affect and mood.      Respiratory:  No acute respiratory distress. Patient does not have a cough.    HEENT:  Extraocular muscles are intact. There is no kern icterus. Pupils are equal, round, and reactive to light. No redness or discharge bilaterally.    Skin:  There are no rashes or lesions.    Lymph Nodes:  The patient has no palpable submandibular, supraclavicular, and cervical lymph nodes..    Vitals:  There were no vitals filed for this visit.    Cervical Spine:    Posture: moderate chin forward superiorly rotated protracted shoulder posture.   Shoulders: Level   Head: In neutral   Spinous Processes Palpations: Tender at C7   Z-Joints Palpations: Tender at  left OA, left C1-2, left C2-3, left C3-4, left C4-5, left C5-6, and left C6-7   Muscular Palpations: Tender at  left upper trapezius  left levator scapula(e)  left rhomboid   Cervical Flexion: 45 degrees Gives the patient pain in the left neck   Cervical Extension: 45 degrees Painless   RIGHT rotation: 70 degrees Painless   LEFT rotation: 70 degrees Painless      Vascular upper extremity:   Right radial pulses: 2+   Left radial pulses: 2+      Neurological Upper Extremity:    Light Touch: Intact in Bilateral UE except:  Increased in the left ring finger and left little finger.  Decreased in the left radial dorsal aspect of the hand, left thumb, left index finger, and left middle finger.   Pin Prick: Not tested.   UE Muscle Strength: All Upper Extremity strength measurements 5/5 except:  Triceps Left: 4/5  Serratus anterior Left: 4/5   Reflexes: 2+ In the bilateral upper extremities.   Escoto's sign Right: Negative   Escoto's sigh Left: Negative     Radiology Imaging:  I reviewed with the patient her MRI of the cervical spine from 3/19/2024.      Assessment  1. left C7 radiculopathy    2. Chronic migraine w/o aura w/o status migrainosus, not intractable    3. C6-7 left > right mild-mod diffuse, C5-6 mild-mod central, C4-5 mild central, C3-4 left mild foraminal bulging discs    4. Anxiety    5. Gastroesophageal reflux disease with esophagitis without hemorrhage    6. Sjogren's syndrome, with unspecified organ involvement (HCC)    7. Lupus (HCC)        Plan  I will do a left C7-T1 ILESI.    She will trial Naltrexone 1 mg a day for her post COVID symptoms.    If the injection helps her, then she will need to get back into the PT for the cervical spine.    The patient will follow up in 2-3 months, but the patient will call me 2 weeks after having the injection to let me know how the injection worked.    The patient understands and agrees with the stated plan.    Horacio Sadler MD  7/2/2024

## 2024-07-02 NOTE — PATIENT INSTRUCTIONS
Plan  I will do a left C7-T1 ILESI.    She will trial Naltrexone 1 mg a day for her post COVID symptoms.    If the injection helps her, then she will need to get back into the PT for the cervical spine.    The patient will follow up in 2-3 months, but the patient will call me 2 weeks after having the injection to let me know how the injection worked.

## 2024-07-03 ENCOUNTER — TELEPHONE (OUTPATIENT)
Dept: PHYSICAL MEDICINE AND REHAB | Facility: CLINIC | Age: 69
End: 2024-07-03

## 2024-07-03 DIAGNOSIS — M54.12 CERVICAL RADICULOPATHY: Primary | ICD-10-CM

## 2024-07-03 NOTE — TELEPHONE ENCOUNTER
Per CMS Guidelines -no authorization is required for Left C7-T1 interlaminar epidural steroid injection CPT 71017     Status: Authorization is not required based on medical necessity however is not a guarantee of payment and may be subject to review once claim is submitted-Covered Benefit

## 2024-07-05 NOTE — TELEPHONE ENCOUNTER
Patient has been scheduled for Left C7-T1 interlaminar epidural steroid injection on 7/19/24 at the [facility] with Dr. Sadler.   Anesthesia type:  Local  Please note: The [facility] will call the business day prior to discuss the exact time/arrival and additional instructions for your appointment.  Patient was advised that if he/she does receive the covid vaccine it needs to be at least 2 weeks before or after the injection.  Medications and allergies reviewed.  Educated to hold NSAIDS (Aleve, Ibuprofen, Motrin, Advil) and anti-inflammatories (Meloxicam, Naproxen, Diclofenac, Celebrex) and for cervical injections must hold Multi-Vitamins, Vitamin E, Fish Oil/Omega-3.  Patient informed of [facility]'s  policy:  he/she will need a  to and from procedure and must be on site for their entirety of their visit, if their ride is unable to the procedure will be cancelled.   [facility] is located in the [facility] 1st floor [address] [place] [address].   may park in the yellow/purple parking lot.  Patient verbalized understanding and agrees with plan.  Scheduled in Epic: Yes  Scheduled in Surgical Case: Yes  Follow up appointment made: NOV: 8/5/2024 Horacio Sadelr MD

## 2024-07-18 NOTE — TELEPHONE ENCOUNTER
LOV 05/21/24   NOV 08/06/24    Refill request for pt propranolol, reviewed by RN and routed to provider for review.

## 2024-07-19 RX ORDER — PROPRANOLOL HYDROCHLORIDE 20 MG/1
TABLET ORAL
Qty: 30 TABLET | Refills: 0 | Status: SHIPPED | OUTPATIENT
Start: 2024-07-19

## 2024-08-05 ENCOUNTER — OFFICE VISIT (OUTPATIENT)
Dept: PHYSICAL MEDICINE AND REHAB | Facility: CLINIC | Age: 69
End: 2024-08-05
Payer: MEDICARE

## 2024-08-05 VITALS — HEIGHT: 64 IN | WEIGHT: 138 LBS | BODY MASS INDEX: 23.56 KG/M2

## 2024-08-05 DIAGNOSIS — M32.9 LUPUS (HCC): ICD-10-CM

## 2024-08-05 DIAGNOSIS — M51.9 LUMBAR DISC DISEASE: Primary | ICD-10-CM

## 2024-08-05 DIAGNOSIS — M54.16 LUMBAR RADICULOPATHY: ICD-10-CM

## 2024-08-05 DIAGNOSIS — M50.90 CERVICAL DISC DISEASE: ICD-10-CM

## 2024-08-05 DIAGNOSIS — M54.2 NECK PAIN: ICD-10-CM

## 2024-08-05 DIAGNOSIS — F41.9 ANXIETY: ICD-10-CM

## 2024-08-05 DIAGNOSIS — M35.00 SJOGREN'S SYNDROME, WITH UNSPECIFIED ORGAN INVOLVEMENT (HCC): ICD-10-CM

## 2024-08-05 DIAGNOSIS — M16.0 PRIMARY OSTEOARTHRITIS OF BOTH HIPS: ICD-10-CM

## 2024-08-05 PROCEDURE — 99214 OFFICE O/P EST MOD 30 MIN: CPT | Performed by: PHYSICAL MEDICINE & REHABILITATION

## 2024-08-05 NOTE — PATIENT INSTRUCTIONS
Plan  She will get into the PT for the cervical and the lumbar spines.    She will resume the Naltrexone once she is ready.      She will follow up in 2-3 months or sooner if needed.

## 2024-08-05 NOTE — PROGRESS NOTES
Low Back Pain H & P    Chief Complaint:   Chief Complaint   Patient presents with    Follow - Up     LOV 24 pt is here for a follow up.  No N/T. Not taking any pain meds or muscle relaxer's. No current physical therapy. Pain 1/10     Nursing note reviewed and verified.    Patient was last seen on 2024.  On 2024, I did the right L5 TFESI which helped with the right buttock and low back pain, the right iliac crest pain, the right pubic area pain.  She still had some right groin pain and therefore she saw her sports medicine doctor who did a right hip injection which resolved the right groin pain.  On 2024, I did the left C7-T1 ILESI which has helped about 90% overall.  The left neck pain is much better.  The left ear, eye, gum and jaw, and temporal pain are all resolved.      She still has some right buttock pain when she is driving.  This can get up to 5-6/10.  The pain is currently 2-3/10.  She still has some right leg weakness.       She is needing to have a gynecologic surgery soon.      She did try the Naltrexone but was started on Lopressor at the same time.  She developed day time fatigue and therefore stopped both of them.  She has returned the lopressor at half the dose and is hoping to resume the Naltrexone again.    Past Medical History   Past Medical History:    Arthritis    Autoimmune disorder (HCC)    Lupus    Cervical disc displacement    C4-C5, C5-C6 disc displacement    Chronic pain    Right foot after 2020 foot surgery    Depression    Daughter  (adopted)    Difficulty urinating    Hiatal hernia with gastroesophageal reflux    Hypothyroidism    Infertility    Interstitial cystitis    Lupus (HCC)    Migraine    Migraines    Peripheral neuropathy    Reactive depression (situational)    when daughter     Scalp psoriasis    SI (sacroiliac) joint dysfunction    Sjogren's disease (HCC)    Traumatic brain injury (HCC)    Tremor    Trigeminal neuralgia pain    Vaginal stricture     Vasculitis (HCC)    nose and sinus    Vertigo       Past Surgical History   Past Surgical History:   Procedure Laterality Date    Arthroscopy, shoulder, surgical; w/rotator cuff repair Right 2012    subclavian reconstruction      Biopsy of skin lesion  09/2020    Cataract extraction Bilateral 2021    Colonoscopy  2012    Correct bunion,simple Right 2004    Egd  2012    Foot fracture surgery Right 2020    Other surgical history  2005    heel: sural nerve neuroma excision -- achilles tendon accidentally injured during surgery       Family History   Family History   Problem Relation Age of Onset    Hypertension Mother     Obesity Mother     Depression Mother     Dementia Father         dementia induced by MRSA sepsis (cause of death)    Infectious Disease Father         MRSA sepsis (cause of death)    Fibromyalgia Sister     Alcohol and Other Disorders Associated Brother         alcoholism    Depression Brother     Other (drug use) Brother     Breast Cancer Maternal Aunt 65    Depression Other         family h/o    Ovarian Cancer Neg     Colon Cancer Neg        Social History   Social History     Socioeconomic History    Marital status: Single     Spouse name: Not on file    Number of children: 0    Years of education: Not on file    Highest education level: Not on file   Occupational History    Occupation: psychiatrist     Comment: retired   Tobacco Use    Smoking status: Never    Smokeless tobacco: Never   Vaping Use    Vaping status: Never Used   Substance and Sexual Activity    Alcohol use: Not Currently     Comment: rare    Drug use: Never    Sexual activity: Not Currently   Other Topics Concern     Service Not Asked    Blood Transfusions No    Caffeine Concern Yes     Comment: tea, 1 cup    Occupational Exposure Not Asked    Hobby Hazards Not Asked    Sleep Concern Not Asked    Stress Concern Not Asked    Weight Concern Not Asked    Special Diet Not Asked    Back Care Not Asked    Exercise No    Bike  Helmet Not Asked    Seat Belt Not Asked    Self-Exams Not Asked   Social History Narrative    Lives alone    Feels safe    No hx of abuse     Social Determinants of Health     Financial Resource Strain: Not At Risk (7/6/2022)    [facility]    Financial Resource Strain     How hard is it for you to pay for the very basics like food, housing, medical care, and heating?: Not hard at all   Food Insecurity: No Food Insecurity (7/6/2022)    [facility]    Food Insecurity     Currently or in the past 3 months, have you worried your food would run out before you had money to buy more?: No     In the past 12 months, have you run out of food or been unable to get more?: No   Transportation Needs: Unmet Transportation Needs (7/6/2022)    [facility]    Transportation Needs     Currently or in the past 3 months, has lack of transportation kept you from medical appointments, getting food or medicine, or providing care to a family member?: Not on file     Has the lack of transportation kept you from meetings, work, or from getting things needed for daily living?: Not on file     Medical Transportation Needs?: Yes     Daily Living Transportation Needs? [Peds Only] : Yes   Physical Activity: Not on file   Stress: Not on file   Social Connections: Not At Risk (7/6/2022)    [facility]    Social Connections     In a typical week, how many times do you talk on the phone with family, friends, or neighbors?: Once a week   Housing Stability: Low Risk  (7/15/2023)    [facility]    Housing Stability     Mortgage Payment Concerns?: Not on file     Number of Places Lived in the Last Year: Not on file     Unstable  Housing?: Not on file       PE:  The patient does appear in her stated age in no distress.  The patient is well groomed.    Psychiatric:  The patient is alert and oriented x 3.  The patient has a normal affect and mood.      Respiratory:  No acute respiratory distress. Patient does not have a cough.    HEENT:  Extraocular muscles are intact. There is no kern icterus. Pupils are equal, round, and reactive to light. No redness or discharge bilaterally.    Skin:  There are no rashes or lesions.    Lymph Nodes:  The patient has no palpable submandibular, supraclavicular, and cervical lymph nodes..    Vitals:  There were no vitals filed for this visit.    Cervical Spine:    Posture: mild chin forward superiorly rotated protracted shoulder posture.   Shoulders: Level   Head: In neutral   Spinous Processes Palpations: Tender at C7   Z-Joints Palpations: Tender at  left C6-7   Muscular Palpations: Non-tender to palpation.     Vascular upper extremity:   Right radial pulses: 2+   Left radial pulses: 2+      Neurological Upper Extremity:    Light Touch: Intact in Bilateral upper extremities.   Pin Prick: Not tested.   UE Muscle Strength: All Upper Extremity strength measurements 5/5   Reflexes: 2+ In the bilateral upper extremities.   Escoto's sign Right: Negative   Escoto's sigh Left: Negative     Gait:    Gait: Normal gait   Sit to Stand: no difficulty      Lumbar Spine:    Scoliosis: No scoliosis present     Lumbar Spine Palpation:    Spinous Processes: Non-tender for all Spinous Processes   Z-joints: Tender at  right L5-S1   SIJ: Tender at right SIJ   Piriformis Muscle: Non-tender bilateral Piriformis muscles   Greater Trochanteric Bursa: Non-tender for bilateral Greater Trochanteric Bursa     Vascular lower extremity:   Dorsalis pedis pulse-RIGHT 2+   Dorsalis pedis pulse-LEFT 2+   Tibialis posterior pulse-RIGHT 2+   Tibialis posterior pulse-LEFT 2+     Neurological Lower Extremity:    Light Touch Sensation: Intact in  bilateral LE except:  Decreased in the  first dorsal web space of the RIGHT foot   LE Muscle Strength: All LE strength measurements 5/5 except:  Hamstring RIGHT:   4+/5   RIGHT plantar reflexes: downward response   LEFT plantar reflexes: downward response   Reflexes: 2+ in bilateral lower extremities     Assessment  1. L5-S1 right mild foraminal bulging disc    2. right L3-4 radiculopathy clinically and chronic right L5 radiculopathy on EMG    3. Anxiety    4. Primary osteoarthritis of both hips: mild    5. Sjogren's syndrome, with unspecified organ involvement (HCC)    6. Lupus (HCC)    7. C6-7 left > right mild-mod diffuse, C5-6 mild-mod central, C4-5 mild central, C3-4 left mild foraminal bulging discs    8. Neck pain         Plan  She will get into the PT for the cervical and the lumbar spines.    She will resume the Naltrexone once she is ready.      She will follow up in 2-3 months or sooner if needed.    The patient understands and agrees with the stated plan.  Horacio Sadler MD  8/5/2024

## 2024-08-06 ENCOUNTER — OFFICE VISIT (OUTPATIENT)
Dept: NEUROLOGY | Facility: CLINIC | Age: 69
End: 2024-08-06
Payer: MEDICARE

## 2024-08-06 VITALS — WEIGHT: 140 LBS | HEIGHT: 64 IN | BODY MASS INDEX: 23.9 KG/M2

## 2024-08-06 DIAGNOSIS — M54.81 OCCIPITAL NEURALGIA OF LEFT SIDE: ICD-10-CM

## 2024-08-06 DIAGNOSIS — G25.0 ESSENTIAL TREMOR: ICD-10-CM

## 2024-08-06 DIAGNOSIS — G43.001 MIGRAINE WITHOUT AURA AND WITH STATUS MIGRAINOSUS, NOT INTRACTABLE: ICD-10-CM

## 2024-08-06 DIAGNOSIS — G43.709 CHRONIC MIGRAINE W/O AURA W/O STATUS MIGRAINOSUS, NOT INTRACTABLE: Primary | ICD-10-CM

## 2024-08-06 PROCEDURE — 99214 OFFICE O/P EST MOD 30 MIN: CPT | Performed by: OTHER

## 2024-08-06 RX ORDER — SUMATRIPTAN 100 MG/1
100 TABLET, FILM COATED ORAL EVERY 2 HOUR PRN
Qty: 27 TABLET | Refills: 3 | Status: SHIPPED | OUTPATIENT
Start: 2024-08-06

## 2024-08-06 RX ORDER — GALCANEZUMAB 120 MG/ML
120 INJECTION, SOLUTION SUBCUTANEOUS
Qty: 1 EACH | Refills: 11 | Status: SHIPPED | OUTPATIENT
Start: 2024-08-06 | End: 2025-08-06

## 2024-08-06 RX ORDER — PROPRANOLOL HYDROCHLORIDE 20 MG/1
TABLET ORAL
Qty: 360 TABLET | Refills: 3 | Status: SHIPPED | OUTPATIENT
Start: 2024-08-06

## 2024-08-06 NOTE — PROGRESS NOTES
[facility] [address], [address]  [facility] [address]  [phone]            Neurology follow-up visit     Referred By: Dr. Melton ref. provider found    Chief Complaint:   Chief Complaint   Patient presents with    Tremors     LOV 05/21/24 Pt presents with migraine and essential tremors.Pt is taking        HPI:     Debbie Peterson is a 68 year old female, who presents for history of migraines, tremors.  Patient has been seen by different physicians in the past, she was living in [place] at one time.  She was also followed with [facility] specialists.  She had history of trigeminal neuralgia but has improved since 2019.  That was on the right side.  However since at least 2014 she has been developing migraines in the left side of the head, described as typical migraines, associate with throbbing sensation, light sensitive, nausea.    She had hard time breaking the cycle of those headaches.  Also history of tremors.  Mostly at the action and posture.  Does not affect her daily activities overall that much.  Her grandmother in her 90s also had history of tremors.      Nerve blocks were done at that time.    Patient came back for follow-up in May 2024.  MRI of the brain was scheduled.    Headaches persisted, essentially daily basis, mostly behind the left eye, associated with light sensitivity, noise sensitivity, sometimes she cannot get out of bed.  Therefore propranolol was tried, with the hope of treatment of tremors and migraines at the same time.  However apparently she had a rash that was attributed to propranolol.  Therefore she stopped.  Rash has resolved.  However she continues with essentially daily headache, especially behind the left eye.  She was worried frustrated by that fact.  She was also having difficulties falling asleep since headaches are preventing her getting good sleep.    At that point patient was tried on Emgality.  MRI of the brain and IAC  was done in outside institution, it was not remarkable.  Patient came back for follow-up in 2024, She was reporting the headaches are much more improved with Emgality.  Headaches might breakthrough by the end of the month.  At that time she will use Imitrex and blood work.  The meantime propranolol was already helpful as needed for her essential tremors.  She was interested in taking it all the time since she was much more active in all days that her headaches were better controlled.      Past Medical History:    Arthritis    Autoimmune disorder (HCC)    Lupus    Cervical disc displacement    C4-C5, C5-C6 disc displacement    Chronic pain    Right foot after  foot surgery    Depression    Daughter  (adopted)    Difficulty urinating    Hiatal hernia with gastroesophageal reflux    Hypothyroidism    Infertility    Interstitial cystitis    Lupus (HCC)    Migraine    Migraines    Peripheral neuropathy    Reactive depression (situational)    when daughter     Scalp psoriasis    SI (sacroiliac) joint dysfunction    Sjogren's disease (HCC)    Traumatic brain injury (HCC)    Tremor    Trigeminal neuralgia pain    Vaginal stricture    Vasculitis (HCC)    nose and sinus    Vertigo       Past Surgical History:   Procedure Laterality Date    Arthroscopy, shoulder, surgical; w/rotator cuff repair Right     subclavian reconstruction      Biopsy of skin lesion  2020    Cataract extraction Bilateral     Colonoscopy  2012    Correct bunion,simple Right     Egd      Foot fracture surgery Right     Other surgical history      heel: sural nerve neuroma excision -- achilles tendon accidentally injured during surgery       Social history:  History   Smoking Status    Never   Smokeless Tobacco    Never     History   Alcohol Use Not Currently     Comment: rare     History   Drug Use Unknown       Family History   Problem Relation Age of Onset    Hypertension Mother     Obesity Mother      Depression Mother     Dementia Father         dementia induced by MRSA sepsis (cause of death)    Infectious Disease Father         MRSA sepsis (cause of death)    Fibromyalgia Sister     Alcohol and Other Disorders Associated Brother         alcoholism    Depression Brother     Other (drug use) Brother     Breast Cancer Maternal Aunt 65    Depression Other         family h/o    Ovarian Cancer Neg     Colon Cancer Neg          Current Outpatient Medications:     PROPRANOLOL 20 MG Oral Tab, TAKE 1 TABLET BY MOUTH AS NEEDED BEFORE GOING OUT, Disp: 30 tablet, Rfl: 0    Naltrexone Does not apply Powder, compound 1 mg capsules to be taken orally daily., Disp: 30 each, Rfl: 3    Galcanezumab-gnlm (EMGALITY) 120 MG/ML Subcutaneous Solution Auto-injector, Inject 120 mg into the skin every 30 (thirty) days., Disp: 1 each, Rfl: 11    ramelteon 8 MG Oral Tab, Take 1 tablet (8 mg total) by mouth nightly., Disp: 90 tablet, Rfl: 1    Estradiol-Norethindrone Acet 0.5-0.1 MG Oral Tab, Take 1 tablet by mouth daily., Disp: 84 tablet, Rfl: 3    LORazepam 1 MG Oral Tab, Take 1 tablet (1 mg total) by mouth 3 (three) times daily., Disp: 90 tablet, Rfl: 3    ondansetron (ZOFRAN) 4 mg tablet, Take 1 tablet (4 mg total) by mouth every 8 (eight) hours as needed for Nausea., Disp: 30 tablet, Rfl: 3    miSOPROStol 100 MCG Oral Tab, Take 2 tablets per vagina the night prior to the procedure., Disp: 2 tablet, Rfl: 0    SUMAtriptan 20 MG/ACT Nasal Solution, 1 spray by Nasal route every 2 (two) hours as needed for Migraine. (Patient not taking: Reported on 6/23/2024), Disp: 1 each, Rfl: 11    SUMAtriptan Succinate (IMITREX) 100 MG Oral Tab, Take 1 tablet (100 mg total) by mouth every 2 (two) hours as needed for Migraine. Use at onset; repeat once after 2 HRS-ONLY 2 doses of any sumatriptan (nasal, oral, or injectable) IN 24 HR MAX., Disp: 27 tablet, Rfl: 3    Fluocinonide 0.05 % External Solution, Apply 1 Application  topically daily., Disp: 60  mL, Rfl: 0    omeprazole 20 MG Oral Capsule Delayed Release, Take 1 capsule (20 mg total) by mouth before breakfast., Disp: 90 capsule, Rfl: 0    azelastine 0.1 % Nasal Solution, 2 sprays by Nasal route 2 (two) times daily., Disp: 90 mL, Rfl: 1    betamethasone dipropionate 0.05 % External Lotion, Apply 2 mL topically daily. To scalp, Disp: 180 mL, Rfl: 0    famotidine 20 MG Oral Tab, Take 1 tablet (20 mg total) by mouth nightly. (Patient taking differently: Take 20 mg by mouth daily.), Disp: 90 tablet, Rfl: 1    ipratropium 0.06 % Nasal Solution, 2 sprays by Nasal route 4 (four) times daily., Disp: 42 mL, Rfl: 3    levothyroxine 150 MCG Oral Tab, Take 1 tablet (150 mcg total) by mouth before breakfast., Disp: 90 tablet, Rfl: 3    fluticasone-umeclidin-vilant (TRELEGY ELLIPTA) 100-62.5-25 MCG/ACT Inhalation Aerosol Powder, Breath Activated, Inhale 1 puff into the lungs daily., Disp: , Rfl:     Meloxicam 7.5 MG Oral Tab, , Disp: , Rfl:     Ivermectin 1 % External Cream, APPLY THIN LAYER TOPICALLY TO FACE EVERY DAY, Disp: , Rfl:     RHOFADE 1 % External Cream, Apply 1 Application topically every morning., Disp: , Rfl:     methotrexate 2.5 MG Oral Tab, Take 3 tablets (7.5 mg total) by mouth once a week., Disp: , Rfl:     valACYclovir 500 MG Oral Tab, , Disp: , Rfl:     mometasone furoate 50 MCG/ACT Nasal Suspension, 1 spray by Nasal route daily., Disp: 51 g, Rfl: 2    Ibuprofen (MOTRIN OR), Take 200 mg by mouth., Disp: , Rfl:     Azelaic Acid (FINACEA) 15 % External Gel, Apply topically daily., Disp: 1 each, Rfl: 0    Syringe, Disposable, (EASY GLIDE LUER LOCK SYRINGE) 1 ML Does not apply Misc, Use with sumatriptan daily prn., Disp: 25 each, Rfl: 0    Needle, Disp, 26G X 1/2\" Does not apply Misc, Use with Imitrex injection., Disp: 25 each, Rfl: 0    Syringe, Disposable, 1 ML Does not apply Misc, Use with sumatriptan injectable solution, Disp: 2 each, Rfl: 1    cholecalciferol 50 MCG (2000 UT) Oral Cap, Take 1 capsule  (2,000 Units total) by mouth daily. Take 2 tablets daily, Disp: 90 capsule, Rfl: 0    Allergies   Allergen Reactions    Cyclobenzaprine OTHER (SEE COMMENTS)     Drowsy, nightmares    Gabapentin OTHER (SEE COMMENTS)     Upper and lower extremity ataxia and cognitive impairment     Hydroxychloroquine OTHER (SEE COMMENTS)     eye toxicity    Ropinirole OTHER (SEE COMMENTS)     Vertigo     Naproxen OTHER (SEE COMMENTS)    Sulfa Antibiotics HIVES    Trimethoprim HIVES    Biaxin [Clarithromycin] ITCHING    Sulfamethoxazole W/Trimethoprim ITCHING       ROS:   As in HPI, the rest of the 14 system review was done and was negative      Physical Exam:  There were no vitals filed for this visit.      General: No apparent distress, well nourished, well groomed.  Head- Normocephalic, atraumatic  Eyes- No redness or swelling  ENT- Hearing intake, normal glutition  Neck- No masses or adenopathy  Cv: pulses were palpable and normal, no cyanosis or edema     Neurological:     Mental Status- Alert and oriented x3.  Normal attention span and concentration  Thought process intact  Memory intact- recent and remote  Mood intact  Fund of knowledge appropriate for education and age    Language intact including: comprehension, naming, repetition, vocabulary    Cranial Nerves:    VII. Face symmetric, no facial weakness  VIII. Hearing intact to whisper.  IX. Pallet elevates symmetrically.  XI. Shoulder shrug is intact  XII. Tongue is midline    Motor Exam:  Muscle tone normal  No atrophy or fasciculations  Strength- upper extremities 5/5 proximally and distally                  Rapid alternating movements intact    Gait:  Normal posture  Normal physiologic      Labs:    Lab Results   Component Value Date    TSH 2.050 05/30/2023     Lab Results   Component Value Date    HDL 91 (H) 05/30/2023    LDL 88 05/30/2023    TRIG 80 05/30/2023     Lab Results   Component Value Date    HGB 13.8 02/21/2024    HCT 40.6 02/21/2024    MCV 95.5 02/21/2024     WBC 6.1 02/21/2024    .0 02/21/2024      Lab Results   Component Value Date    BUN 25 (H) 02/21/2024    CA 9.3 02/21/2024    ALT 14 02/21/2024    AST 16 02/21/2024    ALKPHOS 47 09/23/2014    ALB 4.3 02/21/2024     02/21/2024    K 3.6 02/21/2024     02/21/2024    CO2 27.0 02/21/2024      I have reviewed labs.      Assessment   1. Essential tremor  Clear benefit with propranolol, this point we will start twice a day prescription, 20 mg and if it is not sufficient then 40 mg twice a day.    2. Chronic migraine w/o aura w/o status migrainosus, not intractable    Patient had poor side effects with history of Botox, propranolol, topiramate, possible Depakote, amitriptyline, therefore Emgality was tried and was quite helpful.    3. Migraine without aura and with status migrainosus, not intractable    - SUMAtriptan Succinate (IMITREX) 100 MG Oral Tab; Take 1 tablet (100 mg total) by mouth every 2 (two) hours as needed for Migraine. Use at onset; repeat once after 2 HRS-ONLY 2 doses of any sumatriptan (nasal, oral, or injectable) IN 24 HR MAX.  Dispense: 27 tablet; Refill: 3           Education and counseling provided to patient. Instructed patient to call my office or seek medical attention immediately if symptoms worsen.  Patient verbalized understanding of information given. All questions were answered. All side effects of drugs were discussed.       Return to clinic in: No follow-ups on file.    Joshua Irving MD

## 2024-08-26 ENCOUNTER — TELEPHONE (OUTPATIENT)
Dept: PHYSICAL MEDICINE AND REHAB | Facility: CLINIC | Age: 69
End: 2024-08-26

## 2024-08-26 DIAGNOSIS — M54.16 LUMBAR RADICULOPATHY: Primary | ICD-10-CM

## 2024-08-26 NOTE — TELEPHONE ENCOUNTER
Location of symptoms: Constant right low back pain that radiates into glute. Denies N/T.  Ongoing weakness, no worsening.  Date symptoms Began: Started 10 days ago after doing chores, lifting/squatting/twisting   Current treatment: Motrin, resting, stretching, ice.         Seen in ER/Urgent care: No    If the following symptoms are identified route high priority and verbally notify provider: Bowel/bladder incontinence, new/acute weakness, signs of infection (fever, redness, swelling, drainage), HA the worsens while standing and improves while laying.    Numeric Rating Scale:  Pain at Present:  5/10                                                                                                            (No Pain) 0  to  10 (Worst Pain)                 Minimum Pain:   3/10  Maximum Pain  8/10      Description of Pain:   sharp/stabbing  Aggravating Factors:    Standing, Walking, and Other Stretching/reaching    LOV: 8/5/2024 Horacio Sadler MD    NOV: 10/16/2024 Horacio Sadler MD     Summary of patient request: Requesting an epidural, (Right L5 transforaminal epidural steroid injection)

## 2024-09-03 ENCOUNTER — TELEPHONE (OUTPATIENT)
Dept: PHYSICAL MEDICINE AND REHAB | Facility: CLINIC | Age: 69
End: 2024-09-03

## 2024-09-03 DIAGNOSIS — M54.16 LUMBAR RADICULOPATHY: Primary | ICD-10-CM

## 2024-09-03 NOTE — TELEPHONE ENCOUNTER
Patient has been scheduled for Right L4 transforaminal epidural steroid injection on 9/17/24 at the [facility] with Dr. Sadler.   Anesthesia type:  Local  Please note: The [facility] will call the business day prior to discuss the exact time/arrival and additional instructions for your appointment.  Patient was advised that if he/she does receive the covid vaccine it needs to be at least 2 weeks before or after the injection.  Medications and allergies reviewed.  Educated to hold NSAIDS (Aleve, Ibuprofen, Motrin, Advil) and anti-inflammatories (Meloxicam, Naproxen, Diclofenac, Celebrex) and for cervical injections must hold Multi-Vitamins, Vitamin E, Fish Oil/Omega-3.  If patient is receiving MAC/IVCS, weight loss oral/injectable medications will need to be held for 7 days prior to injection.  Patient informed to fast 8 hours prior to procedure and 10-12 hours prior to procedure with IVCS/MAC if patient is on a weight loss medication.   If on blood thinner, clearance has been received and approved to hold this medication by provider.   Patient informed of [facility]'s  policy:  he/she will need a  to and from procedure and must be on site for their entirety of their visit, if their ride is unable to the procedure will be cancelled.   [facility] is located in the [facility] [address] [address].   may park in the yellow/purple parking lot.  Patient verbalized understanding and agrees with plan.  Scheduled in Epic: Yes  Scheduled in Surgical Case: Yes  Follow up appointment made: NOV: 10/16/2024 Horacio Sadler MD

## 2024-09-03 NOTE — TELEPHONE ENCOUNTER
Per CMS Guidelines -no authorization is required for Right L5-S1 TFESI under fluoroscopy guidance  CPT code: 33487       Status: Authorization is not required based on medical necessity however is not a guarantee of payment and may be subject to review once claim is submitted-Covered Benefit.  This will be injection #3 in a rolling 12 month period.

## 2024-09-12 ENCOUNTER — OFFICE VISIT (OUTPATIENT)
Facility: CLINIC | Age: 69
End: 2024-09-12
Payer: MEDICARE

## 2024-09-12 VITALS
DIASTOLIC BLOOD PRESSURE: 84 MMHG | SYSTOLIC BLOOD PRESSURE: 127 MMHG | HEIGHT: 64 IN | WEIGHT: 140 LBS | HEART RATE: 73 BPM | BODY MASS INDEX: 23.9 KG/M2

## 2024-09-12 DIAGNOSIS — K21.9 GASTROESOPHAGEAL REFLUX DISEASE, UNSPECIFIED WHETHER ESOPHAGITIS PRESENT: ICD-10-CM

## 2024-09-12 DIAGNOSIS — Z12.11 COLON CANCER SCREENING: ICD-10-CM

## 2024-09-12 DIAGNOSIS — R19.5 CHANGE IN CONSISTENCY OF STOOL: Primary | ICD-10-CM

## 2024-09-12 DIAGNOSIS — K80.20 CALCULUS OF GALLBLADDER WITHOUT CHOLECYSTITIS WITHOUT OBSTRUCTION: ICD-10-CM

## 2024-09-12 DIAGNOSIS — K76.89 LIVER CYST: ICD-10-CM

## 2024-09-12 PROCEDURE — 99214 OFFICE O/P EST MOD 30 MIN: CPT

## 2024-09-12 RX ORDER — MONTELUKAST SODIUM 4 MG/1
1 TABLET, CHEWABLE ORAL 2 TIMES DAILY
Qty: 60 TABLET | Refills: 2 | Status: SHIPPED | OUTPATIENT
Start: 2024-09-12 | End: 2024-12-11

## 2024-09-12 NOTE — PROGRESS NOTES
[facility] - Gastroenterology                                                                                                      Clinic Follow-up Visit    Chief Complaint   Patient presents with    Diarrhea         Subjective/HPI:   Debbie Peterson is a 68 year old year old female with active medical conditions including lupus, cervical disc disease, hiatal hernia, GERD, Sjogren's syndrome, migraines, trigeminal neuralgia pain, vertigo, peripheral neuropathy, anxiety, acquired hypothyroidism, vitamin D deficiency, spine issues, osteoarthritis and history listed in note table.     she is here today for follow-up.   #change in stool  #cholelithiasis  #liver cyst   -reports stool changes after taking augmentin in June for ear infection, had \"explsove\" diarrhea for about 1 month. This treansitioned to about 4 episodes daily of loose stool with fecal urgency and even incontinence. She started colestipol about 2 weeks ago, since starting colestipol having 2-3 bowel movement daily, bristol stool type 3-4. She decreased soluble fiber in diet when diarrhea started   -in 2022 was on cholestyramine started by PCP, stayed on for about 6 months for loose and sticky stool. She then stopped and her stool had been normal up until augmentin use.   -hx of cholelithiasis and liver cyst. Last ultrasound in 2022. Denies abdominal pain. LFTs WNL in February 2024    #GERD  -she takes famotidine 20mg in the morning. For flares she adds omeprazole or doubles dose of famotidine   -rare heartburn. Does have sinus issues with recurrent ear infections. Uses 3 nasal sprays which help. Feels GERD and sinus issues exacerbate each other.      Negative for:   Abdominal/rectal pain, unintentional weight loss, dysphagia, anorexia, overt GI bleed/melena, fever.    PRIOR GI WORK UP:     April 2022: office visit Dr. Prince  -    Last colonoscopy:    Last  EGD: 2012    Wt Readings from Last 6 Encounters:   24 140 lb (63.5 kg)   24 140 lb (63.5 kg)   24 140 lb (63.5 kg)   24 138 lb (62.6 kg)   07/15/24 138 lb (62.6 kg)   24 138 lb (62.6 kg)        History, Medications, Allergies, ROS:      Past Medical History:    Arthritis    Autoimmune disorder (HCC)    Lupus    Cervical disc displacement    C4-C5, C5-C6 disc displacement    Chronic pain    Right foot after  foot surgery    Depression    Daughter  (adopted)    Difficulty urinating    Hiatal hernia with gastroesophageal reflux    Hypothyroidism    Infertility    Interstitial cystitis    Lupus (HCC)    Migraine    Migraines    Peripheral neuropathy    Reactive depression (situational)    when daughter     Scalp psoriasis    SI (sacroiliac) joint dysfunction    Sjogren's disease (HCC)    Traumatic brain injury (HCC)    Tremor    Trigeminal neuralgia pain    Vaginal stricture    Vasculitis (HCC)    nose and sinus    Vertigo      Past Surgical History:   Procedure Laterality Date    Arthroscopy, shoulder, surgical; w/rotator cuff repair Right     subclavian reconstruction      Biopsy of skin lesion  2020    Cataract extraction Bilateral     Colonoscopy  2012    Correct bunion,simple Right     Egd  2012    Foot fracture surgery Right     Other surgical history      heel: sural nerve neuroma excision -- achilles tendon accidentally injured during surgery      Family History   Problem Relation Age of Onset    Hypertension Mother     Obesity Mother     Depression Mother     Dementia Father         dementia induced by MRSA sepsis (cause of death)    Infectious Disease Father         MRSA sepsis (cause of death)    Fibromyalgia Sister     Alcohol and Other Disorders Associated Brother         alcoholism    Depression Brother     Other (drug use) Brother     Breast Cancer Maternal Aunt 65    Depression Other         family h/o    Ovarian Cancer Neg     Colon Cancer  Neg       Social History:   Social History     Socioeconomic History    Marital status: Single    Number of children: 0   Occupational History    Occupation: psychiatrist     Comment: retired   Tobacco Use    Smoking status: Never    Smokeless tobacco: Never   Vaping Use    Vaping status: Never Used   Substance and Sexual Activity    Alcohol use: Not Currently     Comment: rare    Drug use: Never    Sexual activity: Not Currently   Other Topics Concern    Blood Transfusions No    Caffeine Concern Yes     Comment: tea, 1 cup    Exercise No   Social History Narrative    Lives alone    Feels safe    No hx of abuse     Social Determinants of Health     Financial Resource Strain: Not At Risk (7/6/2022)    Received from [facility], [facility]    Financial Resource Strain     How hard is it for you to pay for the very basics like food, housing, medical care, and heating?: Not hard at all   Food Insecurity: No Food Insecurity (7/6/2022)    Received from [facility], [facility]    Food Insecurity     Currently or in the past 3 months, have you worried your food would run out before you had money to buy more?: No     In the past 12 months, have you run out of food or been unable to get more?: No   Transportation Needs: Unmet Transportation Needs (7/6/2022)    Received from [facility], [facility]    Transportation Needs     Medical Transportation Needs?: Yes     Daily Living Transportation Needs? [Peds Only] : Yes   Social Connections: Not At Risk (7/6/2022)    Received from [facility], [facility]    Social Connections     In a typical week, how many times do you talk on the phone with family, friends, or neighbors?: Once a week    Received from [facility], [facility]    Housing Stability        Medications (Active prior to today's  visit):  Current Outpatient Medications   Medication Sig Dispense Refill    colestipol 1 g Oral Tab Take 1 tablet (1 g total) by mouth 2 (two) times daily. 60 tablet 2    omeprazole 20 MG Oral Capsule Delayed Release Take 1 capsule (20 mg total) by mouth every morning. 30 capsule 2    colestipol 1 g Oral Tab       Galcanezumab-gnlm (EMGALITY) 120 MG/ML Subcutaneous Solution Auto-injector Inject 120 mg into the skin every 30 (thirty) days. 1 each 11    propranolol 20 MG Oral Tab 2 pill  tablet 3    Naltrexone Does not apply Powder compound 1 mg capsules to be taken orally daily. 30 each 3    Estradiol-Norethindrone Acet 0.5-0.1 MG Oral Tab Take 1 tablet by mouth daily. 84 tablet 3    LORazepam 1 MG Oral Tab Take 1 tablet (1 mg total) by mouth 3 (three) times daily. 90 tablet 3    ondansetron (ZOFRAN) 4 mg tablet Take 1 tablet (4 mg total) by mouth every 8 (eight) hours as needed for Nausea. 30 tablet 3    miSOPROStol 100 MCG Oral Tab Take 2 tablets per vagina the night prior to the procedure. 2 tablet 0    Fluocinonide 0.05 % External Solution Apply 1 Application  topically daily. 60 mL 0    azelastine 0.1 % Nasal Solution 2 sprays by Nasal route 2 (two) times daily. 90 mL 1    betamethasone dipropionate 0.05 % External Lotion Apply 2 mL topically daily. To scalp 180 mL 0    famotidine 20 MG Oral Tab Take 1 tablet (20 mg total) by mouth nightly. (Patient taking differently: Take 1 tablet (20 mg total) by mouth daily.) 90 tablet 1    ipratropium 0.06 % Nasal Solution 2 sprays by Nasal route 4 (four) times daily. 42 mL 3    levothyroxine 150 MCG Oral Tab Take 1 tablet (150 mcg total) by mouth before breakfast. 90 tablet 3    fluticasone-umeclidin-vilant (TRELEGY ELLIPTA) 100-62.5-25 MCG/ACT Inhalation Aerosol Powder, Breath Activated Inhale 1 puff into the lungs daily.      Ivermectin 1 % External Cream APPLY THIN LAYER TOPICALLY TO FACE EVERY DAY      methotrexate 2.5 MG Oral Tab Take 3 tablets (7.5 mg total)  by mouth once a week.      valACYclovir 500 MG Oral Tab       mometasone furoate 50 MCG/ACT Nasal Suspension 1 spray by Nasal route daily. 51 g 2    Ibuprofen (MOTRIN OR) Take 200 mg by mouth.      Azelaic Acid (FINACEA) 15 % External Gel Apply topically daily. 1 each 0    Syringe, Disposable, (EASY GLIDE LUER LOCK SYRINGE) 1 ML Does not apply Misc Use with sumatriptan daily prn. 25 each 0    Needle, Disp, 26G X 1/2\" Does not apply Misc Use with Imitrex injection. 25 each 0    Syringe, Disposable, 1 ML Does not apply Misc Use with sumatriptan injectable solution 2 each 1    cholecalciferol 50 MCG (2000 UT) Oral Cap Take 1 capsule (2,000 Units total) by mouth daily. Take 2 tablets daily 90 capsule 0    SUMAtriptan Succinate (IMITREX) 100 MG Oral Tab Take 1 tablet (100 mg total) by mouth every 2 (two) hours as needed for Migraine. Use at onset; repeat once after 2 HRS-ONLY 2 doses of any sumatriptan (nasal, oral, or injectable) IN 24 HR MAX. (Patient not taking: Reported on 9/12/2024) 27 tablet 3    SUMAtriptan 20 MG/ACT Nasal Solution 1 spray by Nasal route every 2 (two) hours as needed for Migraine. (Patient not taking: Reported on 9/12/2024) 1 each 11       Allergies:  Allergies   Allergen Reactions    Cyclobenzaprine OTHER (SEE COMMENTS)     Drowsy, nightmares    Gabapentin OTHER (SEE COMMENTS)     Upper and lower extremity ataxia and cognitive impairment     Hydroxychloroquine OTHER (SEE COMMENTS)     eye toxicity    Meloxicam PALPITATIONS, SHORTNESS OF BREATH and TINITUS    Ropinirole OTHER (SEE COMMENTS)     Vertigo     Naproxen OTHER (SEE COMMENTS)    Sulfa Antibiotics HIVES    Trimethoprim HIVES    Biaxin [Clarithromycin] ITCHING    Sulfamethoxazole W/Trimethoprim ITCHING       ROS:   CONSTITUTIONAL: negative for fevers, chills, sweats  EYES Negative for scleral icterus or redness, and diplopia  HEENT: Negative for hoarseness  RESPIRATORY: Negative for cough and severe shortness of breath  CARDIOVASCULAR:  Negative for crushing sub-sternal chest pain  GASTROINTESTINAL: See HPI  GENITOURINARY: Negative for dysuria  MUSCULOSKELETAL: Negative for arthralgias and myalgias  SKIN: Negative for jaundice, rash or pruritus  NEUROLOGICAL: Negative for dizziness and headaches  BEHAVIOR/PSYCH: Negative for psychotic behavior    PHYSICAL EXAM:   Blood pressure 127/84, pulse 73, height 5' 4\" (1.626 m), weight 140 lb (63.5 kg), not currently breastfeeding.    Gen- alert, no acute distress, well-nourished  HEENT: anicteric sclera, neck supple, trachea midline, MMM, no palpable or tender neck or supraclavicular lymph nodes  CV- RRR, the extremities are warm and well perfused   Lungs- No increased work of breathing. CTAB   Abdomen- Soft, symmetrical, non-tender without distention or guarding. No scars or lesions. Aorta is without bruit or visible pulsation. Umbilicus is midline without herniation. Normoactive bowel sounds are present, No masses, hepatomegaly or splenomegaly noted.  MSK: no erythema, warmth, no swelling of joints  Skin- No jaundice, erythema, rashes or lesions  Hematologic- no bleeding or bruising  Neuro- Alert and interactive, KUHN   Psych - appropriate mood & affect    Labs/Imaging:     Patient's labs and imaging were reviewed and discussed with patient today.     .  ASSESSMENT/PLAN:   Debbie Peterson is a 68 year old year old female with active medical conditions including lupus, cervical disc disease, hiatal hernia, GERD, Sjogren's syndrome, migraines, trigeminal neuralgia pain, vertigo, peripheral neuropathy, anxiety, acquired hypothyroidism, vitamin D deficiency, spine issues, osteoarthritis and history listed in note table.     she is here today for follow-up.   #change in stool  #cholelithiasis  #liver cyst   -reports stool changes after taking augmentin in June for ear infection, had \"explsove\" diarrhea for about 1 month. This treansitioned to about 4 episodes daily of loose stool with fecal urgency and even  incontinence. She started colestipol about 2 weeks ago, since starting colestipol having 2-3 bowel movement daily, bristol stool type 3-4. She decreased soluble fiber in diet when diarrhea started   -in 2022 was on cholestyramine started by PCP, stayed on for about 6 months for loose and sticky stool. She then stopped and her stool had been normal up until augmentin use.   -hx of cholelithiasis and liver cyst. Last ultrasound in 2022. Denies abdominal pain. LFTs WNL in February 2024  -advised to continue colestipol. Will complete C Diff test if watery stool returns. Ultrasound ordered to evaluate liver cyst and gallstones. Refilled colestipol    #GERD  -she takes famotidine 20mg in the morning. For flares she adds omeprazole or doubles dose of famotidine   -rare heartburn. Does have sinus issues with recurrent ear infections. Uses 3 nasal sprays which help. Feels GERD and sinus issues exacerbate each other.  -advised to follow up as needed for worsening symptoms. Refilled omeprazole, daily prn    #CRC screening  -we discussed screening options including cologuard and colonoscopy. Previously declined colonoscopy due to transportation issues and did not complete cologuard. Reports other medical issues taking priority currently but she will consider screening in 2025    Recommendations:  - C Diff test kit, turn in only if watery stool returns    -can continue colestipol 1g twice daily. Or can take 2g once daily. This may be easier for timing of other medications     -consider cologuard test or colonoscopy for future colorectal cancer screening     -call to schedule ultrasound abdomen #[phone]    -follow up yearly or as needed      Orders This Visit:  Orders Placed This Encounter   Procedures    C. diff toxigenic PCR (OPT)       Meds This Visit:  Requested Prescriptions     Signed Prescriptions Disp Refills    colestipol 1 g Oral Tab 60 tablet 2     Sig: Take 1 tablet (1 g total) by mouth 2 (two) times  daily.    omeprazole 20 MG Oral Capsule Delayed Release 30 capsule 2     Sig: Take 1 capsule (20 mg total) by mouth every morning.       Imaging & Referrals:  US ABDOMEN LIMITED (TIQ=[id number])     HAMIDA Davis    [facility]  9/12/2024    The dictation was partially prepared using Dragon Medical voice recognition software. As a result, errors may occur. When identified, these errors have been corrected. While every attempt is made to correct errors during dictation, discrepancies may still exist.

## 2024-09-12 NOTE — PATIENT INSTRUCTIONS
- C Diff test kit, turn in only if watery stool returns    -can continue colestipol 1g twice daily. Or can take 2g once daily. This may be easier for timing of other medications     -consider cologuard test or colonoscopy for future colorectal cancer screening     -call to schedule ultrasound abdomen #[phone]    -follow up yearly or as needed

## 2024-09-19 ENCOUNTER — PATIENT MESSAGE (OUTPATIENT)
Facility: CLINIC | Age: 69
End: 2024-09-19

## 2024-09-19 DIAGNOSIS — R19.5 CHANGE IN CONSISTENCY OF STOOL: ICD-10-CM

## 2024-09-19 RX ORDER — MONTELUKAST SODIUM 4 MG/1
2 TABLET, CHEWABLE ORAL 3 TIMES DAILY
Qty: 180 TABLET | Refills: 2 | Status: SHIPPED | OUTPATIENT
Start: 2024-09-19 | End: 2024-12-18

## 2024-09-19 NOTE — TELEPHONE ENCOUNTER
From: Debbie Peterson  To: Juleit Sy  Sent: 9/19/2024 8:48 AM CDT  Subject: Colestipol 1.0 mg New Dosing    Good Morning Juliet,     I have been gradually increasing the dose of the medication based on the level of diarrhea I am experiencing. I am now taking 2 tabsTID, before meals. I have only 2 days of medication remaining for this regimen.  Please call in a refill with the new dosing.  Thank you, Debbie Peterson

## 2024-09-19 NOTE — TELEPHONE ENCOUNTER
Juliet - please see St[facility]ner message and advise. Thanks pended order- please increase dose if agreeable with changes.

## 2024-09-24 ENCOUNTER — TELEPHONE (OUTPATIENT)
Facility: CLINIC | Age: 69
End: 2024-09-24

## 2024-09-24 DIAGNOSIS — R19.5 CHANGE IN CONSISTENCY OF STOOL: Primary | ICD-10-CM

## 2024-09-24 NOTE — TELEPHONE ENCOUNTER
Patient called to speak with a nurse in regards to the colestipol.       colestipol 1 g Oral Tab, Take 2 tablets (2 g total) by mouth in the morning, at noon, and at bedtime., Disp: 180 tablet, Rfl: 2

## 2024-09-24 NOTE — TELEPHONE ENCOUNTER
Filiberto Chung    Called and spoke to Dr Peterson, she verified her date of birth and name.    As per the patient,  she observed getting hot flushes during the night for approximately 1 week. Have not experienced it in several years. Also noted food  like vegetables and nuts are not digested well. She would like to know if this can cause malabsorption of vitamins.    She is asking if colestipol have these side effects.     I do not see this in the [facility] website.    She takes colestipol 5-6 tablets a day and has helped with urgency and diarrhea.    Hx hypothyroid.    Thank you

## 2024-09-25 NOTE — TELEPHONE ENCOUNTER
Juliet MEI, spoke to patient, she had additional questions.    She wanted clarification on when to take vitamins prior to colestipol dose as she reports she was told she was to take them 1 hour before colestipol dose vs the 4 hours mentioned on below note. She reports she has been taking vitamins 1.5 hours prior to colestipol.     She is wondering if there is a smaller tablet of Colestipol or a similar medication? She states she is experiencing increased acid reflux and is having some trouble swallowing the Colestipol.     She also wanted to inform you that she is currently on 6 tablets of Colestipol which is helping, but still having a fair amount of urgency and soft bowel movements. She is still waking up in the middle of the night.     Lastly, she wanted to know which vitamins you recommend she take due to her malabsorption with her meals?

## 2024-09-25 NOTE — TELEPHONE ENCOUNTER
I reviewed drug information for colestipol. No reports of night sweats. She can do a trial of stopping the medication and observing for symptoms.   Absorption of fat-soluble vitamins may be decreased. If she takes vitamins she should take them 4 hours prior to colestipol dose. Colestipol may decrease absorption of other medications as well and should be taken separately.

## 2024-09-27 ENCOUNTER — PATIENT MESSAGE (OUTPATIENT)
Facility: CLINIC | Age: 69
End: 2024-09-27

## 2024-09-27 RX ORDER — COLESTIPOL HYDROCHLORIDE 5 G/5G
5 GRANULE, FOR SUSPENSION ORAL DAILY
Qty: 450 G | Refills: 0 | Status: SHIPPED | OUTPATIENT
Start: 2024-09-27 | End: 2024-12-26

## 2024-09-27 NOTE — TELEPHONE ENCOUNTER
From: Debbie Peterson  To: Juliet Sy  Sent: 9/27/2024 9:38 AM CDT  Subject: Severe GERD pain:heartburn, dysphagia, burning mouth and sinus pain    Filiberto Chung,  I am struggling to find a way to balance the benefits of the Colestipol and the side effects. Two tabs tid have less urgency and more formed stool, but I still have 8 BM/day but the Sxs of the hiatal hernia are much worse. It is even harder now to swallow the Colestipol. It gets stuck in my throat and I gag and then have more acid in my esophagus and throat (above).     The 625 mg Colesevalem was easy to swallow.The smaller dose would also make for flexibility to titrate the dose. It is not in the Medicare formulary. I would need your help in gaining approval.  Should I make an appt. with you or Dr. Prince?    I have been having drenching hot flashes every night. Debbie HENRIQUEZ

## 2024-09-27 NOTE — TELEPHONE ENCOUNTER
Received a call back from the patient, date of birth and name verified.    APN message relayed.    She agreed and verbalized understanding.

## 2024-09-27 NOTE — TELEPHONE ENCOUNTER
I recommend a follow up office visit for medication titration and to discuss other questions she may have.   Can take any current vitamins 4 hours prior to colestipol  No additional vitamins prescribed by me at this time  I have switched the colestipol to granules instead of the tablet since they are difficult to swallow. I sent this to her pharmacy on file.  The dry granules should not be taken in dry form  Mix 5g of granules with a small glass of water or other liquid (about 4oz). Stir to mix

## 2024-09-30 ENCOUNTER — TELEPHONE (OUTPATIENT)
Dept: NEUROLOGY | Facility: CLINIC | Age: 69
End: 2024-09-30

## 2024-10-01 ENCOUNTER — PATIENT MESSAGE (OUTPATIENT)
Dept: NEUROLOGY | Facility: CLINIC | Age: 69
End: 2024-10-01

## 2024-10-01 DIAGNOSIS — G43.709 CHRONIC MIGRAINE W/O AURA W/O STATUS MIGRAINOSUS, NOT INTRACTABLE: Primary | ICD-10-CM

## 2024-10-02 ENCOUNTER — TELEPHONE (OUTPATIENT)
Dept: NEUROLOGY | Facility: CLINIC | Age: 69
End: 2024-10-02

## 2024-10-02 ENCOUNTER — MED REC SCAN ONLY (OUTPATIENT)
Dept: NEUROLOGY | Facility: CLINIC | Age: 69
End: 2024-10-02

## 2024-10-02 RX ORDER — GALCANEZUMAB 120 MG/ML
120 INJECTION, SOLUTION SUBCUTANEOUS
Qty: 1 EACH | Refills: 0 | Status: SHIPPED | OUTPATIENT
Start: 2024-10-02 | End: 2025-10-02

## 2024-10-02 RX ORDER — RIMEGEPANT SULFATE 75 MG/75MG
75 TABLET, ORALLY DISINTEGRATING ORAL AS NEEDED
Qty: 8 TABLET | Refills: 11 | Status: SHIPPED | OUTPATIENT
Start: 2024-10-02 | End: 2025-10-02

## 2024-10-02 NOTE — TELEPHONE ENCOUNTER
I will send for a new prescription for Emgality, however I doubt that will be covered by the insurance as an extra dose.  I would [facility] to [facility], I will send prescription for that as well.

## 2024-10-02 NOTE — TELEPHONE ENCOUNTER
From: Debbie Peterson  To: Joshua Irving  Sent: 10/1/2024 3:01 PM CDT  Subject: Break-thru Migraine    Hello,   I am having a recurrence of bile acid diarrhea. Seven days ago I began to experience drenching night sweats at 3:00 AM and migraines in the AM. I am following up with GI and Gyn about this, but appts. are weeks away. My cholesterol has always been low. I can only surmise the possibility that my endogenous cholesterol has been reduced which has reduced endogenous estrogen/ progesterone.    My next Emgality dose is 2 wks away. Is it possible to order a one time dose of Emgality that I could take in while I am waiting for an new BAM drug or new HRT.  Thank you, Debbie HENRIQUEZ

## 2024-10-03 ENCOUNTER — OFFICE VISIT (OUTPATIENT)
Facility: CLINIC | Age: 69
End: 2024-10-03
Payer: MEDICARE

## 2024-10-03 ENCOUNTER — PATIENT MESSAGE (OUTPATIENT)
Dept: OBGYN CLINIC | Facility: CLINIC | Age: 69
End: 2024-10-03

## 2024-10-03 VITALS
DIASTOLIC BLOOD PRESSURE: 87 MMHG | WEIGHT: 141 LBS | HEIGHT: 64 IN | BODY MASS INDEX: 24.07 KG/M2 | SYSTOLIC BLOOD PRESSURE: 137 MMHG | HEART RATE: 75 BPM

## 2024-10-03 DIAGNOSIS — R19.7 DIARRHEA, UNSPECIFIED TYPE: Primary | ICD-10-CM

## 2024-10-03 DIAGNOSIS — R19.7 DIARRHEA, UNSPECIFIED TYPE: ICD-10-CM

## 2024-10-03 PROCEDURE — 99214 OFFICE O/P EST MOD 30 MIN: CPT

## 2024-10-03 RX ORDER — LOPERAMIDE HCL 2 MG
2 CAPSULE ORAL 4 TIMES DAILY PRN
Qty: 90 CAPSULE | Refills: 0 | Status: SHIPPED | OUTPATIENT
Start: 2024-10-03

## 2024-10-03 RX ORDER — COLESEVELAM 180 1/1
625 TABLET ORAL 2 TIMES DAILY WITH MEALS
Qty: 60 TABLET | Refills: 0 | Status: SHIPPED | OUTPATIENT
Start: 2024-10-03 | End: 2024-10-04

## 2024-10-03 NOTE — PATIENT INSTRUCTIONS
-stop taking all medications. Turn in C Diff test.     -colesevelam prescribed twice daily    -add loperamide (imodium). Take 4mg your first dose of the day. Then can take 2mg if still having additional diarrhea. Max dose in a day 8mg

## 2024-10-03 NOTE — PROGRESS NOTES
[facility] - Gastroenterology                                                                                                      Clinic Follow-up Visit    Chief Complaint   Patient presents with    Follow - Up               Patient  is having side effects with medication , migraines, night sweats, dry mouth and nose.              Subjective/HPI:   Debbie Peterson is a 68 year old year old female with active medical conditions including lupus, cervical disc disease, hiatal hernia, GERD, Sjogren's syndrome, migraines, trigeminal neuralgia pain, vertigo, peripheral neuropathy, anxiety, acquired hypothyroidism, vitamin D deficiency, spine issues, osteoarthritis and history listed in note table.     Today:  #diarrhea  -since prior visit she has ongoing diarrhea, she has been titrating the colestipol tablets at home. If she does not take the colestipol her stool has multiple loose bowel movements a day with urgency and even urgency related incontinence.  When taking about 6g colestipol daily she can have 3-4 semi formed stools a day, but still has fecal urgency.   -she is taking 6-7 tablets colestipol a day. She is having side effect of dry mouth. Also states tablet size makes them difficult to swallow. Requesting colesevelam which she took in 2022  -she does have essential tremor and she thinks the power and granules options would be challenging    Prior visit 9/12/24:   #change in stool  #cholelithiasis  #liver cyst   -reports stool changes after taking augmentin in June for ear infection, had \"explsove\" diarrhea for about 1 month. This treansitioned to about 4 episodes daily of loose stool with fecal urgency and even incontinence. She started colestipol about 2 weeks ago, since starting colestipol having 2-3 bowel movement daily, bristol stool type 3-4. She decreased soluble fiber in diet when diarrhea started   -in   was on colesevelam started by PCP, stayed on for about 6 months for loose and sticky stool. She then stopped and her stool had been normal up until augmentin use.   -hx of cholelithiasis and liver cyst. Last ultrasound in . Denies abdominal pain. LFTs WNL in 2024    #GERD  -she takes famotidine 20mg in the morning. For flares she adds omeprazole or doubles dose of famotidine   -rare heartburn. Does have sinus issues with recurrent ear infections. Uses 3 nasal sprays which help. Feels GERD and sinus issues exacerbate each other.      Negative for:   Abdominal/rectal pain, unintentional weight loss, dysphagia, anorexia, overt GI bleed/melena, fever.    PRIOR GI WORK UP:     2022: office visit Dr. Prince  -    Last colonoscopy:    Last EGD:     Wt Readings from Last 6 Encounters:   10/03/24 141 lb (64 kg)   24 140 lb (63.5 kg)   24 140 lb (63.5 kg)   24 140 lb (63.5 kg)   24 138 lb (62.6 kg)   07/15/24 138 lb (62.6 kg)        History, Medications, Allergies, ROS:      Past Medical History:    Arthritis    Autoimmune disorder (HCC)    Lupus    Cervical disc displacement    C4-C5, C5-C6 disc displacement    Chronic pain    Right foot after  foot surgery    Depression    Daughter  (adopted)    Difficulty urinating    Hiatal hernia with gastroesophageal reflux    Hypothyroidism    Infertility    Interstitial cystitis    Lupus    Migraine    Migraines    Peripheral neuropathy    Reactive depression (situational)    when daughter     Scalp psoriasis    SI (sacroiliac) joint dysfunction    Sjogren's disease (HCC)    Traumatic brain injury (HCC)    Tremor    Trigeminal neuralgia pain    Vaginal stricture    Vasculitis (HCC)    nose and sinus    Vertigo      Past Surgical History:   Procedure Laterality Date    Arthroscopy, shoulder, surgical; w/rotator cuff repair Right     subclavian reconstruction      Biopsy of skin lesion  2020    Cataract extraction  Bilateral 2021    Colonoscopy  2012    Correct bunion,simple Right 2004    Egd  2012    Foot fracture surgery Right 2020    Other surgical history  2005    heel: sural nerve neuroma excision -- achilles tendon accidentally injured during surgery      Family History   Problem Relation Age of Onset    Hypertension Mother     Obesity Mother     Depression Mother     Dementia Father         dementia induced by MRSA sepsis (cause of death)    Infectious Disease Father         MRSA sepsis (cause of death)    Fibromyalgia Sister     Alcohol and Other Disorders Associated Brother         alcoholism    Depression Brother     Other (drug use) Brother     Breast Cancer Maternal Aunt 65    Depression Other         family h/o    Ovarian Cancer Neg     Colon Cancer Neg       Social History:   Social History     Socioeconomic History    Marital status: Single    Number of children: 0   Occupational History    Occupation: psychiatrist     Comment: retired   Tobacco Use    Smoking status: Never    Smokeless tobacco: Never   Vaping Use    Vaping status: Never Used   Substance and Sexual Activity    Alcohol use: Not Currently     Comment: rare    Drug use: Never    Sexual activity: Not Currently   Other Topics Concern    Blood Transfusions No    Caffeine Concern Yes     Comment: tea, 1 cup    Exercise No   Social History Narrative    Lives alone    Feels safe    No hx of abuse     Social Determinants of Health     Financial Resource Strain: Not At Risk (7/6/2022)    [facility]    Financial Resource Strain     How hard is it for you to pay for the very basics like food, housing, medical care, and heating?: Not hard at all   Food Insecurity: No Food Insecurity (7/6/2022)    [facility], [facility]    Food Insecurity     Currently or in the past 3 months, have you worried your food would run out before you had money to  buy more?: No     In the past 12 months, have you run out of food or been unable to get more?: No   Transportation Needs: Unmet Transportation Needs (7/6/2022)    [facility]    Transportation Needs     Medical Transportation Needs?: Yes     Daily Living Transportation Needs? [Peds Only] : Yes   Social Connections: Not At Risk (7/6/2022)    [facility]    Social Connections     In a typical week, how many times do you talk on the phone with family, friends, or neighbors?: Once a week    [facility]    Housing Stability        Medications (Active prior to today's visit):  Current Outpatient Medications   Medication Sig Dispense Refill    loperamide 2 MG Oral Cap Take 1 capsule (2 mg total) by mouth 4 (four) times daily as needed for Diarrhea. 90 capsule 0    colesevelam 625 MG Oral Tab Take 1 tablet (625 mg total) by mouth 2 (two) times daily with meals. 60 tablet 0    Rimegepant Sulfate (NURTEC) 75 MG Oral Tablet Dispersible Take 75 mg by mouth as needed. Take one tablet at onset of migraine.  Maximum dose in 24 hours is 1 tablet (75mg). 8 tablet 11    omeprazole 20 MG Oral Capsule Delayed Release Take 1 capsule (20 mg total) by mouth every morning. 30 capsule 2    Galcanezumab-gnlm (EMGALITY) 120 MG/ML Subcutaneous Solution Auto-injector Inject 120 mg into the skin every 30 (thirty) days. 1 each 11    propranolol 20 MG Oral Tab 2 pill  tablet 3    Estradiol-Norethindrone Acet 0.5-0.1 MG Oral Tab Take 1 tablet by mouth daily. 84 tablet 3    LORazepam 1 MG Oral Tab Take 1 tablet (1 mg total) by mouth 3 (three) times daily. 90 tablet 3    ondansetron (ZOFRAN) 4 mg tablet Take 1 tablet (4 mg total) by mouth every 8 (eight) hours as needed for Nausea. 30 tablet 3    SUMAtriptan 20 MG/ACT Nasal Solution 1 spray by Nasal  route every 2 (two) hours as needed for Migraine. 1 each 11    Fluocinonide 0.05 % External Solution Apply 1 Application  topically daily. 60 mL 0    azelastine 0.1 % Nasal Solution 2 sprays by Nasal route 2 (two) times daily. 90 mL 1    betamethasone dipropionate 0.05 % External Lotion Apply 2 mL topically daily. To scalp 180 mL 0    ipratropium 0.06 % Nasal Solution 2 sprays by Nasal route 4 (four) times daily. 42 mL 3    levothyroxine 150 MCG Oral Tab Take 1 tablet (150 mcg total) by mouth before breakfast. 90 tablet 3    fluticasone-umeclidin-vilant (TRELEGY ELLIPTA) 100-62.5-25 MCG/ACT Inhalation Aerosol Powder, Breath Activated Inhale 1 puff into the lungs daily.      Ivermectin 1 % External Cream APPLY THIN LAYER TOPICALLY TO FACE EVERY DAY      methotrexate 2.5 MG Oral Tab Take 3 tablets (7.5 mg total) by mouth once a week.      valACYclovir 500 MG Oral Tab       mometasone furoate 50 MCG/ACT Nasal Suspension 1 spray by Nasal route daily. 51 g 2    Ibuprofen (MOTRIN OR) Take 200 mg by mouth.      Azelaic Acid (FINACEA) 15 % External Gel Apply topically daily. 1 each 0    cholecalciferol 50 MCG (2000 UT) Oral Cap Take 1 capsule (2,000 Units total) by mouth daily. Take 2 tablets daily 90 capsule 0    Galcanezumab-gnlm (EMGALITY) 120 MG/ML Subcutaneous Solution Auto-injector Inject 120 mg into the skin every 30 (thirty) days. 1 each 0    SUMAtriptan Succinate (IMITREX) 100 MG Oral Tab Take 1 tablet (100 mg total) by mouth every 2 (two) hours as needed for Migraine. Use at onset; repeat once after 2 HRS-ONLY 2 doses of any sumatriptan (nasal, oral, or injectable) IN 24 HR MAX. (Patient not taking: Reported on 9/12/2024) 27 tablet 3    Naltrexone Does not apply Powder compound 1 mg capsules to be taken orally daily. 30 each 3    miSOPROStol 100 MCG Oral Tab Take 2 tablets per vagina the night prior to the procedure. 2 tablet 0    famotidine 20 MG Oral Tab Take 1 tablet (20 mg total) by mouth nightly. (Patient  taking differently: Take 20 mg by mouth daily.) 90 tablet 1    Syringe, Disposable, (EASY GLIDE LUER LOCK SYRINGE) 1 ML Does not apply Misc Use with sumatriptan daily prn. 25 each 0    Needle, Disp, 26G X 1/2\" Does not apply Misc Use with Imitrex injection. 25 each 0    Syringe, Disposable, 1 ML Does not apply Misc Use with sumatriptan injectable solution 2 each 1       Allergies:  Allergies   Allergen Reactions    Cyclobenzaprine OTHER (SEE COMMENTS)     Drowsy, nightmares    Gabapentin OTHER (SEE COMMENTS)     Upper and lower extremity ataxia and cognitive impairment     Hydroxychloroquine OTHER (SEE COMMENTS)     eye toxicity    Meloxicam PALPITATIONS, SHORTNESS OF BREATH and TINITUS    Ropinirole OTHER (SEE COMMENTS)     Vertigo     Naproxen OTHER (SEE COMMENTS)    Sulfa Antibiotics HIVES    Trimethoprim HIVES    Biaxin [Clarithromycin] ITCHING    Sulfamethoxazole W/Trimethoprim ITCHING       ROS:   CONSTITUTIONAL: negative for fevers, chills, sweats  EYES Negative for scleral icterus or redness, and diplopia  HEENT: Negative for hoarseness  RESPIRATORY: Negative for cough and severe shortness of breath  CARDIOVASCULAR: Negative for crushing sub-sternal chest pain  GASTROINTESTINAL: See HPI  GENITOURINARY: Negative for dysuria  MUSCULOSKELETAL: Negative for arthralgias and myalgias  SKIN: Negative for jaundice, rash or pruritus  NEUROLOGICAL: Negative for dizziness and headaches  BEHAVIOR/PSYCH: Negative for psychotic behavior    PHYSICAL EXAM:   Blood pressure 137/87, pulse 75, height 5' 4\" (1.626 m), weight 141 lb (64 kg), not currently breastfeeding.    Gen- alert, no acute distress, well-nourished  HEENT: anicteric sclera, neck supple, trachea midline, MMM, no palpable or tender neck or supraclavicular lymph nodes  CV- RRR, the extremities are warm and well perfused   Lungs- No increased work of breathing. CTAB   Abdomen- Soft, symmetrical, non-tender without distention or guarding. No scars or lesions.  Aorta is without bruit or visible pulsation. Umbilicus is midline without herniation. Normoactive bowel sounds are present, No masses, hepatomegaly or splenomegaly noted.  MSK: no erythema, warmth, no swelling of joints  Skin- No jaundice, erythema, rashes or lesions  Hematologic- no bleeding or bruising  Neuro- Alert and interactive, KUHN   Psych - appropriate mood & affect    Labs/Imaging:     Patient's labs and imaging were reviewed and discussed with patient today.     .  ASSESSMENT/PLAN:   Debbie Peterson is a 68 year old year old female with active medical conditions including lupus, cervical disc disease, hiatal hernia, GERD, Sjogren's syndrome, migraines, trigeminal neuralgia pain, vertigo, peripheral neuropathy, anxiety, acquired hypothyroidism, vitamin D deficiency, spine issues, osteoarthritis and history listed in note table.     Today:  #diarrhea  -since prior visit she has ongoing diarrhea, she has been titrating the colestipol tablets at home. If she does not take the colestipol her stool has multiple loose bowel movements a day with urgency and even urgency related incontinence.  When taking about 6g colestipol daily she can have 3-4 semi formed stools a day, but still has fecal urgency.   -she is taking 6-7 tablets colestipol a day. She is having side effect of dry mouth. Also states tablet size makes them difficult to swallow. Requesting colesevelam which she took in 2022  -she does have essential tremor and she thinks the power and granules options would be challenging    -plan: turn in C Diff test. If negative may trial rifaximin for possible IBS-diarrhea. Will add loperamide. Medication switch from colestipol to colesevelam BID.     Recommendations:  -stop taking all medications. Turn in C Diff test.     -colesevelam prescribed twice daily    -add loperamide (imodium). Take 4mg your first dose of the day. Then can take 2mg if still having additional diarrhea. Max dose in a day 8mg        Orders This Visit:  No orders of the defined types were placed in this encounter.      Meds This Visit:  Requested Prescriptions     Signed Prescriptions Disp Refills    loperamide 2 MG Oral Cap 90 capsule 0     Sig: Take 1 capsule (2 mg total) by mouth 4 (four) times daily as needed for Diarrhea.    colesevelam 625 MG Oral Tab 60 tablet 0     Sig: Take 1 tablet (625 mg total) by mouth 2 (two) times daily with meals.       Imaging & Referrals:  None     HAMIDA Davis    [facility] Gastroenterology  10/3/2024    The dictation was partially prepared using Dragon Medical voice recognition software. As a result, errors may occur. When identified, these errors have been corrected. While every attempt is made to correct errors during dictation, discrepancies may still exist.

## 2024-10-03 NOTE — TELEPHONE ENCOUNTER
From: Debbie Peterson  To: Rosmery Morfin  Sent: 10/3/2024 7:31 AM CDT  Subject: Hot Flashes and Migraines    I have an appt. with you 10/08/2024  I developed bile acid diarrhea and am working with GI with sequestering agents. My cholesterol has always been low. Ten days ago I started having daily 3:00 AM drenching hot flashes. I have sweats throughout the day. Worse, daily migraines have broken through the Emgality I take.  I can only guess that the Colestipol has excessively depleted endogenous hormones.   Would you consider calling in a higher dose of exogenous HRT? I am wiped out by diarrhea, migraines and hot flashes.  Thank you, Debbie Peterson

## 2024-10-04 RX ORDER — COLESEVELAM 180 1/1
625 TABLET ORAL 2 TIMES DAILY WITH MEALS
Qty: 180 TABLET | Refills: 0 | Status: SHIPPED | OUTPATIENT
Start: 2024-10-04 | End: 2024-12-19

## 2024-10-04 NOTE — TELEPHONE ENCOUNTER
Requested Prescriptions     Pending Prescriptions Disp Refills    COLESEVELAM 625 MG Oral Tab [Pharmacy Med Name: COLESEVELAM 625MG TABLETS] 180 tablet 0     Sig: TAKE 1 TABLET(625 MG) BY MOUTH TWICE DAILY WITH MEALS         colesevelam 625 MG Oral Tab 60 tablet 0 10/3/2024 11/2/2024       **Patient requests 90 days supply**       Pr    RR to office on call.

## 2024-10-05 ENCOUNTER — HOSPITAL ENCOUNTER (OUTPATIENT)
Dept: MAMMOGRAPHY | Facility: HOSPITAL | Age: 69
Discharge: HOME | End: 2024-10-05
Attending: INTERNAL MEDICINE
Payer: MEDICARE

## 2024-10-05 DIAGNOSIS — Z12.31 ENCOUNTER FOR SCREENING MAMMOGRAM FOR MALIGNANT NEOPLASM OF BREAST: ICD-10-CM

## 2024-10-05 PROCEDURE — 77063 BREAST TOMOSYNTHESIS BI: CPT | Performed by: INTERNAL MEDICINE

## 2024-10-05 PROCEDURE — 77067 SCR MAMMO BI INCL CAD: CPT | Performed by: INTERNAL MEDICINE

## 2024-10-07 RX ORDER — ESTRADIOL/NORETHINDRONE ACETATE TRANSDERMAL SYSTEM .05; .14 MG/D; MG/D
1 PATCH, EXTENDED RELEASE TRANSDERMAL
Qty: 12 PATCH | Refills: 3 | Status: SHIPPED | OUTPATIENT
Start: 2024-10-07

## 2024-10-08 ENCOUNTER — OFFICE VISIT (OUTPATIENT)
Dept: OBGYN CLINIC | Facility: CLINIC | Age: 69
End: 2024-10-08
Payer: MEDICARE

## 2024-10-08 ENCOUNTER — TELEPHONE (OUTPATIENT)
Dept: OBGYN CLINIC | Facility: CLINIC | Age: 69
End: 2024-10-08

## 2024-10-08 VITALS
WEIGHT: 145.88 LBS | BODY MASS INDEX: 24.9 KG/M2 | DIASTOLIC BLOOD PRESSURE: 74 MMHG | HEIGHT: 64 IN | SYSTOLIC BLOOD PRESSURE: 138 MMHG

## 2024-10-08 DIAGNOSIS — R93.89 THICKENED ENDOMETRIUM: Primary | ICD-10-CM

## 2024-10-08 DIAGNOSIS — N95.0 PMB (POSTMENOPAUSAL BLEEDING): Primary | ICD-10-CM

## 2024-10-08 DIAGNOSIS — R93.89 THICKENED ENDOMETRIUM: ICD-10-CM

## 2024-10-08 PROCEDURE — 99213 OFFICE O/P EST LOW 20 MIN: CPT | Performed by: OBSTETRICS & GYNECOLOGY

## 2024-10-08 NOTE — PROGRESS NOTES
CC: Patient is here for management of hot flashes.     HPI: Patient is a 68 year old  was having multiple episodes of diarrhea and has been treated for meds (thought due to gall bladder). For 10 days she has been having worsening migraines and hot flashes. She was on oral HRT and switched to patch but she has not started it yet.     She has had some recent vaginal bleeding.     Was supposed to have surgery 2024 which was cancelled due to SOB.     No LMP recorded. Patient is postmenopausal.    OB History    Para Term  AB Living   3       3     SAB IAB Ectopic Multiple Live Births   2   1          # Outcome Date GA Lbr Alejandro/2nd Weight Sex Type Anes PTL Lv   3 Ectopic               Birth Comments: occurred while in [place] -- no surgery No blood transfusion   2 SAB            1 SAB               Obstetric Comments   Pt cannot recall how many sab had; had IVF       GYN hx:    Hx Prior Abnormal Pap: Yes (13 HPV+)  Pap Date: 23  Pap Result Notes: Neg  LPS:      Past Medical History:    Arthritis    Autoimmune disorder (HCC)    Lupus    Cervical disc displacement    C4-C5, C5-C6 disc displacement    Chronic pain    Right foot after  foot surgery    Depression    Daughter  (adopted)    Difficulty urinating    Hiatal hernia with gastroesophageal reflux    Hypothyroidism    Infertility    Interstitial cystitis    Lupus    Migraine    Migraines    Peripheral neuropathy    Reactive depression (situational)    when daughter     Scalp psoriasis    SI (sacroiliac) joint dysfunction    Sjogren's disease (HCC)    Traumatic brain injury (HCC)    Tremor    Trigeminal neuralgia pain    Vaginal stricture    Vasculitis (HCC)    nose and sinus    Vertigo     Past Surgical History:   Procedure Laterality Date    Arthroscopy, shoulder, surgical; w/rotator cuff repair Right     subclavian reconstruction      Biopsy of skin lesion  2020    Cataract extraction Bilateral     Colonoscopy   2012    Correct bunion,simple Right 2004    Egd  2012    Foot fracture surgery Right 2020    Other surgical history  2005    heel: sural nerve neuroma excision -- achilles tendon accidentally injured during surgery     Allergies   Allergen Reactions    Cyclobenzaprine OTHER (SEE COMMENTS)     Drowsy, nightmares    Gabapentin OTHER (SEE COMMENTS)     Upper and lower extremity ataxia and cognitive impairment     Hydroxychloroquine OTHER (SEE COMMENTS)     eye toxicity    Meloxicam PALPITATIONS, SHORTNESS OF BREATH and TINITUS    Ropinirole OTHER (SEE COMMENTS)     Vertigo     Naproxen OTHER (SEE COMMENTS)    Sulfa Antibiotics HIVES    Trimethoprim HIVES    Biaxin [Clarithromycin] ITCHING    Sulfamethoxazole W/Trimethoprim ITCHING     Family History   Problem Relation Age of Onset    Hypertension Mother     Obesity Mother     Depression Mother     Dementia Father         dementia induced by MRSA sepsis (cause of death)    Infectious Disease Father         MRSA sepsis (cause of death)    Fibromyalgia Sister     Alcohol and Other Disorders Associated Brother         alcoholism    Depression Brother     Other (drug use) Brother     Breast Cancer Maternal Aunt 65    Depression Other         family h/o    Ovarian Cancer Neg     Colon Cancer Neg      Social History     Socioeconomic History    Marital status: Single     Spouse name: Not on file    Number of children: 0    Years of education: Not on file    Highest education level: Not on file   Occupational History    Occupation: psychiatrist     Comment: retired   Tobacco Use    Smoking status: Never    Smokeless tobacco: Never   Vaping Use    Vaping status: Never Used   Substance and Sexual Activity    Alcohol use: Not Currently     Comment: rare    Drug use: Never    Sexual activity: Not Currently   Other Topics Concern     Service Not Asked    Blood Transfusions No    Caffeine Concern Yes     Comment: tea, 1 cup    Occupational Exposure Not Asked    Hobby Hazards  Not Asked    Sleep Concern Not Asked    Stress Concern Not Asked    Weight Concern Not Asked    Special Diet Not Asked    Back Care Not Asked    Exercise No    Bike Helmet Not Asked    Seat Belt Not Asked    Self-Exams Not Asked   Social History Narrative    Lives alone    Feels safe    No hx of abuse     Social Determinants of Health     Financial Resource Strain: Not At Risk (7/6/2022)    [facility], [facility]    Financial Resource Strain     How hard is it for you to pay for the very basics like food, housing, medical care, and heating?: Not hard at all   Food Insecurity: No Food Insecurity (7/6/2022)    Received from [facility], [facility]    Food Insecurity     Currently or in the past 3 months, have you worried your food would run out before you had money to buy more?: No     In the past 12 months, have you run out of food or been unable to get more?: No   Transportation Needs: Unmet Transportation Needs (7/6/2022)    Received from [facility], [facility]    Transportation Needs     Currently or in the past 3 months, has lack of transportation kept you from medical appointments, getting food or medicine, or providing care to a family member?: Not on file     Has the lack of transportation kept you from meetings, work, or from getting things needed for daily living?: Not on file     Medical Transportation Needs?: Yes     Daily Living Transportation Needs? [Peds Only] : Yes   Physical Activity: Not on file   Stress: Not on file   Social Connections: Not At Risk (7/6/2022)    Received from [facility], [facility]    Social Connections     In a typical week, how many times do you talk on the phone with family, friends, or neighbors?: Once a week   Housing Stability: Low Risk  (7/15/2023)    Received from [facility], [facility]  [facility]    Housing Stability     Mortgage Payment Concerns?: Not on file     Number of Places Lived in the Last Year: Not on file     Unstable Housing?: Not on file       Medications reviewed. See active list.     /74   Ht 64\"   Wt 145 lb 14.4 oz (66.2 kg)   BMI 25.04 kg/m²       Exam:   GENERAL: well developed, well nourished, in no apparent distress        A/P: Patient is 68 year old female     1. Thickened endometrium  Plan 11/20/2024 hysteroscopy and possible endometrial polypectomy  I discussed with the patient  the procedure including the preop/procedure/postop course and the risks of  bleeding, infection, damage to internal organs.  All questions were answered, and written information was given to the pt regarding the procedure.  Patient plans to use cytotec prior to the procedure.   Rosmery Morfin MD

## 2024-10-08 NOTE — TELEPHONE ENCOUNTER
Scheduled 11/20 130pm    ----- Message from Rosmery Morfin sent at 10/8/2024 10:51 AM CDT -----  Regarding: schedule surgery 11/20/2024    Please schedule the following surgery:    Procedure: hysteroscopy with possible endometrial polypectomy  Assist: (Y/N or none) none  Date:      11/20/2024                                Time Requested:   Dx: PMB, thickened endometrium  Pre-op appt: (Y/N or n/a)  Admission type: (IN/OUT/OBVS) outpt  Department of discharge(SDS/Floor): SDS  Expected length of stay:  Procedure length time (please enter amount you are requesting): 1 hour  Recovery time (patients always ask):  Medical Clearance: (Y/N)  Post- Op f/u appt time frame:     Pre-op orders (choose one)   X Use [facility] procedure driven order set in addition to anesthesia protocol   Use [facility] surgeon's personalized order set   Surgeon to enter pre-op orders   No pre-op orders   Use the prophylactic antibiotic protocol   No pre-op antibiotic orders indicated do not give antibiotic, if any, listed on the procedure driven order sets or personalized order sets    PCN allergy Yes___ or No___   If Yes: Proceed with PCN/Cephalosporin recommended antibiotic as benefit outweighs risk     Proceed with PCN/Cephalosporin recommended antibiotic as not a true allergy    Proceed with recommended alternative antibiotic for PCN allergy        ALL Medicaid/including BCBS community: Tubal/ Hyst form MUST be signed (30 days):      Message to nurses:

## 2024-10-09 ENCOUNTER — HOSPITAL ENCOUNTER (OUTPATIENT)
Dept: ULTRASOUND IMAGING | Age: 69
Discharge: HOME | End: 2024-10-09
Payer: MEDICARE

## 2024-10-09 ENCOUNTER — PATIENT MESSAGE (OUTPATIENT)
Facility: CLINIC | Age: 69
End: 2024-10-09

## 2024-10-09 DIAGNOSIS — K76.89 LIVER CYST: ICD-10-CM

## 2024-10-09 DIAGNOSIS — K80.20 CALCULUS OF GALLBLADDER WITHOUT CHOLECYSTITIS WITHOUT OBSTRUCTION: ICD-10-CM

## 2024-10-09 PROCEDURE — 76705 ECHO EXAM OF ABDOMEN: CPT

## 2024-10-16 ENCOUNTER — OFFICE VISIT (OUTPATIENT)
Dept: PHYSICAL MEDICINE AND REHAB | Facility: CLINIC | Age: 69
End: 2024-10-16
Payer: MEDICARE

## 2024-10-16 ENCOUNTER — TELEPHONE (OUTPATIENT)
Dept: PHYSICAL MEDICINE AND REHAB | Facility: CLINIC | Age: 69
End: 2024-10-16

## 2024-10-16 VITALS — BODY MASS INDEX: 24.75 KG/M2 | WEIGHT: 145 LBS | HEIGHT: 64 IN

## 2024-10-16 DIAGNOSIS — M54.12 CERVICAL RADICULOPATHY: ICD-10-CM

## 2024-10-16 DIAGNOSIS — M54.16 LUMBAR RADICULOPATHY: ICD-10-CM

## 2024-10-16 DIAGNOSIS — M54.2 NECK PAIN: ICD-10-CM

## 2024-10-16 DIAGNOSIS — K21.00 GASTROESOPHAGEAL REFLUX DISEASE WITH ESOPHAGITIS WITHOUT HEMORRHAGE: ICD-10-CM

## 2024-10-16 DIAGNOSIS — K44.9 HIATAL HERNIA: ICD-10-CM

## 2024-10-16 DIAGNOSIS — F41.9 ANXIETY: ICD-10-CM

## 2024-10-16 DIAGNOSIS — M51.9 LUMBAR DISC DISEASE: ICD-10-CM

## 2024-10-16 DIAGNOSIS — M35.00 SJOGREN'S SYNDROME, WITH UNSPECIFIED ORGAN INVOLVEMENT (HCC): ICD-10-CM

## 2024-10-16 DIAGNOSIS — M50.90 CERVICAL DISC DISEASE: Primary | ICD-10-CM

## 2024-10-16 DIAGNOSIS — M54.12 CERVICAL RADICULOPATHY: Primary | ICD-10-CM

## 2024-10-16 DIAGNOSIS — G43.709 CHRONIC MIGRAINE W/O AURA W/O STATUS MIGRAINOSUS, NOT INTRACTABLE: ICD-10-CM

## 2024-10-16 PROCEDURE — 99214 OFFICE O/P EST MOD 30 MIN: CPT | Performed by: PHYSICAL MEDICINE & REHABILITATION

## 2024-10-16 NOTE — PROGRESS NOTES
[facility]    Chief Complaint:    Chief Complaint   Patient presents with    Follow - Up     LOV 24 pt is here for a follow up . Was given a Right L4 transforaminal epidural steroid injection  and received 70% .Reports having sharp pain in her face ( left eye, gums and temple)  No N/T. Not taking any pain meds or muscle relaxer's. No current physical therapy. Pain 4/10     Nursing note reviewed and verified.    Patient was last seen on 2024.  I did the right L5 TFESI which has helped the peroneum area pain and the right groin pain, but she is still having the right buttock burning pain.  The buttock pain is a constant burning pain and is worse with sitting.  She still has right hip adductor and flexion weakness.      Then about one months ago, she developed left sided headaches which will become facial pain when she had some medication adjustments.  These have been rectified and she has gotten some better but she is still having headaches which are not improved wit the Imitrex and Emgaliti.  The pain that has not improved is located in the left eye which can be a sharp pain, left upper gum, left cheek, and left temple area.  The eye pain is constant and is worse with head rotation and left lateal bending.  She has continued to have left arm weakness, but she has not been able to make it to PT yet.  She denies numbness and tingling.      Past Medical History   Past Medical History:    Arthritis    Autoimmune disorder (HCC)    Lupus    Cervical disc displacement    C4-C5, C5-C6 disc displacement    Chronic pain    Right foot after  foot surgery    Depression    Daughter  (adopted)    Difficulty urinating    Hiatal hernia with gastroesophageal reflux    Hypothyroidism    Infertility    Interstitial cystitis    Lupus    Migraine    Migraines    Peripheral neuropathy    Reactive depression (situational)    when daughter     Scalp psoriasis    SI (sacroiliac) joint dysfunction    Sjogren's  disease (HCC)    Traumatic brain injury (HCC)    Tremor    Trigeminal neuralgia pain    Vaginal stricture    Vasculitis (HCC)    nose and sinus    Vertigo       Past Surgical History   Past Surgical History:   Procedure Laterality Date    Arthroscopy, shoulder, surgical; w/rotator cuff repair Right 2012    subclavian reconstruction      Biopsy of skin lesion  09/2020    Cataract extraction Bilateral 2021    Colonoscopy  2012    Correct bunion,simple Right 2004    Egd  2012    Foot fracture surgery Right 2020    Other surgical history  2005    heel: sural nerve neuroma excision -- achilles tendon accidentally injured during surgery       Family History   Family History   Problem Relation Age of Onset    Hypertension Mother     Obesity Mother     Depression Mother     Dementia Father         dementia induced by MRSA sepsis (cause of death)    Infectious Disease Father         MRSA sepsis (cause of death)    Fibromyalgia Sister     Alcohol and Other Disorders Associated Brother         alcoholism    Depression Brother     Other (drug use) Brother     Breast Cancer Maternal Aunt 65    Depression Other         family h/o    Ovarian Cancer Neg     Colon Cancer Neg        Social History   Social History     Socioeconomic History    Marital status: Single     Spouse name: Not on file    Number of children: 0    Years of education: Not on file    Highest education level: Not on file   Occupational History    Occupation: psychiatrist     Comment: retired   Tobacco Use    Smoking status: Never    Smokeless tobacco: Never    Tobacco comments:     Never even tried   Vaping Use    Vaping status: Never Used   Substance and Sexual Activity    Alcohol use: Not Currently     Comment: rare    Drug use: Never    Sexual activity: Not Currently   Other Topics Concern     Service Not Asked    Blood Transfusions No    Caffeine Concern Yes     Comment: tea, 1 cup    Occupational Exposure Not Asked    Hobby Hazards Not Asked    Sleep  Concern Not Asked    Stress Concern Not Asked    Weight Concern Not Asked    Special Diet Not Asked    Back Care Not Asked    Exercise No    Bike Helmet Not Asked    Seat Belt Not Asked    Self-Exams Not Asked   Social History Narrative    Lives alone    Feels safe    No hx of abuse     Social Drivers of Health     Financial Resource Strain: Not At Risk (7/6/2022)    [facility]    Financial Resource Strain     How hard is it for you to pay for the very basics like food, housing, medical care, and heating?: Not hard at all   Food Insecurity: No Food Insecurity (7/6/2022)    [facility], [facility]    Food Insecurity     Currently or in the past 3 months, have you worried your food would run out before you had money to buy more?: No     In the past 12 months, have you run out of food or been unable to get more?: No   Transportation Needs: Unmet Transportation Needs (7/6/2022)    [facility], [facility]    Transportation Needs     Currently or in the past 3 months, has lack of transportation kept you from medical appointments, getting food or medicine, or providing care to a family member?: Not on file     Has the lack of transportation kept you from meetings, work, or from getting things needed for daily living?: Not on file     Medical Transportation Needs?: Yes     Daily Living Transportation Needs? [Peds Only] : Yes   Physical Activity: Not on file   Stress: Not on file   Social Connections: Not At Risk (7/6/2022)    [facility]    Social Connections     In a typical week, how many times do you talk on the phone with family, friends, or neighbors?: Once a week   Housing Stability: Low Risk  (7/15/2023)    [facility], [facility]     Housing Stability     Mortgage Payment Concerns?: Not on file     Number of Places Lived in the Last Year: Not on file     Unstable Housing?: Not on file       PE:  The patient does appear in her stated age in no distress.  The patient is well groomed.    Psychiatric:  The patient is alert and oriented x 3.  The patient has a normal affect and mood.      Respiratory:  No acute respiratory distress. Patient does not have a cough.    HEENT:  Extraocular muscles are intact. There is no kern icterus. Pupils are equal, round, and reactive to light. No redness or discharge bilaterally.    Skin:  There are no rashes or lesions.    Lymph Nodes:  The patient has no palpable submandibular, supraclavicular, and cervical lymph nodes..    Vitals:  There were no vitals filed for this visit.    Cervical Spine:    Posture: mild-moderate chin forward superiorly rotated protracted shoulder posture.   Shoulders: Level   Head: In neutral   Spinous Processes Palpations: Tender at C7   Z-Joints Palpations: Tender at  left C5-6 and right C6-7   Muscular Palpations: Non-tender to palpation.     Lumbar Spine Palpation:    Spinous Processes: Non-tender for all Spinous Processes   Z-joints: Non-tender for all Z-joints   SIJ: Tender at right SIJ   Piriformis Muscle: Non-tender bilateral Piriformis muscles   Greater Trochanteric Bursa: Non-tender for bilateral Greater Trochanteric Bursa     Lumbar Spine:    Scoliosis: No scoliosis present     Vascular upper extremity:   Right radial pulses: 2+   Left radial pulses: 2+      Vascular lower extremity:   Dorsalis pedis pulse-RIGHT 2+   Dorsalis pedis pulse-LEFT 2+   Tibialis posterior pulse-RIGHT 2+   Tibialis posterior pulse-LEFT 2+      Neurological Upper Extremity:    Light Touch: Intact in Bilateral upper extremities.   Pin Prick: Not tested.   UE Muscle Strength: All Upper Extremity strength measurements 5/5 except:  ABP Right: 4+/5  ABP Left: 4+/5  FDI Left: 4+/5  ADM Left: 4/5  EIP Left:  4+/5  Triceps Left: 4/5   Reflexes: 2+ In the bilateral upper extremities.   Escoto's sign Right: Negative   Escoto's sigh Left: Negative     Neurological Lower Extremity:    Light Touch Sensation: Intact in bilateral Lower Extremities   LE Muscle Strength: All LE strength measurements 5/5 except:  Hamstring RIGHT:   4/5  Hip Flexion RIGHT:  4/5   RIGHT plantar reflexes: downward response   LEFT plantar reflexes: downward response   Reflexes: 2+ in bilateral lower extremities     Assessment  1. C6-7 left > right mild-mod diffuse, C5-6 mild-mod central, C4-5 mild central, C3-4 left mild foraminal bulging discs    2. Chronic migraine w/o aura w/o status migrainosus, not intractable    3. L5-S1 right mild foraminal bulging disc    4. right L3-4 radiculopathy clinically and chronic right L5 radiculopathy on EMG    5. Sjogren's syndrome, with unspecified organ involvement (HCC)    6. Anxiety    7. Gastroesophageal reflux disease with esophagitis without hemorrhage    8. Hiatal hernia    9. Neck pain    10. left C7-8 radiculopathy        Plan  I will do a left C7-T1 ILESI.    If she does well with these, the she might need to have a right L3 TFESI in the future.    She will get into the PT once she is able for the cervical and lumbar spines.    She will hold on starting the Naltrexone for now.    The patient will follow up in 3 months, but the patient will call me 2 weeks after having the injection to let me know how the injection worked.    The patient understands and agrees with the stated plan.    Horacio Sadler MD  10/16/2024

## 2024-10-16 NOTE — TELEPHONE ENCOUNTER
Per CMS Guidelines -no authorization is required for Left C7-T1 ILESI   CPT code: 14947       Status: Authorization is not required based on medical necessity however is not a guarantee of payment and may be subject to review once claim is submitted-Covered Benefit.  This will be #4 in a rolling 12 months

## 2024-10-16 NOTE — PATIENT INSTRUCTIONS
Plan  I will do a left C7-T1 ILESI.    If she does well with these, the she might need to have a right L3 TFESI in the future.    She will get into the PT once she is able for the cervical and lumbar spines.    She will hold on starting the Naltrexone for now.    The patient will follow up in 3 months, but the patient will call me 2 weeks after having the injection to let me know how the injection worked.

## 2024-10-18 ENCOUNTER — PATIENT MESSAGE (OUTPATIENT)
Dept: OBGYN CLINIC | Facility: CLINIC | Age: 69
End: 2024-10-18

## 2024-10-18 DIAGNOSIS — N95.0 POSTMENOPAUSAL BLEEDING: ICD-10-CM

## 2024-10-18 DIAGNOSIS — N95.0 PMB (POSTMENOPAUSAL BLEEDING): Primary | ICD-10-CM

## 2024-10-18 DIAGNOSIS — R93.89 THICKENED ENDOMETRIUM: ICD-10-CM

## 2024-10-18 NOTE — TELEPHONE ENCOUNTER
Patient was notified of 4 procedures in a rolling 12 month period. If needs any injections, she may move forward with self pay. CPT Codes of: 78525, 52494 given to patient to confirm with [facility] of pricing.     Patient has been scheduled for C7-T1 Interlaminar epidural steroid injection on 11/1/24 at the [facility] with Dr. Sadler.   Anesthesia type:  Local  Please note: The [facility] will call the business day prior to discuss the exact time/arrival and additional instructions for your appointment.  Patient was advised that if he/she does receive the covid vaccine it needs to be at least 2 weeks before or after the injection.  Medications and allergies reviewed.  Educated to hold NSAIDS (Aleve, Ibuprofen, Motrin, Advil) and anti-inflammatories (Meloxicam, Naproxen, Diclofenac, Celebrex) and for cervical injections must hold Multi-Vitamins, Vitamin E, Fish Oil/Omega-3.  If patient is receiving MAC/IVCS, weight loss oral/injectable medications will need to be held for 7 days prior to injection.  Patient informed to fast 8 hours prior to procedure and 10-12 hours prior to procedure with IVCS/MAC if patient is on a weight loss medication.   If on blood thinner, clearance has been received and approved to hold this medication by provider.   Patient informed of [facility]'s  policy:  The patient will require transportation arrangements to and from the procedure, with the  present on site for the entire visit.  Without a , the appointment is subject to cancellation.    [facility] is located in the [facility] [address] [address].   may park in the yellow/purple parking lot.  Patient verbalized understanding and agrees with plan.  Scheduled in Epic: Yes  Scheduled in Surgical Case: Yes  Follow up appointment made: NOV: 2/17/2025 Horacio Sadler MD

## 2024-10-30 ENCOUNTER — MED REC SCAN ONLY (OUTPATIENT)
Dept: PHYSICAL MEDICINE AND REHAB | Facility: CLINIC | Age: 69
End: 2024-10-30

## 2024-11-04 ENCOUNTER — TELEPHONE (OUTPATIENT)
Dept: NEUROLOGY | Facility: CLINIC | Age: 69
End: 2024-11-04

## 2024-11-04 NOTE — TELEPHONE ENCOUNTER
Returned PC to the patient re: message below.    Juror#: [id number]  Date of Service: 11/26/24  [facility]    Message routed to Dr. Irving to advise.

## 2024-11-04 NOTE — TELEPHONE ENCOUNTER
Pt called in advised she got a letter to go do jury duty but pt is looking to get excused due to health. Advised she needs a letter from dr Irving advising of diagnosis and medications pt is taking. Pt requesting for letter to be uploaded via [employer] for her to access. Pls advise.

## 2024-11-04 NOTE — TELEPHONE ENCOUNTER
Letter written per Dr. Irving and sent to the patient over [employer] as requested.

## 2024-11-05 NOTE — DISCHARGE INSTRUCTIONS
What happens after hysteroscopy?  You may have cramps and bleeding for short time after the procedure. This is normal. Use pads instead of tampons.  Pelvic rest: Nothing in the vagina- no sex, no tampons, no douching, no vaginal medication    When to call your healthcare provider  Call your healthcare provider if any of the following occur:  Heavy bleeding (more than 1 pad an hour for 2 or more hours)  A fever of 100.4°F ( 38.0°C) or higher, or as directed by your provider  Increasing belly (abdominal) pain or soreness  Bad-smelling discharge       HOME INSTRUCTIONS  [facility] HOME CARE INSTRUCTIONS: POST-OP ANESTHESIA  The medication that you received for sedation or general anesthesia can last up to 24 hours. Your judgment and reflexes may be altered, even if you feel like your normal self.      We Recommend:   Do not drive any motor vehicle or bicycle   Avoid mowing the lawn, playing sports, or working with power tools/applicances (power saws, electric knives or mixers)   That you have someone stay with you on your first night home   Do not drink alcohol or take sleeping pills or tranquilizers   Do not sign legal documents within 24 hours of your procedure   If you had a nerve block for your surgery, take extra care not to put any pressure on your arm or hand for 24 hours    It is normal:  For you to have a sore throat if you had a breathing tube during surgery (while you were asleep!). The sore throat should get better within 48 hours. You can gargle with warm salt water (1/2 tsp in 4 oz warm water) or use a throat lozenge for comfort  To feel muscle aches or soreness especially in the abdomen, chest or neck. The achy feeling should go away in the next 24 hours  To feel weak, sleepy or \"wiped out\". Your should start feeling better in the next 24 hours.   To experience mild discomforts such as sore lip or tongue, headache, cramps, gas pains or a bloated feeling in your abdomen.   To experience mild back pain or  soreness for a day or two if you had spinal or epidural anesthesia.   If you had laparoscopic surgery, to feel shoulder pain or discomfort on the day of surgery.   For some patients to have nausea after surgery/anesthesia    If you feel nausea or experience vomiting:   Try to move around less.   Eat less than usual or drink only liquids until the next morning   Nausea should resolve in about 24 hours    If you have a problem when you are at home:    Call your surgeons office   Discharge Instructions: After Your Surgery  You’ve just had surgery. During surgery, you were given medicine called anesthesia to keep you relaxed and free of pain. After surgery, you may have some pain or nausea. This is common. Here are some tips for feeling better and getting well after surgery.   Going home  Your healthcare provider will show you how to take care of yourself when you go home. They'll also answer your questions. Have an adult family member or friend drive you home. For the first 24 hours after your surgery:   Don't drive or use heavy equipment.  Don't make important decisions or sign legal papers.  Take medicines as directed.  Don't drink alcohol.  Have someone stay with you, if needed. They can watch for problems and help keep you safe.  Be sure to go to all follow-up visits with your healthcare provider. And rest after your surgery for as long as your provider tells you to.   Coping with pain  If you have pain after surgery, pain medicine will help you feel better. Take it as directed, before pain becomes severe. Also, ask your healthcare provider or pharmacist about other ways to control pain. This might be with heat, ice, or relaxation. And follow any other instructions your surgeon or nurse gives you.      Stay on schedule with your medicine.     Tips for taking pain medicine  To get the best relief possible, remember these points:   Pain medicines can upset your stomach. Taking them with a little food may help.  Most  pain relievers taken by mouth need at least 20 to 30 minutes to start to work.  Don't wait till your pain becomes severe before you take your medicine. Try to time your medicine so that you can take it before starting an activity. This might be before you get dressed, go for a walk, or sit down for dinner.  Constipation is a common side effect of some pain medicines. Call your healthcare provider before taking any medicines such as laxatives or stool softeners to help ease constipation. Also ask if you should skip any foods. Drinking lots of fluids and eating foods such as fruits and vegetables that are high in fiber can also help. Remember, don't take laxatives unless your surgeon has prescribed them.  Drinking alcohol and taking pain medicine can cause dizziness and slow your breathing. It can even be deadly. Don't drink alcohol while taking pain medicine.  Pain medicine can make you react more slowly to things. Don't drive or run machinery while taking pain medicine.  Your healthcare provider may tell you to take acetaminophen to help ease your pain. Ask them how much you're supposed to take each day. Acetaminophen or other pain relievers may interact with your prescription medicines or other over-the-counter (OTC) medicines. Some prescription medicines have acetaminophen and other ingredients in them. Using both prescription and OTC acetaminophen for pain can cause you to accidentally overdose. Read the labels on your OTC medicines with care. This will help you to clearly know the list of ingredients, how much to take, and any warnings. It may also help you not take too much acetaminophen. If you have questions or don't understand the information, ask your pharmacist or healthcare provider to explain it to you before you take the OTC medicine.   Managing nausea  Some people have an upset stomach (nausea) after surgery. This is often because of anesthesia, pain, or pain medicine, less movement of food in the  stomach, or the stress of surgery. These tips will help you handle nausea and eat healthy foods as you get better. If you were on a special food plan before surgery, ask your healthcare provider if you should follow it while you get better. Check with your provider on how your eating should progress. It may depend on the surgery you had. These general tips may help:   Don't push yourself to eat. Your body will tell you when to eat and how much.  Start off with clear liquids and soup. They're easier to digest.  Next try semi-solid foods as you feel ready. These include mashed potatoes, applesauce, and gelatin.  Slowly move to solid foods. Don’t eat fatty, rich, or spicy foods at first.  Don't force yourself to have 3 large meals a day. Instead eat smaller amounts more often.  Take pain medicines with a small amount of solid food, such as crackers or toast. This helps prevent nausea.  When to call your healthcare provider  Call your healthcare provider right away if you have any of these:   You still have too much pain, or the pain gets worse, after taking the medicine. The medicine may not be strong enough. Or there may be a complication from the surgery.  You feel too sleepy, dizzy, or groggy. The medicine may be too strong.  Side effects such as nausea or vomiting. Your healthcare provider may advise taking other medicines to .  Skin changes such as rash, itching, or hives. This may mean you have an allergic reaction. Your provider may advise taking other medicines.  The incision looks different (for instance, part of it opens up).  Bleeding or fluid leaking from the incision site, and weren't told to expect that.  Fever of 100.4°F (38°C) or higher, or as directed by your provider.  Call 911  Call 911 right away if you have:   Trouble breathing  Facial swelling    If you have obstructive sleep apnea   You were given anesthesia medicine during surgery to keep you comfortable and free of pain. After surgery, you may  have more apnea spells because of this medicine and other medicines you were given. The spells may last longer than normal.    At home:  Keep using the continuous positive airway pressure (CPAP) device when you sleep. Unless your healthcare provider tells you not to, use it when you sleep, day or night. CPAP is a common device used to treat obstructive sleep apnea.  Talk with your provider before taking any pain medicine, muscle relaxants, or sedatives. Your provider will tell you about the possible dangers of taking these medicines.  Contact your provider if your sleeping changes a lot even when taking medicines as directed.  [employer] last reviewed this educational content on 10/1/2021  © 9414-0116 [employer]. All rights reserved. This information is not intended as a substitute for professional medical care. Always follow your healthcare professional's instructions.

## 2024-11-07 ENCOUNTER — ANESTHESIA (OUTPATIENT)
Dept: SURGERY | Facility: HOSPITAL | Age: 69
End: 2024-11-07
Payer: MEDICARE

## 2024-11-07 ENCOUNTER — HOSPITAL ENCOUNTER (OUTPATIENT)
Facility: HOSPITAL | Age: 69
Setting detail: HOSPITAL OUTPATIENT SURGERY
Discharge: HOME | End: 2024-11-07
Attending: OBSTETRICS & GYNECOLOGY | Admitting: OBSTETRICS & GYNECOLOGY
Payer: MEDICARE

## 2024-11-07 ENCOUNTER — ANESTHESIA EVENT (OUTPATIENT)
Dept: SURGERY | Facility: HOSPITAL | Age: 69
End: 2024-11-07
Payer: MEDICARE

## 2024-11-07 VITALS
SYSTOLIC BLOOD PRESSURE: 129 MMHG | HEART RATE: 66 BPM | BODY MASS INDEX: 24.59 KG/M2 | OXYGEN SATURATION: 98 % | DIASTOLIC BLOOD PRESSURE: 64 MMHG | HEIGHT: 64 IN | WEIGHT: 144 LBS | TEMPERATURE: 97 F | RESPIRATION RATE: 16 BRPM

## 2024-11-07 DIAGNOSIS — R93.89 THICKENED ENDOMETRIUM: ICD-10-CM

## 2024-11-07 DIAGNOSIS — N95.0 PMB (POSTMENOPAUSAL BLEEDING): ICD-10-CM

## 2024-11-07 DIAGNOSIS — K21.9 GASTROESOPHAGEAL REFLUX DISEASE, UNSPECIFIED WHETHER ESOPHAGITIS PRESENT: ICD-10-CM

## 2024-11-07 PROCEDURE — 58558 HYSTEROSCOPY BIOPSY: CPT | Performed by: OBSTETRICS & GYNECOLOGY

## 2024-11-07 PROCEDURE — 0UB98ZZ EXCISION OF UTERUS, VIA NATURAL OR ARTIFICIAL OPENING ENDOSCOPIC: ICD-10-PCS | Performed by: OBSTETRICS & GYNECOLOGY

## 2024-11-07 RX ORDER — NALOXONE HYDROCHLORIDE 0.4 MG/ML
0.08 INJECTION, SOLUTION INTRAMUSCULAR; INTRAVENOUS; SUBCUTANEOUS AS NEEDED
Status: DISCONTINUED | OUTPATIENT
Start: 2024-11-07 | End: 2024-11-07

## 2024-11-07 RX ORDER — HYDROMORPHONE HYDROCHLORIDE 1 MG/ML
0.4 INJECTION, SOLUTION INTRAMUSCULAR; INTRAVENOUS; SUBCUTANEOUS EVERY 5 MIN PRN
Status: DISCONTINUED | OUTPATIENT
Start: 2024-11-07 | End: 2024-11-07

## 2024-11-07 RX ORDER — ONDANSETRON 2 MG/ML
INJECTION INTRAMUSCULAR; INTRAVENOUS AS NEEDED
Status: DISCONTINUED | OUTPATIENT
Start: 2024-11-07 | End: 2024-11-07 | Stop reason: SURG

## 2024-11-07 RX ORDER — LIDOCAINE HYDROCHLORIDE 10 MG/ML
INJECTION, SOLUTION EPIDURAL; INFILTRATION; INTRACAUDAL; PERINEURAL AS NEEDED
Status: DISCONTINUED | OUTPATIENT
Start: 2024-11-07 | End: 2024-11-07 | Stop reason: SURG

## 2024-11-07 RX ORDER — MORPHINE SULFATE 10 MG/ML
6 INJECTION, SOLUTION INTRAMUSCULAR; INTRAVENOUS EVERY 10 MIN PRN
Status: DISCONTINUED | OUTPATIENT
Start: 2024-11-07 | End: 2024-11-07

## 2024-11-07 RX ORDER — ONDANSETRON 2 MG/ML
4 INJECTION INTRAMUSCULAR; INTRAVENOUS EVERY 6 HOURS PRN
Status: DISCONTINUED | OUTPATIENT
Start: 2024-11-07 | End: 2024-11-07

## 2024-11-07 RX ORDER — METOCLOPRAMIDE HYDROCHLORIDE 5 MG/ML
10 INJECTION INTRAMUSCULAR; INTRAVENOUS EVERY 8 HOURS PRN
Status: DISCONTINUED | OUTPATIENT
Start: 2024-11-07 | End: 2024-11-07

## 2024-11-07 RX ORDER — ETOMIDATE 2 MG/ML
INJECTION INTRAVENOUS AS NEEDED
Status: DISCONTINUED | OUTPATIENT
Start: 2024-11-07 | End: 2024-11-07 | Stop reason: SURG

## 2024-11-07 RX ORDER — DEXAMETHASONE SODIUM PHOSPHATE 4 MG/ML
VIAL (ML) INJECTION AS NEEDED
Status: DISCONTINUED | OUTPATIENT
Start: 2024-11-07 | End: 2024-11-07 | Stop reason: SURG

## 2024-11-07 RX ORDER — SODIUM CHLORIDE, SODIUM LACTATE, POTASSIUM CHLORIDE, CALCIUM CHLORIDE 600; 310; 30; 20 MG/100ML; MG/100ML; MG/100ML; MG/100ML
INJECTION, SOLUTION INTRAVENOUS CONTINUOUS
Status: DISCONTINUED | OUTPATIENT
Start: 2024-11-07 | End: 2024-11-07

## 2024-11-07 RX ORDER — MORPHINE SULFATE 4 MG/ML
2 INJECTION, SOLUTION INTRAMUSCULAR; INTRAVENOUS EVERY 10 MIN PRN
Status: DISCONTINUED | OUTPATIENT
Start: 2024-11-07 | End: 2024-11-07

## 2024-11-07 RX ORDER — HYDROMORPHONE HYDROCHLORIDE 1 MG/ML
0.6 INJECTION, SOLUTION INTRAMUSCULAR; INTRAVENOUS; SUBCUTANEOUS EVERY 5 MIN PRN
Status: DISCONTINUED | OUTPATIENT
Start: 2024-11-07 | End: 2024-11-07

## 2024-11-07 RX ORDER — ONDANSETRON 4 MG/1
4 TABLET, FILM COATED ORAL EVERY 8 HOURS PRN
Status: DISCONTINUED | OUTPATIENT
Start: 2024-11-07 | End: 2024-11-07

## 2024-11-07 RX ORDER — ONDANSETRON 2 MG/ML
4 INJECTION INTRAMUSCULAR; INTRAVENOUS EVERY 8 HOURS PRN
Status: DISCONTINUED | OUTPATIENT
Start: 2024-11-07 | End: 2024-11-07

## 2024-11-07 RX ORDER — ACETAMINOPHEN 500 MG
1000 TABLET ORAL ONCE
Status: COMPLETED | OUTPATIENT
Start: 2024-11-07 | End: 2024-11-07

## 2024-11-07 RX ORDER — HYDROMORPHONE HYDROCHLORIDE 1 MG/ML
0.2 INJECTION, SOLUTION INTRAMUSCULAR; INTRAVENOUS; SUBCUTANEOUS EVERY 5 MIN PRN
Status: DISCONTINUED | OUTPATIENT
Start: 2024-11-07 | End: 2024-11-07

## 2024-11-07 RX ORDER — KETOROLAC TROMETHAMINE 15 MG/ML
15 INJECTION, SOLUTION INTRAMUSCULAR; INTRAVENOUS ONCE
Status: COMPLETED | OUTPATIENT
Start: 2024-11-07 | End: 2024-11-07

## 2024-11-07 RX ORDER — PROPRANOLOL HYDROCHLORIDE 10 MG/1
10 TABLET ORAL ONCE
Status: COMPLETED | OUTPATIENT
Start: 2024-11-07 | End: 2024-11-07

## 2024-11-07 RX ORDER — MORPHINE SULFATE 4 MG/ML
4 INJECTION, SOLUTION INTRAMUSCULAR; INTRAVENOUS EVERY 10 MIN PRN
Status: DISCONTINUED | OUTPATIENT
Start: 2024-11-07 | End: 2024-11-07

## 2024-11-07 RX ORDER — SCOLOPAMINE TRANSDERMAL SYSTEM 1 MG/1
1 PATCH, EXTENDED RELEASE TRANSDERMAL ONCE
Status: DISCONTINUED | OUTPATIENT
Start: 2024-11-07 | End: 2024-11-07

## 2024-11-07 RX ORDER — METOPROLOL TARTRATE 25 MG/1
25 TABLET, FILM COATED ORAL ONCE AS NEEDED
Status: DISCONTINUED | OUTPATIENT
Start: 2024-11-07 | End: 2024-11-07 | Stop reason: HOSPADM

## 2024-11-07 RX ADMIN — ONDANSETRON 4 MG: 2 INJECTION INTRAMUSCULAR; INTRAVENOUS at 15:52:00

## 2024-11-07 RX ADMIN — DEXAMETHASONE SODIUM PHOSPHATE 8 MG: 4 MG/ML VIAL (ML) INJECTION at 15:52:00

## 2024-11-07 RX ADMIN — LIDOCAINE HYDROCHLORIDE 50 MG: 10 INJECTION, SOLUTION EPIDURAL; INFILTRATION; INTRACAUDAL; PERINEURAL at 15:39:00

## 2024-11-07 RX ADMIN — ETOMIDATE 10 MG: 2 INJECTION INTRAVENOUS at 15:39:00

## 2024-11-07 NOTE — ANESTHESIA PROCEDURE NOTES
Airway  Date/Time: 11/7/2024 3:42 PM  Urgency: Elective    Airway not difficult    General Information and Staff    Patient location during procedure: OR  Anesthesiologist: Dane Brian MD  Performed: anesthesiologist   Performed by: Dane Brian MD  Authorized by: Dane Brian MD      Indications and Patient Condition  Indications for airway management: anesthesia  Sedation level: deep  Preoxygenated: yes  Patient position: sniffing  MILS not maintained throughout  Mask difficulty assessment: 1 - vent by mask    Final Airway Details  Final airway type: endotracheal airway      Successful airway: ETT  Cuffed: yes   Successful intubation technique: Video laryngoscopy  Endotracheal tube insertion site: oral  Blade: GlideScope  Blade size: #3  ETT size (mm): 7.0    Cormack-Lehane Classification: grade I - full view of glottis  Placement verified by: capnometry   Measured from: teeth  ETT to teeth (cm): 19  Number of attempts at approach: 1  Number of other approaches attempted: 0

## 2024-11-07 NOTE — BRIEF OP NOTE
Pre-Operative Diagnosis: PMB (postmenopausal bleeding) [N95.0]  Thickened endometrium [R93.89]     Post-Operative Diagnosis: * No post-op diagnosis entered *      Procedure Performed:   Hysteroscopy with possible endometrial polypectomy    Surgeons and Role:     * Rosmery Morfin MD - Primary    Assistant(s):        Surgical Findings: Multiple endometrial polyps removed in their entirety.      Specimen: endometrial curetting to path     Estimated Blood Loss: No data recorded      Rosmery Morfin MD  11/7/2024  3:13 PM

## 2024-11-07 NOTE — H&P
GYN H&P     2024  8:54 AM    CC: Patient is here for PMB     HPI: Patient is a 68 year old  for PMB and thickened endometrium on USN.       No LMP recorded. Patient is postmenopausal.    OB History    Para Term  AB Living   3       3     SAB IAB Ectopic Multiple Live Births   2   1          # Outcome Date GA Lbr Alejandro/2nd Weight Sex Type Anes PTL Lv   3 Ectopic               Birth Comments: occurred while in [place] -- no surgery No blood transfusion   2 SAB            1 SAB               Obstetric Comments   Pt cannot recall how many sab had; had IVF       GYN hx:             Past Medical History:    Arthritis    Autoimmune disorder (HCC)    Lupus    Cervical disc displacement    C4-C5, C5-C6 disc displacement    Chronic pain    Right foot after  foot surgery    Depression    Daughter  (adopted)    Difficulty urinating    Hearing impaired person, bilateral    Hiatal hernia with gastroesophageal reflux    Hypothyroidism    Infertility    Interstitial cystitis    Long COVID    Lupus    Migraine    Migraines    Peripheral neuropathy    PONV (postoperative nausea and vomiting)    Reactive depression (situational)    when daughter     Scalp psoriasis    SI (sacroiliac) joint dysfunction    Sjogren's disease (HCC)    Traumatic brain injury (HCC)    Tremor    Trigeminal neuralgia pain    Vaginal stricture    Vasculitis (HCC)    nose and sinus    Vertigo     Past Surgical History:   Procedure Laterality Date    Arthroscopy, shoulder, surgical; w/rotator cuff repair Right     subclavian reconstruction      Biopsy of skin lesion  2020    Cataract extraction Bilateral     Colonoscopy  2012    Correct bunion,simple Right     Egd  2012    Foot fracture surgery Right     Other surgical history      heel: sural nerve neuroma excision -- achilles tendon accidentally injured during surgery     Allergies[1]  Family History   Problem Relation Age of Onset    Hypertension  Mother     Obesity Mother     Depression Mother     Dementia Father         dementia induced by MRSA sepsis (cause of death)    Infectious Disease Father         MRSA sepsis (cause of death)    Fibromyalgia Sister     Alcohol and Other Disorders Associated Brother         alcoholism    Depression Brother     Other (drug use) Brother     Breast Cancer Maternal Aunt 65    Depression Other         family h/o    Ovarian Cancer Neg     Colon Cancer Neg      Social History     Socioeconomic History    Marital status: Single    Number of children: 0   Occupational History    Occupation: psychiatrist     Comment: retired   Tobacco Use    Smoking status: Never    Smokeless tobacco: Never   Vaping Use    Vaping status: Never Used   Substance and Sexual Activity    Alcohol use: Not Currently    Drug use: Never    Sexual activity: Not Currently     Social History     Social History Narrative    Lives alone    Feels safe    No hx of abuse       Medications reviewed. See active list.     Ht 64\"   Wt 140 lb (63.5 kg)   BMI 24.03 kg/m²       Exam:   GENERAL: well developed, well nourished, in no apparent distress  SKIN: no rashes, no suspicious lesions  HEENT: normal  NECK: supple; no thyroidmegaly, no adenopathy  BREASTS: symmetrical, nontender, no palpable masses or nodes, no nipple discharge, no skin changes, no dippling, no palpable axillary adenopathy  ABDOMEN: Soft, non distended; non tender, no masses.  Liver and spleen non-tender, no enlargement. No palpable hernias  GYNE/:  External Genitalia: Normal appearing, no lesions, normal hair distribution   Urethral meatus appear wnl, no abnormal discharge or lesions noted.   Bladder: well supported, urethra wnl, no palpable tenderness or masses, no discharge  Vagina: normal pink mucosa, no lesions, normal clear discharge.   Uterus: midline, mobile, non-tender, firm and smooth  Cervix: pink, no lesions grossly visible, no discharge  Adnexa: non tender, no palpable masses,  normal size  Anus:  No lesions or visible hemorrhoids    Transabdominal and Transvaginal images taken.   The uterus is normal in size and echotexture.   The endometrium is thickened at 10.5 mm with multiple echogenic foci seen- cluster vs mass in left fundal   endometrium measures 0.8 x 0.9 x 0.8 cm .   Both ovaries appear normal in size and echo pattern.     A/P: Patient is 68 year old female with PMB and thickened endometrium on usn presents for hysteroscopy, endometrial sampling.     I discussed with the patient  the procedure including the preop/procedure/postop course and the risks of  bleeding, infection, damage to internal organs.  All questions were answered.    Rosmery Morfin MD            [1]   Allergies  Allergen Reactions    Cyclobenzaprine OTHER (SEE COMMENTS)     Drowsy, nightmares    Gabapentin OTHER (SEE COMMENTS)     Upper and lower extremity ataxia and cognitive impairment     Hydroxychloroquine OTHER (SEE COMMENTS)     eye toxicity    Meloxicam PALPITATIONS, SHORTNESS OF BREATH and TINITUS    Ropinirole OTHER (SEE COMMENTS)     Vertigo     Naproxen OTHER (SEE COMMENTS)    Sulfa Antibiotics HIVES    Trimethoprim HIVES    Biaxin [Clarithromycin] ITCHING    Sulfamethoxazole W/Trimethoprim ITCHING

## 2024-11-07 NOTE — ANESTHESIA PREPROCEDURE EVALUATION
Anesthesia PreOp Note    HPI:     Debbie Peterson is a 68 year old female who presents for preoperative consultation requested by: Rosmery Morfin MD    Date of Surgery: 11/7/2024    Procedure(s):  Hysteroscopy with possible endometrial polypectomy  Indication: PMB (postmenopausal bleeding) [N95.0]  Thickened endometrium [R93.89]    Relevant Problems   No relevant active problems       NPO:  Last Liquid Consumption Date: 11/07/24  Last Liquid Consumption Time: 1100  Last Solid Consumption Date: 11/06/24  Last Solid Consumption Time: 0730 (pt had yogurt and english muffin states she was told that she could eat a light breakfast)  Last Liquid Consumption Date: 11/07/24          History Review:  Patient Active Problem List    Diagnosis Date Noted    Thickened endometrium 10/08/2024    Neck pain 08/05/2024    Postmenopausal bleeding 04/08/2024    Primary osteoarthritis of both hips: mild 02/16/2024    Arthritis of sacroiliac joint: bilateral mild 02/16/2024    L5-S1 right mild foraminal bulging disc 02/16/2024    left C7-8 radiculopathy 02/01/2024    C6-7 left > right mild-mod diffuse, C5-6 mild-mod central, C4-5 mild central, C3-4 left mild foraminal bulging discs 02/01/2024    right L3-4 radiculopathy clinically and chronic right L5 radiculopathy on EMG 02/01/2024    Pelvic pain 02/01/2024    Anxiety 05/30/2023    Hiatal hernia 08/22/2022    Cervical neuritis 04/20/2022    Sedative, hypnotic or anxiolytic use, unspecified with unspecified sedative, hypnotic or anxiolytic-induced disorder (HCC) 01/17/2022    Chronic migraine w/o aura w/o status migrainosus, not intractable 08/09/2021    Vitamin D deficiency 08/09/2021    Pain in joint, multiple sites 08/09/2021    Gastroesophageal reflux disease with esophagitis without hemorrhage 05/19/2021    Lupus 12/12/2019    Sjogren's disease (HCC) 12/12/2019    Chronic right-sided low back pain with right-sided sciatica 08/12/2019    Acquired hypothyroidism  2014    Fatigue 2014       Past Medical History:    Arthritis    Autoimmune disorder (HCC)    Lupus    Cervical disc displacement    C4-C5, C5-C6 disc displacement    Chronic pain    Right foot after  foot surgery    Depression    Daughter  (adopted)    Difficulty urinating    Hearing impaired person, bilateral    Hiatal hernia with gastroesophageal reflux    Hypothyroidism    Infertility    Interstitial cystitis    Long COVID    Lupus    Migraine    Migraines    Peripheral neuropathy    PONV (postoperative nausea and vomiting)    Reactive depression (situational)    when daughter     Scalp psoriasis    SI (sacroiliac) joint dysfunction    Sjogren's disease (HCC)    Traumatic brain injury (HCC)    Tremor    Trigeminal neuralgia pain    Vaginal stricture    Vasculitis (HCC)    nose and sinus    Vertigo       Past Surgical History:   Procedure Laterality Date    Arthroscopy, shoulder, surgical; w/rotator cuff repair Right     subclavian reconstruction      Biopsy of skin lesion  2020    Cataract extraction Bilateral     Colonoscopy  2012    Correct bunion,simple Right     Egd  2012    Foot fracture surgery Right     Other surgical history      heel: sural nerve neuroma excision -- achilles tendon accidentally injured during surgery       Prescriptions Prior to Admission[1]  Current Medications and Prescriptions Ordered in Epic[2]    Allergies[3]    Family History   Problem Relation Age of Onset    Hypertension Mother     Obesity Mother     Depression Mother     Dementia Father         dementia induced by MRSA sepsis (cause of death)    Infectious Disease Father         MRSA sepsis (cause of death)    Fibromyalgia Sister     Alcohol and Other Disorders Associated Brother         alcoholism    Depression Brother     Other (drug use) Brother     Breast Cancer Maternal Aunt 65    Depression Other         family h/o    Ovarian Cancer Neg     Colon Cancer Neg      Social  History     Socioeconomic History    Marital status: Single    Number of children: 0   Occupational History    Occupation: psychiatrist     Comment: retired   Tobacco Use    Smoking status: Never    Smokeless tobacco: Never   Vaping Use    Vaping status: Never Used   Substance and Sexual Activity    Alcohol use: Not Currently    Drug use: Never    Sexual activity: Not Currently   Other Topics Concern    Blood Transfusions No    Caffeine Concern Yes     Comment: tea, 1 cup    Exercise No       Available pre-op labs reviewed.             Vital Signs:  Body mass index is 24.72 kg/m².   height is 1.626 m (5' 4\") and weight is 65.3 kg (144 lb). Her oral temperature is 98 °F (36.7 °C). Her blood pressure is 123/74 and her pulse is 77. Her respiration is 16 and oxygen saturation is 98%.   Vitals:    11/06/24 0759 11/07/24 1250   BP:  123/74   Pulse:  77   Resp:  16   Temp:  98 °F (36.7 °C)   TempSrc:  Oral   SpO2:  98%   Weight: 63.5 kg (140 lb) 65.3 kg (144 lb)   Height: 1.626 m (5' 4\")         Anesthesia Evaluation      History of anesthetic complications   Airway   Mallampati: II  Dental      Pulmonary    Cardiovascular     Neuro/Psych    (+)  neuromuscular disease, anxiety/panic attacks,  depression      Comments: Chronic right-sided low back pain with right-sided sciatica  Chronic migraine w/o aura w/o status migrainosus, not intractable  Cervical neuritis      GI/Hepatic/Renal    (+) hiatal hernia, GERD    Endo/Other    Abdominal                  Anesthesia Plan:   ASA:  2  Plan:   General  Airway:  ETT  Informed Consent Plan and Risks Discussed With:  Patient      I have informed Debbie Peterson and/or legal guardian or family member of the nature of the anesthetic plan, benefits, risks including possible dental damage if relevant, major complications, and any alternative forms of anesthetic management.   All of the patient's questions were answered to the best of my ability. The patient desires the anesthetic  management as planned.  Dane Brian MD  11/7/2024 2:04 PM  Present on Admission:  **None**           [1]   Medications Prior to Admission   Medication Sig Dispense Refill Last Dose/Taking    Folic Acid (FOLATE OR) Take by mouth.   11/7/2024 at  6:00 AM    Estradiol-Norethindrone Acet (COMBIPATCH) 0.05-0.14 MG/DAY Transdermal Patch Biweekly Place 1 patch onto the skin twice a week. 12 patch 3 11/5/2024    COLESEVELAM 625 MG Oral Tab TAKE 1 TABLET(625 MG) BY MOUTH TWICE DAILY WITH MEALS 180 tablet 0 11/7/2024 at  7:00 AM    Galcanezumab-gnlm (EMGALITY) 120 MG/ML Subcutaneous Solution Auto-injector Inject 120 mg into the skin every 30 (thirty) days. 1 each 0 Past Month    Rimegepant Sulfate (NURTEC) 75 MG Oral Tablet Dispersible Take 75 mg by mouth as needed. Take one tablet at onset of migraine.  Maximum dose in 24 hours is 1 tablet (75mg). 8 tablet 11 11/5/2024    omeprazole 20 MG Oral Capsule Delayed Release Take 1 capsule (20 mg total) by mouth every morning. (Patient taking differently: Take 1 capsule (20 mg total) by mouth nightly as needed.) 30 capsule 2 Past Month    SUMAtriptan Succinate (IMITREX) 100 MG Oral Tab Take 1 tablet (100 mg total) by mouth every 2 (two) hours as needed for Migraine. Use at onset; repeat once after 2 HRS-ONLY 2 doses of any sumatriptan (nasal, oral, or injectable) IN 24 HR MAX. 27 tablet 3 11/5/2024    propranolol 20 MG Oral Tab 2 pill  tablet 3 11/7/2024 at  5:00 AM    LORazepam 1 MG Oral Tab Take 1 tablet (1 mg total) by mouth 3 (three) times daily. (Patient taking differently: Take 1 tablet (1 mg total) by mouth nightly.) 90 tablet 3 11/6/2024 at 11:00 PM    miSOPROStol 100 MCG Oral Tab Take 2 tablets per vagina the night prior to the procedure. 2 tablet 0 11/6/2024 at 11:00 PM    Fluocinonide 0.05 % External Solution Apply 1 Application  topically daily. 60 mL 0 Taking    azelastine 0.1 % Nasal Solution 2 sprays by Nasal route 2 (two) times daily. 90 mL 1 11/7/2024 at   5:00 AM    betamethasone dipropionate 0.05 % External Lotion Apply 2 mL topically daily. To scalp 180 mL 0 Taking    famotidine 20 MG Oral Tab Take 1 tablet (20 mg total) by mouth nightly. (Patient taking differently: Take 1 tablet (20 mg total) by mouth daily.) 90 tablet 1 11/7/2024 at  5:00 AM    ipratropium 0.06 % Nasal Solution 2 sprays by Nasal route 4 (four) times daily. 42 mL 3 11/7/2024 at  5:00 AM    levothyroxine 150 MCG Oral Tab Take 1 tablet (150 mcg total) by mouth before breakfast. 90 tablet 3 11/7/2024 at  5:00 AM    Ivermectin 1 % External Cream APPLY THIN LAYER TOPICALLY TO FACE EVERY DAY   Taking    methotrexate 2.5 MG Oral Tab Take 3 tablets (7.5 mg total) by mouth once a week.   11/6/2024 at 11:00 PM    valACYclovir 500 MG Oral Tab Take 1 tablet (500 mg total) by mouth daily.   11/6/2024 at  7:00 AM    mometasone furoate 50 MCG/ACT Nasal Suspension 1 spray by Nasal route daily. 51 g 2 11/7/2024 at  5:00 AM    Azelaic Acid (FINACEA) 15 % External Gel Apply topically daily. 1 each 0 Taking    cholecalciferol 50 MCG (2000 UT) Oral Cap Take 1 capsule (2,000 Units total) by mouth daily. Take 2 tablets daily 90 capsule 0 Past Week    loperamide 2 MG Oral Cap Take 1 capsule (2 mg total) by mouth 4 (four) times daily as needed for Diarrhea. 90 capsule 0 More than a month    Naltrexone Does not apply Powder compound 1 mg capsules to be taken orally daily. 30 each 3 Unknown    ondansetron (ZOFRAN) 4 mg tablet Take 1 tablet (4 mg total) by mouth every 8 (eight) hours as needed for Nausea. 30 tablet 3 More than a month    fluticasone-umeclidin-vilant (TRELEGY ELLIPTA) 100-62.5-25 MCG/ACT Inhalation Aerosol Powder, Breath Activated Inhale 1 puff into the lungs daily.   More than a month    Ibuprofen (MOTRIN OR) Take 200 mg by mouth.   10/31/2024    Syringe, Disposable, (EASY GLIDE LUER LOCK SYRINGE) 1 ML Does not apply Misc Use with sumatriptan daily prn. 25 each 0     Needle, Disp, 26G X 1/2\" Does not apply  Misc Use with Imitrex injection. 25 each 0     Syringe, Disposable, 1 ML Does not apply Misc Use with sumatriptan injectable solution 2 each 1    [2]   Current Facility-Administered Medications Ordered in Epic   Medication Dose Route Frequency Provider Last Rate Last Admin    lactated ringers infusion   Intravenous Continuous Rosmery Morfin MD 20 mL/hr at 11/07/24 1313 New Bag at 11/07/24 1313    metoprolol tartrate (Lopressor) tab 25 mg  25 mg Oral Once PRN Rosmery Morfin MD         No current [facility]-ordered outpatient medications on file.   [3]   Allergies  Allergen Reactions    Cyclobenzaprine OTHER (SEE COMMENTS)     Drowsy, nightmares    Gabapentin OTHER (SEE COMMENTS)     Upper and lower extremity ataxia and cognitive impairment     Hydroxychloroquine OTHER (SEE COMMENTS)     eye toxicity    Meloxicam PALPITATIONS, SHORTNESS OF BREATH and TINITUS    Ropinirole OTHER (SEE COMMENTS)     Vertigo     Naproxen OTHER (SEE COMMENTS)    Sulfa Antibiotics HIVES    Trimethoprim HIVES    Biaxin [Clarithromycin] ITCHING    Sulfamethoxazole W/Trimethoprim ITCHING

## 2024-11-07 NOTE — ANESTHESIA POSTPROCEDURE EVALUATION
Patient: Debbie Peterson    Procedure Summary       Date: 11/07/24 Room / Location: [facility] MAIN OR  / [facility] MAIN OR    Anesthesia Start: 1534 Anesthesia Stop: 1619    Procedure: Hysteroscopy with endometrial polypectomy Diagnosis:       PMB (postmenopausal bleeding)      Thickened endometrium      (PMB (postmenopausal bleeding) [N95.0]Thickened endometrium [R93.89])    Surgeons: Rosmery Morfin MD Anesthesiologist: Dane Brian MD    Anesthesia Type: general ASA Status: 2            Anesthesia Type: general    Vitals Value Taken Time   /78 11/07/24 1618   Temp 97.6 11/07/24 1619   Pulse 81 11/07/24 1619   Resp 22 11/07/24 1619   SpO2 100 % 11/07/24 1619   Vitals shown include unfiled device data.    [facility] AN Post Evaluation:   Patient Evaluated in PACU  Patient Participation: complete - patient participated  Level of Consciousness: awake  Pain Score: 0  Pain Management: adequateYes    Nausea/Vomiting: none  Cardiovascular Status: acceptable  Respiratory Status: acceptable  Postoperative Hydration acceptable      Dane Brian MD  11/7/2024 4:19 PM

## 2024-11-08 NOTE — OPERATIVE REPORT
[facility]    PATIENT'S NAME: ARLET BLANCO   ATTENDING PHYSICIAN: Rosmery Morfin MD   OPERATING PHYSICIAN: Rosmery Morfin MD   PATIENT ACCOUNT#:   [id number]    LOCATION:  [facility] PACU [address] [address]  MEDICAL RECORD #:   [id number]       YOB: 1955  ADMISSION DATE:       11/07/2024      OPERATION DATE:  11/07/2024    OPERATIVE REPORT      PREOPERATIVE DIAGNOSIS:  Postmenopausal bleeding.  POSTOPERATIVE DIAGNOSIS:  Postmenopausal bleeding.  PROCEDURE:  Hysteroscopy with endometrial sampling and endometrial polypectomy.      COMPLICATIONS:  None.    SPECIMENS:  Endometrial curettings with endometrial polyps to Pathology.    FINDINGS:  Per hysteroscopy, multiple endometrial polyps removed in their entirety.  Bilateral tubal ostia were visualized.      OPERATIVE TECHNIQUE:  Patient was taken to the operating room, placed supine on the table.  General anesthesia was induced.  The patient was placed in the dorsal lithotomy position.  The perineum and vagina were prepped and draped in the usual sterile fashion.  A time-out was performed.  The cervix was visualized with a single-sided speculum.  The cervix was grasped with a single-tooth tenaculum.  Her introitus and vagina were noted to be extremely small.  The cervix was dilated and a TruClear hysteroscope with 0.9 normal saline was placed through the cervix into the uterus.  The findings were as above.  The soft tissue mini scraper was placed through the operative port, and the polyps removed in their entirety as well as the endometrial was sampled.  All instruments were removed from the vagina.  Hemostasis was noted to be adequate.  The patient was placed supine on the table, extubated, and taken to the recovery room in stable condition.    Dictated By Rosmery Morfin MD  d: 11/07/2024 16:40:47  t: 11/08/2024 03:00:48  [facility] [id number]  /

## 2024-11-10 ENCOUNTER — HOSPITAL ENCOUNTER (OUTPATIENT)
Age: 69
Discharge: HOME | End: 2024-11-10
Payer: MEDICARE

## 2024-11-10 ENCOUNTER — APPOINTMENT (OUTPATIENT)
Dept: ULTRASOUND IMAGING | Facility: HOSPITAL | Age: 69
End: 2024-11-10
Attending: NURSE PRACTITIONER
Payer: MEDICARE

## 2024-11-10 VITALS
TEMPERATURE: 98 F | OXYGEN SATURATION: 100 % | SYSTOLIC BLOOD PRESSURE: 121 MMHG | RESPIRATION RATE: 20 BRPM | DIASTOLIC BLOOD PRESSURE: 65 MMHG | HEART RATE: 78 BPM

## 2024-11-10 DIAGNOSIS — L03.113 CELLULITIS OF RIGHT UPPER EXTREMITY: ICD-10-CM

## 2024-11-10 DIAGNOSIS — I80.8 THROMBOPHLEBITIS OF RIGHT UPPER EXTREMITY: ICD-10-CM

## 2024-11-10 DIAGNOSIS — R22.31 LOCALIZED SWELLING OF RIGHT UPPER EXTREMITY: ICD-10-CM

## 2024-11-10 DIAGNOSIS — I82.611 SUPERFICIAL VENOUS THROMBOSIS OF RIGHT UPPER EXTREMITY: Primary | ICD-10-CM

## 2024-11-10 PROCEDURE — 93971 EXTREMITY STUDY: CPT | Performed by: NURSE PRACTITIONER

## 2024-11-10 PROCEDURE — 99214 OFFICE O/P EST MOD 30 MIN: CPT | Performed by: NURSE PRACTITIONER

## 2024-11-10 RX ORDER — CEFADROXIL 500 MG/1
500 CAPSULE ORAL 2 TIMES DAILY
Qty: 16 CAPSULE | Refills: 0 | Status: SHIPPED | OUTPATIENT
Start: 2024-11-10 | End: 2024-11-18

## 2024-11-10 NOTE — ED PROVIDER NOTES
Patient Seen in: [facility]      History   No chief complaint on file.    Stated Complaint: Arm Infection    Subjective:   Well-appearing 68-year-old female with acquired hypothyroidism, fatigue, lupus, Sjogren's disease, gastroesophageal reflux disease, vitamin D deficiency cervical neuritis, anxiety and osteoporosis presents with complaints of redness, warmth and swelling to her right lower arm over the past 3 days.  Patient communicates that 4 days ago she had a surgical procedure done at [facility] where her IV line infiltrated.  Patient communicates that today she woke up with increased erythema and swelling.  Patient does communicate some tenderness to right arm.  Patient denies fever or chills.                Objective:     Past Medical History:    Arthritis    Autoimmune disorder (HCC)    Lupus    Cervical disc displacement    C4-C5, C5-C6 disc displacement    Chronic pain    Right foot after  foot surgery    Depression    Daughter  (adopted)    Difficulty urinating    Hearing impaired person, bilateral    Hiatal hernia with gastroesophageal reflux    Hypothyroidism    Infertility    Interstitial cystitis    Long COVID    Lupus    Migraine    Migraines    Peripheral neuropathy    PONV (postoperative nausea and vomiting)    Reactive depression (situational)    when daughter     Scalp psoriasis    SI (sacroiliac) joint dysfunction    Sjogren's disease (HCC)    Traumatic brain injury (HCC)    Tremor    Trigeminal neuralgia pain    Vaginal stricture    Vasculitis (HCC)    nose and sinus    Vertigo              Past Surgical History:   Procedure Laterality Date    Arthroscopy, shoulder, surgical; w/rotator cuff repair Right     subclavian reconstruction      Biopsy of skin lesion  2020    Cataract extraction Bilateral     Colonoscopy  2012    Correct bunion,simple Right     Egd  2012    Foot fracture surgery Right     Hysteroscopy,diagnostic  2024     Other surgical history  2005    heel: sural nerve neuroma excision -- achilles tendon accidentally injured during surgery                Social History     Socioeconomic History    Marital status: Single    Number of children: 0   Occupational History    Occupation: psychiatrist     Comment: retired   Tobacco Use    Smoking status: Never    Smokeless tobacco: Never   Vaping Use    Vaping status: Never Used   Substance and Sexual Activity    Alcohol use: Not Currently    Drug use: Never    Sexual activity: Not Currently   Other Topics Concern    Blood Transfusions No    Caffeine Concern Yes     Comment: tea, 1 cup    Exercise No   Social History Narrative    Lives alone    Feels safe    No hx of abuse     Social Drivers of Health     Financial Resource Strain: Not At Risk (7/6/2022)    Received [facility], [facility]    Financial Resource Strain     How hard is it for you to pay for the very basics like food, housing, medical care, and heating?: Not hard at all   Food Insecurity: No Food Insecurity (7/6/2022)    Received from [facility], [facility]    Food Insecurity     Currently or in the past 3 months, have you worried your food would run out before you had money to buy more?: No     In the past 12 months, have you run out of food or been unable to get more?: No   Transportation Needs: Unmet Transportation Needs (7/6/2022)    Received from [facility], [facility]    Transportation Needs     Medical Transportation Needs?: Yes     Daily Living Transportation Needs? [Peds Only] : Yes   Social Connections: Not At Risk (7/6/2022)    Received from [facility], [facility]    Social Connections     In a typical week, how many times do you talk on the phone with family, friends, or neighbors?: Once a week    Received from [facility], [facility] St[facility]              Review of Systems    Positive for stated complaint: Arm Infection  Other systems are as noted in HPI.  Constitutional and vital signs reviewed.      All other systems reviewed and negative except as noted above.    Physical Exam     ED Triage Vitals [11/10/24 0844]   /65   Pulse 78   Resp 20   Temp 97.5 °F (36.4 °C)   Temp src Temporal   SpO2 100 %   O2 Device None (Room air)       Current Vitals:   Vital Signs  BP: 121/65  Pulse: 78  Resp: 20  Temp: 97.5 °F (36.4 °C)  Temp src: Temporal    Oxygen Therapy  SpO2: 100 %  O2 Device: None (Room air)        Physical Exam  VS: Vital signs reviewed. 02 saturation within normal limits for this patient.    General: Patient is awake and alert, oriented to person, place and time. Pt appears non-toxic.     HEENT: Head is normocephalic, atraumatic.  Nonicteric sclera, no conjunctival injection. No facial droop or slurred speech. No oral lesions or pallor. Mucous membranes moist.     Neck: Supple. Normal ROM.    Lungs: Good inspiratory effort. No accessory muscle use or tachypnea.    Extremities: Capillary refill noted. The patient's motor strength is      Right upper arm: Swelling, erythema and warmth present. No edema, deformity, lacerations or bony tenderness.      Right elbow: Normal.      Right forearm: Swelling, erythema, warmth and tenderness present. No deformity.      Right wrist: Swelling, erythema, warmth and tenderness present. No deformity or lacerations. Normal range of motion. Normal pulse.      Right hand: No swelling, deformity, tenderness or bony tenderness. Normal range of motion. Normal strength. Normal sensation. Normal capillary refill. Normal pulse.     Skin: Warm, dry and normal in color.     Psychiatric: Normal affect, judgement normal, insight normal.     CNS: Moves all 4 extremities. Interacts appropriately. No gait abnormality. Memory normal.        ED Course   Labs Reviewed - No data to  display  PROCEDURE: US VENOUS DOPPLER ARM RIGHT-DIAG IMG (CPT=93971)     COMPARISON: None.     INDICATIONS: Arm Infection     TECHNIQUE: Color duplex Doppler venous ultrasound of the right upper extremity was performed in the usual manner.     FINDINGS:     There is occlusive thrombus in the right basilic vein extending from the mid to the distal upper arm and throughout the forearm.     There is nonocclusive thrombus in the right cephalic vein at the mid upper arm.  There is occlusive thrombus in the right cephalic vein throughout the forearm.     The internal jugular, subclavian, axillary, and brachial veins appear normal.  Flow was demonstrated with color and pulsed Doppler.  Comparison scans of the left subclavian vein appear normal.        Impression  CONCLUSION:     1. No evidence of acute deep venous thrombosis in the imaged veins of the right upper extremity.     2. Superficial venous thrombus in the right basilic and cephalic veins as described in the body of this report.        Dictated by (CST): Lincoln Mcmahon MD on 11/10/2024 at 10:43 AM      Finalized by (CST): Lincoln Mcmahon MD on 11/10/2024 at 10:46 AM    [facility]   Medical Decision Making  Well appearing.   US venous doppler of the right upper arm shows 1. No evidence of acute deep venous thrombosis in the imaged veins of the right upper extremity. 2. Superficial venous thrombus in the right basilic and cephalic veins as described in the body of this report.      I consulted with Dr. Hogan, hematologist, regarding US results and plan of care.  Dr. Hogan stated no need to start patient on anticoagulants for superficial venous thrombosis.  She discussed warm compresses as well as NSAIDs.  Patient is unable to take naproxen, but is able to take ibuprofen.  Prescription for cefadroxil was sent to pharmacy on file for treatment of right upper extremity cellulitis.    CMP from February 21, 2024 reviewed, normal renal function.    Close PMD follow-up as well as  return precautions discussed.    Differential diagnosis discussed included cellulitis versus DVT          Problems Addressed:  Cellulitis of right upper extremity: acute illness or injury  Localized swelling of right upper extremity: acute illness or injury  Superficial venous thrombosis of right upper extremity: acute illness or injury  Thrombophlebitis of right upper extremity: acute illness or injury    Amount and/or Complexity of Data Reviewed  Radiology: ordered and independent interpretation performed. Decision-making details documented in ED Course.    Risk  OTC drugs.  Prescription drug management.        Disposition and Plan     Clinical Impression:  1. Superficial venous thrombosis of right upper extremity    2. Localized swelling of right upper extremity    3. Cellulitis of right upper extremity    4. Thrombophlebitis of right upper extremity         Disposition:  Discharge  11/10/2024 12:02 pm    Follow-up:  Andrea Candelario MD  [address] [place] [address]  [address] [place] [id number]  [phone]    In 1 week            Medications Prescribed:  Discharge Medication List as of 11/10/2024 12:27 PM        START taking these medications    Details   cefadroxil 500 MG Oral Cap Take 1 capsule (500 mg total) by mouth 2 (two) times daily for 8 days., Normal, Disp-16 capsule, R-0                 Supplementary Documentation:

## 2024-11-10 NOTE — ED INITIAL ASSESSMENT (HPI)
Pt here with redness and swelling to her right arm , pt states she had surgery 4 days ago and her IV infiltrated, pt noticed redness right after , noticed streaking 1 day ago and today woke up with redness and swelling  to right arm , pt denies any fevers or chills

## 2024-11-11 ENCOUNTER — TELEPHONE (OUTPATIENT)
Facility: CLINIC | Age: 69
End: 2024-11-11

## 2024-11-11 DIAGNOSIS — R19.7 DIARRHEA, UNSPECIFIED TYPE: Primary | ICD-10-CM

## 2024-11-11 NOTE — TELEPHONE ENCOUNTER
Current Outpatient Medications   Medication Sig Dispense Refill    COLESEVELAM 625 MG Oral Tab TAKE 1 TABLET(625 MG) BY MOUTH TWICE DAILY WITH MEALS 180 tablet 0         Med not covered. Call plan to initiate PA.

## 2024-11-12 ENCOUNTER — PATIENT MESSAGE (OUTPATIENT)
Dept: OBGYN CLINIC | Facility: CLINIC | Age: 69
End: 2024-11-12

## 2024-11-13 NOTE — TELEPHONE ENCOUNTER
Will forward to Juliet Sy who has seen this pt recently - could she try cholestyramine again ? Other options.

## 2024-11-13 NOTE — TELEPHONE ENCOUNTER
Dr. Prince    Denial received for Colesevelam    It says it must be prescribed for an FDA approved diagnosis-IBS D/Diarrhea/use as bile acid sequestrant are not approved diagnoses    If it is being used for the FDA approved indication patient would need to have tried and failed preferred drugs like: metformin, glipizide, pioglitazone, januvia, farxiga, jardiance, synjardy, xigduo    There is an option to appeal if you wanted to write a letter-can fax appeal letter to [phone]    Please let me know how you would like to proceed .    Thank you

## 2024-11-14 NOTE — TELEPHONE ENCOUNTER
Appeal letter written, placed on RN desk to fax.     Please remind patient that C Diff test is ordered and needs to be completed when able.     HAMIDA Davis

## 2024-11-14 NOTE — TELEPHONE ENCOUNTER
Patient contacted.  I let her know about denial and that we are trying to appeal.  Reminded her to do C Diff testing as ordered.  I faxed in appeal : Clinical Review Department Fax # [phone]

## 2024-11-15 NOTE — ED QUICK NOTES
Received call from patient today requesting a longer course of antibiotics.  Patient communicates that right arm swelling and erythema have improved although she is concerned that since it is not completely resolved she might need a longer course.  No PMD follow-up has been made.  I discussed with patient that it is important to get reevaluated by her PMD to have oral antibiotic course extended versus evaluation for IV antibiotics.

## 2024-11-29 ENCOUNTER — PATIENT MESSAGE (OUTPATIENT)
Facility: CLINIC | Age: 69
End: 2024-11-29

## 2024-11-29 ENCOUNTER — TELEPHONE (OUTPATIENT)
Facility: CLINIC | Age: 69
End: 2024-11-29

## 2024-11-29 DIAGNOSIS — E03.9 ACQUIRED HYPOTHYROIDISM: Primary | ICD-10-CM

## 2024-11-29 NOTE — TELEPHONE ENCOUNTER
Filiberto Chung    Please see  [employer] message for patient's second question.     I answered the initial question.    Thank you

## 2024-11-29 NOTE — TELEPHONE ENCOUNTER
1st reminder letter sent out via My Chart for the following:    C. diff toxigenic PCR (OPT) (Order #[id number]) on 9/12/24

## 2024-12-10 ENCOUNTER — MED REC SCAN ONLY (OUTPATIENT)
Dept: PHYSICAL MEDICINE AND REHAB | Facility: CLINIC | Age: 69
End: 2024-12-10

## 2024-12-17 ENCOUNTER — LAB ENCOUNTER (OUTPATIENT)
Dept: LAB | Facility: REFERENCE LAB | Age: 69
End: 2024-12-17
Payer: MEDICARE

## 2024-12-17 ENCOUNTER — OFFICE VISIT (OUTPATIENT)
Dept: OBGYN CLINIC | Facility: CLINIC | Age: 69
End: 2024-12-17
Payer: MEDICARE

## 2024-12-17 VITALS
SYSTOLIC BLOOD PRESSURE: 116 MMHG | DIASTOLIC BLOOD PRESSURE: 70 MMHG | HEIGHT: 64 IN | WEIGHT: 152.38 LBS | BODY MASS INDEX: 26.02 KG/M2

## 2024-12-17 DIAGNOSIS — R19.7 DIARRHEA, UNSPECIFIED TYPE: ICD-10-CM

## 2024-12-17 DIAGNOSIS — Z09 POSTOP CHECK: Primary | ICD-10-CM

## 2024-12-17 DIAGNOSIS — E03.9 ACQUIRED HYPOTHYROIDISM: ICD-10-CM

## 2024-12-17 LAB — TSI SER-ACNC: <0.01 UIU/ML (ref 0.55–4.78)

## 2024-12-17 PROCEDURE — 99024 POSTOP FOLLOW-UP VISIT: CPT | Performed by: OBSTETRICS & GYNECOLOGY

## 2024-12-17 PROCEDURE — 84443 ASSAY THYROID STIM HORMONE: CPT

## 2024-12-17 PROCEDURE — 36415 COLL VENOUS BLD VENIPUNCTURE: CPT

## 2024-12-17 NOTE — PROGRESS NOTES
CC: Patient is here for postop visit after 2024 surgery    HPI: Patient is a 69 year old  for FU after 2024 hysteroscopy with endometrial polypectomy. No further bleeding.       No LMP recorded. Patient is postmenopausal.    OB History    Para Term  AB Living   3       3     SAB IAB Ectopic Multiple Live Births   2   1          # Outcome Date GA Lbr Alejandro/2nd Weight Sex Type Anes PTL Lv   3 Ectopic               Birth Comments: occurred while in [place] -- no surgery No blood transfusion   2 SAB            1 SAB               Obstetric Comments   Pt cannot recall how many sab had; had IVF       GYN hx:       LPS:      Past Medical History:    Arthritis    Autoimmune disorder (HCC)    Lupus    Cervical disc displacement    C4-C5, C5-C6 disc displacement    Chronic pain    Right foot after  foot surgery    Depression    Daughter  (adopted)    Difficulty urinating    Hearing impaired person, bilateral    Hiatal hernia with gastroesophageal reflux    Hypothyroidism    Infertility    Interstitial cystitis    Long COVID    Lupus    Migraine    Migraines    Peripheral neuropathy    PONV (postoperative nausea and vomiting)    Reactive depression (situational)    when daughter     Scalp psoriasis    SI (sacroiliac) joint dysfunction    Sjogren's disease (HCC)    Traumatic brain injury (HCC)    Tremor    Trigeminal neuralgia pain    Vaginal stricture    Vasculitis (HCC)    nose and sinus    Vertigo     Past Surgical History:   Procedure Laterality Date    Arthroscopy, shoulder, surgical; w/rotator cuff repair Right     subclavian reconstruction      Biopsy of skin lesion  2020    Cataract extraction Bilateral     Colonoscopy  2012    Correct bunion,simple Right     Egd  2012    Foot fracture surgery Right     Hysteroscopy,diagnostic  2024    endometrial polypectomy    Other surgical history      heel: sural nerve neuroma excision -- achilles tendon accidentally  injured during surgery     Allergies[1]  Family History   Problem Relation Age of Onset    Hypertension Mother     Obesity Mother     Depression Mother     Dementia Father         dementia induced by MRSA sepsis (cause of death)    Infectious Disease Father         MRSA sepsis (cause of death)    Fibromyalgia Sister     Alcohol and Other Disorders Associated Brother         alcoholism    Depression Brother     Other (drug use) Brother     Breast Cancer Maternal Aunt 65    Depression Other         family h/o    Ovarian Cancer Neg     Colon Cancer Neg      Social History     Socioeconomic History    Marital status: Single     Spouse name: Not on file    Number of children: 0    Years of education: Not on file    Highest education level: Not on file   Occupational History    Occupation: psychiatrist     Comment: retired   Tobacco Use    Smoking status: Never    Smokeless tobacco: Never   Vaping Use    Vaping status: Never Used   Substance and Sexual Activity    Alcohol use: Not Currently    Drug use: Never    Sexual activity: Not Currently   Other Topics Concern     Service Not Asked    Blood Transfusions No    Caffeine Concern Yes     Comment: tea, 1 cup    Occupational Exposure Not Asked    Hobby Hazards Not Asked    Sleep Concern Not Asked    Stress Concern Not Asked    Weight Concern Not Asked    Special Diet Not Asked    Back Care Not Asked    Exercise No    Bike Helmet Not Asked    Seat Belt Not Asked    Self-Exams Not Asked   Social History Narrative    Lives alone    Feels safe    No hx of abuse     Social Drivers of Health     Financial Resource Strain: Not At Risk (7/6/2022)    Received from [facility], [facility]    Financial Resource Strain     How hard is it for you to pay for the very basics like food, housing, medical care, and heating?: Not hard at all   Food Insecurity: No Food Insecurity (7/6/2022)    Received from [facility], [facility]    Food Insecurity     Currently or in the past 3 months, have you worried your food would run out before you had money to buy more?: No     In the past 12 months, have you run out of food or been unable to get more?: No   Transportation Needs: Unmet Transportation Needs (7/6/2022)    Received from [facility], [facility]    Transportation Needs     Currently or in the past 3 months, has lack of transportation kept you from medical appointments, getting food or medicine, or providing care to a family member?: Not on file     Has the lack of transportation kept you from meetings, work, or from getting things needed for daily living?: Not on file     Medical Transportation Needs?: Yes     Daily Living Transportation Needs? [Peds Only] : Yes   Physical Activity: Not on file   Stress: Not on file   Social Connections: Not At Risk (7/6/2022)    Received from [facility], [facility]    Social Connections     In a typical week, how many times do you talk on the phone with family, friends, or neighbors?: Once a week   Housing Stability: Low Risk  (7/15/2023)    Received from [facility], [facility]    Housing Stability     Mortgage Payment Concerns?: Not on file     Number of Places Lived in the Last Year: Not on file     Unstable Housing?: Not on file       Medications reviewed. See active list.     There were no vitals taken for this visit.      Exam:   GENERAL: well developed, well nourished, in no apparent distress  ABDOMEN: Soft, non distended; non tender, no masses.  Liver and spleen non-tender, no enlargement. No palpable hernias  GYNE/:  External Genitalia: Normal appearing, no lesions, normal hair distribution   Urethral meatus appear wnl, no abnormal discharge or lesions noted.   Bladder: well supported, urethra wnl, no palpable tenderness or masses, no discharge  Vagina:  normal pink mucosa, no lesions, normal clear discharge.   Uterus: midline, mobile, non-tender, firm and smooth  Cervix: pink, no lesions grossly visible, no discharge  Adnexa: non tender, no palpable masses, normal size  Anus:  No lesions or visible hemorrhoids        A/P: Patient is 69 year old female     1. Postop check    DW her operative findings.     Rosmery Morfin MD                [1]   Allergies  Allergen Reactions    Cyclobenzaprine OTHER (SEE COMMENTS)     Drowsy, nightmares    Gabapentin OTHER (SEE COMMENTS)     Upper and lower extremity ataxia and cognitive impairment     Hydroxychloroquine OTHER (SEE COMMENTS)     eye toxicity    Meloxicam PALPITATIONS, SHORTNESS OF BREATH and TINITUS    Ropinirole OTHER (SEE COMMENTS)     Vertigo     Naproxen OTHER (SEE COMMENTS)    Sulfa Antibiotics HIVES    Trimethoprim HIVES    Biaxin [Clarithromycin] ITCHING    Sulfamethoxazole W/Trimethoprim ITCHING

## 2024-12-18 ENCOUNTER — PATIENT MESSAGE (OUTPATIENT)
Facility: CLINIC | Age: 69
End: 2024-12-18

## 2024-12-18 NOTE — TELEPHONE ENCOUNTER
Juliet  Can you take over prescribing the colesevelam - I have no seen the pt in a couple of years.   Thanks   Darcie

## 2024-12-18 NOTE — TELEPHONE ENCOUNTER
Requested Prescriptions     Pending Prescriptions Disp Refills    COLESEVELAM 625 MG Oral Tab [Pharmacy Med Name: COLESEVELAM 625MG TABLETS] 180 tablet 0     Sig: TAKE 1 TABLET(625 MG) BY MOUTH TWICE DAILY WITH MEALS       LOV  10/03/2024  LR   10/04/2024    em

## 2024-12-19 RX ORDER — COLESEVELAM 180 1/1
625 TABLET ORAL 2 TIMES DAILY WITH MEALS
Qty: 180 TABLET | Refills: 0 | Status: SHIPPED | OUTPATIENT
Start: 2024-12-19

## 2024-12-20 NOTE — TELEPHONE ENCOUNTER
I called [phone] to follow up on the appeal.  I spoke to representative Millicent  She put me on hold several times trying to get a hold of clinical review team  After being on the phone for over 18 minutes I was told that they never got our request even though I have a successful fax confirmation.  She told me that we can submit redetermination request verbally over the phone now.  I was told this will take 7 days for review.  I was told we will get a fax about where to send the information.  Call reference number:  [id number].    I asked what the point of this verbal redetermination is because I already faxed in the paperwork to the fax they told me to fax it to with a confirmation.  Why can't I just send this in again as now we are starting back at square one.  This is our only option, she said maybe they won't even need any clinical documentation.    29 minutes spent on this call with no progress in getting this patient this medication.    Await fax.

## 2024-12-23 NOTE — TELEPHONE ENCOUNTER
Fax received from [employer] that they need additional information by 12/27/2024 at 7699    Case number [id number]    It is requesting that we document that patient tried and failed all formulary alternatives first.  It also came with formulary exception form that needs to be completed.    Form completed and faxed to [employer] Appeals Department  Fax [phone]  Phone [phone]   Hydroxyzine Counseling: Patient advised that the medication is sedating and not to drive a car after taking this medication.  Patient informed of potential adverse effects including but not limited to dry mouth, urinary retention, and blurry vision.  The patient verbalized understanding of the proper use and possible adverse effects of hydroxyzine.  All of the patient's questions and concerns were addressed.

## 2024-12-27 RX ORDER — COLESTIPOL HYDROCHLORIDE 5 G/5G
5 GRANULE, FOR SUSPENSION ORAL DAILY
Qty: 150 G | Refills: 2 | Status: SHIPPED | OUTPATIENT
Start: 2024-12-27 | End: 2025-03-27

## 2024-12-27 NOTE — TELEPHONE ENCOUNTER
Filiberto Chung    Prior authorization for Colesevelam was denied.    Denial letter placed on your desk.    Thank you

## 2024-12-27 NOTE — TELEPHONE ENCOUNTER
Spoke with Debbie, because colesevelam was denied, we have mutually decided to try colestipol packet/granules. She has some leftover colesevelam which she is taking once to twice a day and this is helping a lot with stool frequency and consistency. She will try the colestipol and will schedule a follow up visit.

## 2025-01-02 ENCOUNTER — TELEPHONE (OUTPATIENT)
Facility: CLINIC | Age: 70
End: 2025-01-02

## 2025-01-02 ENCOUNTER — TELEPHONE (OUTPATIENT)
Dept: OBGYN CLINIC | Facility: CLINIC | Age: 70
End: 2025-01-02

## 2025-01-02 DIAGNOSIS — Z78.0 MENOPAUSE: Primary | ICD-10-CM

## 2025-01-02 RX ORDER — ESTRADIOL/NORETHINDRONE ACETATE TRANSDERMAL SYSTEM .05; .14 MG/D; MG/D
PATCH, EXTENDED RELEASE TRANSDERMAL
Qty: 8 PATCH | Refills: 11 | Status: SHIPPED | OUTPATIENT
Start: 2025-01-02

## 2025-01-02 NOTE — TELEPHONE ENCOUNTER
Current Outpatient Medications   Medication Sig Dispense Refill    Colestipol HCl 5 g Oral Granules Take 5 g by mouth daily. 150 g 2     Please change rx. Can't be ordered through our vendor

## 2025-01-02 NOTE — TELEPHONE ENCOUNTER
RN spoke with pt. Pt started CombiPatch in Oct 2024 because she could no longer take oral HRT d/t absorption issues r/t another medication. Pt would like to increase the dosage or change her patch more often as she is waking up with migraines (pt has h/o migraines) and gets \"drenching hot flashes\". RN told pt RN would speak to Dr. Nguyen and would call her back. Pt verbalized understanding and agreed with plan of care.

## 2025-01-02 NOTE — TELEPHONE ENCOUNTER
Incoming call from patient who would like to speak with a RN regarding her hormone patches. Patient states that she would like to change the patch more often to avoid hot flashes and migraines.     Please assist

## 2025-01-02 NOTE — TELEPHONE ENCOUNTER
RN spoke with pt and told her Dr. Nguyen said she can change her patch every 3 days. RN sent new prescription to reflect change. Pt verbalized understanding and agreed with plan of care.

## 2025-01-02 NOTE — TELEPHONE ENCOUNTER
Per pharmacist, Colestipol cannot be ordered from [email or web address]'s vendor.  Pharmacist will reach out to patient and let her know to find a different pharmacy who carries this medication.  No further questions.    Left detailed message on patient's voicemail

## 2025-02-20 ENCOUNTER — LAB ENCOUNTER (OUTPATIENT)
Dept: LAB | Age: 70
End: 2025-02-20
Attending: FAMILY MEDICINE
Payer: MEDICARE

## 2025-02-20 ENCOUNTER — OFFICE VISIT (OUTPATIENT)
Facility: CLINIC | Age: 70
End: 2025-02-20
Payer: MEDICARE

## 2025-02-20 VITALS
DIASTOLIC BLOOD PRESSURE: 88 MMHG | HEART RATE: 110 BPM | SYSTOLIC BLOOD PRESSURE: 136 MMHG | OXYGEN SATURATION: 98 % | BODY MASS INDEX: 26.29 KG/M2 | WEIGHT: 154 LBS | HEIGHT: 64 IN

## 2025-02-20 DIAGNOSIS — M54.41 CHRONIC RIGHT-SIDED LOW BACK PAIN WITH RIGHT-SIDED SCIATICA: ICD-10-CM

## 2025-02-20 DIAGNOSIS — M25.50 POLYARTHRALGIA: Primary | ICD-10-CM

## 2025-02-20 DIAGNOSIS — M51.9 LUMBAR DISC DISEASE: ICD-10-CM

## 2025-02-20 DIAGNOSIS — F13.99 SEDATIVE, HYPNOTIC OR ANXIOLYTIC USE, UNSPECIFIED WITH UNSPECIFIED SEDATIVE, HYPNOTIC OR ANXIOLYTIC-INDUCED DISORDER (HCC): ICD-10-CM

## 2025-02-20 DIAGNOSIS — M25.50 POLYARTHRALGIA: ICD-10-CM

## 2025-02-20 DIAGNOSIS — M54.16 LUMBAR RADICULOPATHY: ICD-10-CM

## 2025-02-20 DIAGNOSIS — E03.9 ACQUIRED HYPOTHYROIDISM: ICD-10-CM

## 2025-02-20 DIAGNOSIS — M35.00 SJOGREN'S SYNDROME, WITH UNSPECIFIED ORGAN INVOLVEMENT (HCC): ICD-10-CM

## 2025-02-20 DIAGNOSIS — K21.00 GASTROESOPHAGEAL REFLUX DISEASE WITH ESOPHAGITIS WITHOUT HEMORRHAGE: ICD-10-CM

## 2025-02-20 DIAGNOSIS — F41.9 ANXIETY: ICD-10-CM

## 2025-02-20 DIAGNOSIS — G89.29 CHRONIC RIGHT-SIDED LOW BACK PAIN WITH RIGHT-SIDED SCIATICA: ICD-10-CM

## 2025-02-20 DIAGNOSIS — M54.12 CERVICAL RADICULOPATHY: ICD-10-CM

## 2025-02-20 DIAGNOSIS — M54.12 CERVICAL NEURITIS: ICD-10-CM

## 2025-02-20 DIAGNOSIS — M50.90 CERVICAL DISC DISEASE: ICD-10-CM

## 2025-02-20 DIAGNOSIS — K44.9 HIATAL HERNIA: ICD-10-CM

## 2025-02-20 DIAGNOSIS — G43.709 CHRONIC MIGRAINE W/O AURA W/O STATUS MIGRAINOSUS, NOT INTRACTABLE: ICD-10-CM

## 2025-02-20 LAB
ALBUMIN SERPL-MCNC: 4.2 G/DL (ref 3.2–4.8)
ALBUMIN/GLOB SERPL: 1.9 {RATIO} (ref 1–2)
ALP LIVER SERPL-CCNC: 69 U/L
ALT SERPL-CCNC: 22 U/L
ANION GAP SERPL CALC-SCNC: 9 MMOL/L (ref 0–18)
AST SERPL-CCNC: 16 U/L (ref ?–34)
BILIRUB SERPL-MCNC: 0.3 MG/DL (ref 0.2–1.1)
BUN BLD-MCNC: 24 MG/DL (ref 9–23)
BUN/CREAT SERPL: 33.3 (ref 10–20)
CALCIUM BLD-MCNC: 8.5 MG/DL (ref 8.7–10.4)
CHLORIDE SERPL-SCNC: 104 MMOL/L (ref 98–112)
CO2 SERPL-SCNC: 27 MMOL/L (ref 21–32)
CREAT BLD-MCNC: 0.72 MG/DL
CRP SERPL-MCNC: <0.4 MG/DL (ref ?–1)
EGFRCR SERPLBLD CKD-EPI 2021: 91 ML/MIN/1.73M2 (ref 60–?)
ERYTHROCYTE [SEDIMENTATION RATE] IN BLOOD: 10 MM/HR
FASTING STATUS PATIENT QL REPORTED: NO
GLOBULIN PLAS-MCNC: 2.2 G/DL (ref 2–3.5)
GLUCOSE BLD-MCNC: 79 MG/DL (ref 70–99)
OSMOLALITY SERPL CALC.SUM OF ELEC: 293 MOSM/KG (ref 275–295)
POTASSIUM SERPL-SCNC: 3.9 MMOL/L (ref 3.5–5.1)
PROT SERPL-MCNC: 6.4 G/DL (ref 5.7–8.2)
RHEUMATOID FACT SERPL-ACNC: <3.5 IU/ML (ref ?–14)
SODIUM SERPL-SCNC: 140 MMOL/L (ref 136–145)
T4 FREE SERPL-MCNC: 1.3 NG/DL (ref 0.8–1.7)
TSI SER-ACNC: 0.07 UIU/ML (ref 0.55–4.78)

## 2025-02-20 PROCEDURE — 84443 ASSAY THYROID STIM HORMONE: CPT

## 2025-02-20 PROCEDURE — 86200 CCP ANTIBODY: CPT

## 2025-02-20 PROCEDURE — 85652 RBC SED RATE AUTOMATED: CPT

## 2025-02-20 PROCEDURE — 86038 ANTINUCLEAR ANTIBODIES: CPT

## 2025-02-20 PROCEDURE — 80053 COMPREHEN METABOLIC PANEL: CPT

## 2025-02-20 PROCEDURE — 84439 ASSAY OF FREE THYROXINE: CPT

## 2025-02-20 PROCEDURE — 86140 C-REACTIVE PROTEIN: CPT

## 2025-02-20 PROCEDURE — 86225 DNA ANTIBODY NATIVE: CPT

## 2025-02-20 PROCEDURE — 99203 OFFICE O/P NEW LOW 30 MIN: CPT | Performed by: FAMILY MEDICINE

## 2025-02-20 PROCEDURE — 99499 UNLISTED E&M SERVICE: CPT | Performed by: FAMILY MEDICINE

## 2025-02-20 PROCEDURE — 36415 COLL VENOUS BLD VENIPUNCTURE: CPT

## 2025-02-20 PROCEDURE — 86431 RHEUMATOID FACTOR QUANT: CPT

## 2025-02-20 RX ORDER — OXYMETAZOLINE HYDROCHLORIDE 30 G/1
1 CREAM TOPICAL EVERY MORNING
COMMUNITY
Start: 2025-01-30

## 2025-02-20 RX ORDER — PREDNISONE 10 MG/1
TABLET ORAL
COMMUNITY
Start: 2025-02-07

## 2025-02-20 RX ORDER — PREDNISONE 20 MG/1
TABLET ORAL
COMMUNITY
Start: 2025-02-05

## 2025-02-20 NOTE — PROGRESS NOTES
Subjective:   Debbie Peterson is a 69 year old female who presents for Establish Care, Thyroid Problem, Shortness Of Breath, Cough, and Rash     Patient presents to \[facility]\"" care. Presents with agent/ that is assisting patient coordinate appointments and care:    Sacro-iliac pain , Ischial bursitis of right side, Lumbar spondylosis, Piriformis muscle pain , L5 radiculopathy confirmed by EMG in 2024  Patient was following in the past with [facility] medicine.  No longer following with [facility].  Currently following with [place] physiatrist Dr. Sadler.  Does have history on psoriasis on topicals.  However has had outbreak of new rash.  Has seen dermatology who has started patient on prednisone.  Planning to taper soon.  Patient has found that many of her symptoms including joint pain shortness of breath have significantly improved with steroids.  Patient is very concerned about weaning off.  Does have upcoming appointment with rheumatology but is not until April.    History of lupus and Sjogren's  Patient is awaiting to establish with rheumatology.  Appointment currently scheduled for April.  On methotrexate.    Migraines  Following with neurology Dr Reed.  Doing well on Emgality and sumatriptan.      GI  Patient was having loose stools previously on colestipol.  This has since been stopped and patient is managing stools with loperamide while awaiting to establish with endocrinology given recently noted low TSH.    Insomnia  Patient finds it very difficult to sleep due to pain as described above.  Has been on lorazepam for years.  Stable declines need for refill at this time    History/Other:    Chief Complaint Reviewed and Verified  No Further Nursing Notes to   Review  Tobacco Reviewed  Allergies Reviewed  Medications Reviewed    Problem List Reviewed  Medical History Reviewed  Surgical History   Reviewed  OB Status Reviewed  Family History Reviewed   Social History   Reviewed         Tobacco:  She has never smoked tobacco.    Current Outpatient Medications   Medication Sig Dispense Refill    predniSONE 20 MG Oral Tab       predniSONE 10 MG Oral Tab       RHOFADE 1 % External Cream Apply 1 Application topically every morning.      Folic Acid (FOLATE OR) Take by mouth.      Galcanezumab-gnlm (EMGALITY) 120 MG/ML Subcutaneous Solution Auto-injector Inject 120 mg into the skin every 30 (thirty) days. 1 each 0    SUMAtriptan Succinate (IMITREX) 100 MG Oral Tab Take 1 tablet (100 mg total) by mouth every 2 (two) hours as needed for Migraine. Use at onset; repeat once after 2 HRS-ONLY 2 doses of any sumatriptan (nasal, oral, or injectable) IN 24 HR MAX. 27 tablet 3    Fluocinonide 0.05 % External Solution Apply 1 Application  topically daily. 60 mL 0    azelastine 0.1 % Nasal Solution 2 sprays by Nasal route 2 (two) times daily. 90 mL 1    betamethasone dipropionate 0.05 % External Lotion Apply 2 mL topically daily. To scalp 180 mL 0    famotidine 20 MG Oral Tab Take 1 tablet (20 mg total) by mouth nightly. 90 tablet 1    ipratropium 0.06 % Nasal Solution 2 sprays by Nasal route 4 (four) times daily. 42 mL 3    levothyroxine 150 MCG Oral Tab Take 1 tablet (150 mcg total) by mouth before breakfast. 90 tablet 3    fluticasone-umeclidin-vilant (TRELEGY ELLIPTA) 100-62.5-25 MCG/ACT Inhalation Aerosol Powder, Breath Activated Inhale 1 puff into the lungs daily.      Ivermectin 1 % External Cream APPLY THIN LAYER TOPICALLY TO FACE EVERY DAY      valACYclovir 500 MG Oral Tab Take 1 tablet (500 mg total) by mouth daily.      Ibuprofen (MOTRIN OR) Take 200 mg by mouth.      Azelaic Acid (FINACEA) 15 % External Gel Apply topically daily. 1 each 0    cholecalciferol 50 MCG (2000 UT) Oral Cap Take 1 capsule (2,000 Units total) by mouth daily. Take 2 tablets daily 90 capsule 0    loperamide 2 MG Oral Cap Take 1 capsule (2 mg total) by mouth 4 (four) times daily as needed for  Diarrhea. 90 capsule 0    Naltrexone Does not apply Powder compound 1 mg capsules to be taken orally daily. (Patient not taking: Reported on 2/20/2025) 30 each 3    LORazepam 1 MG Oral Tab Take 1 tablet (1 mg total) by mouth 3 (three) times daily. 90 tablet 3    ondansetron (ZOFRAN) 4 mg tablet Take 1 tablet (4 mg total) by mouth every 8 (eight) hours as needed for Nausea. 30 tablet 3    methotrexate 2.5 MG Oral Tab Take 3 tablets (7.5 mg total) by mouth once a week.      mometasone furoate 50 MCG/ACT Nasal Suspension 1 spray by Nasal route daily. 51 g 2         Review of Systems:  Review of Systems   Constitutional: Negative.    Respiratory: Negative.     Cardiovascular: Negative.    Gastrointestinal: Negative.    Musculoskeletal:  Positive for arthralgias (diffuse).   Skin: Negative.    Neurological: Negative.        Objective:   /88   Pulse 110   Ht 5' 4\" (1.626 m)   Wt 154 lb (69.9 kg)   SpO2 98%   BMI 26.43 kg/m²  Estimated body mass index is 26.43 kg/m² as calculated from the following:    Height as of this encounter: 5' 4\" (1.626 m).    Weight as of this encounter: 154 lb (69.9 kg).  Physical Exam  Vitals and nursing note reviewed.   Constitutional:       General: She is not in acute distress.     Appearance: Normal appearance.   HENT:      Head: Normocephalic and atraumatic.   Eyes:      General:         Right eye: No discharge.         Left eye: No discharge.      Comments: Extraocular eye movements grossly intact   Cardiovascular:      Rate and Rhythm: Normal rate and regular rhythm.      Heart sounds: No murmur heard.     No friction rub. No gallop.   Pulmonary:      Effort: Pulmonary effort is normal. No respiratory distress.      Breath sounds: Normal breath sounds. No stridor. No wheezing or rhonchi.   Musculoskeletal:      Comments: Normal gait   Skin:     Findings: No rash.   Neurological:      General: No focal deficit present.      Mental Status: She is alert. Mental status is at  baseline.   Psychiatric:         Mood and Affect: Mood normal.         Behavior: Behavior normal.           Assessment & Plan:   1. Polyarthralgia (Primary)  -     Connective Tissue Disease (TIRSO) Screen, Reflex Specific Antibody; Future; Expected date: 02/20/2025  -     Rheumatoid Arthritis Factor; Future; Expected date: 02/20/2025  -     Cyclic Citrullinate Pep. IGG; Future; Expected date: 02/20/2025  -     Sed Rate, Westergren (Automated); Future; Expected date: 02/20/2025  -     C-Reactive Protein; Future; Expected date: 02/20/2025  -     Comp Metabolic Panel (14); Future; Expected date: 02/20/2025    Ordered preliminary labs to look into more rheumatologic etiology for polyarthralgias.  This provider did message Dr. Mcpherson personally to see if patient could be seen sooner for further evaluation and workup.    2. Acquired hypothyroidism  -     TSH and Free T4; Future; Expected date: 04/03/2025  -     Comp Metabolic Panel (14); Future; Expected date: 02/20/2025    Noted low TSH.  No T4 ordered.  Ordered repeat does have endocrinology appointment tomorrow    3. Sjogren's syndrome, with unspecified organ involvement (HCC)  -     Comp Metabolic Panel (14); Future; Expected date: 02/20/2025    Stable.  On methotrexate.  Is awaiting to establish with rheumatology please see plan above    4. Sedative, hypnotic or anxiolytic use, unspecified with unspecified sedative, hypnotic or anxiolytic-induced disorder (HCC)  11. Anxiety  Patient has been stable on lorazepam for years.  Will continue to fill for patient.    5. Chronic migraine w/o aura w/o status migrainosus, not intractable  Now well-controlled.  Following with neurology.    6. Cervical neuritis  7. left C7-8 radiculopathy  8. C6-7 left > right mild-mod diffuse, C5-6 mild-mod central, C4-5 mild central, C3-4 left mild foraminal bulging discs  9. right L3-4 radiculopathy clinically and chronic right L5 radiculopathy on EMG  10. L5-S1 right mild foraminal bulging  disc  12. Chronic right-sided low back pain with right-sided sciatica  Patient following with physiatry Dr. Sadler.  Is also working on establishing with rheumatology to address any potential rheumatologic etiology to symptoms.  Also establishing with endocrinology to workup any thyroid etiology to symptoms.    13. Gastroesophageal reflux disease with esophagitis without hemorrhage  14. Hiatal hernia  Stable following with GI.  Patient takes omeprazole as needed.      Return if symptoms worsen or fail to improve.    Marina Spencer MD, 2/20/2025, 1:58 PM

## 2025-02-21 ENCOUNTER — PATIENT MESSAGE (OUTPATIENT)
Facility: CLINIC | Age: 70
End: 2025-02-21

## 2025-02-21 DIAGNOSIS — M25.50 PAIN IN JOINT, MULTIPLE SITES: Primary | ICD-10-CM

## 2025-02-21 DIAGNOSIS — E83.51 LOW CALCIUM LEVELS: Primary | ICD-10-CM

## 2025-02-21 DIAGNOSIS — M32.9 SYSTEMIC LUPUS ERYTHEMATOSUS, UNSPECIFIED SLE TYPE, UNSPECIFIED ORGAN INVOLVEMENT STATUS (HCC): ICD-10-CM

## 2025-02-21 DIAGNOSIS — M35.00 SJOGREN'S SYNDROME, WITH UNSPECIFIED ORGAN INVOLVEMENT (HCC): ICD-10-CM

## 2025-02-21 LAB
CCP IGG SERPL-ACNC: 0.9 U/ML (ref 0–6.9)
DSDNA IGG SERPL IA-ACNC: <0.6 IU/ML
ENA AB SER QL IA: 0.2 UG/L
ENA AB SER QL IA: NEGATIVE

## 2025-02-25 ENCOUNTER — PATIENT MESSAGE (OUTPATIENT)
Facility: CLINIC | Age: 70
End: 2025-02-25

## 2025-02-25 ENCOUNTER — OFFICE VISIT (OUTPATIENT)
Dept: NEUROLOGY | Facility: CLINIC | Age: 70
End: 2025-02-25
Payer: MEDICARE

## 2025-02-25 DIAGNOSIS — M54.81 OCCIPITAL NEURALGIA OF LEFT SIDE: ICD-10-CM

## 2025-02-25 DIAGNOSIS — G43.709 CHRONIC MIGRAINE W/O AURA W/O STATUS MIGRAINOSUS, NOT INTRACTABLE: Primary | ICD-10-CM

## 2025-02-25 DIAGNOSIS — G43.001 MIGRAINE WITHOUT AURA AND WITH STATUS MIGRAINOSUS, NOT INTRACTABLE: ICD-10-CM

## 2025-02-25 DIAGNOSIS — G25.0 ESSENTIAL TREMOR: ICD-10-CM

## 2025-02-25 DIAGNOSIS — Z91.89: Primary | ICD-10-CM

## 2025-02-25 PROCEDURE — G2211 COMPLEX E/M VISIT ADD ON: HCPCS | Performed by: OTHER

## 2025-02-25 PROCEDURE — 99214 OFFICE O/P EST MOD 30 MIN: CPT | Performed by: OTHER

## 2025-02-25 RX ORDER — GALCANEZUMAB 120 MG/ML
120 INJECTION, SOLUTION SUBCUTANEOUS
Qty: 1 EACH | Refills: 11 | Status: SHIPPED | OUTPATIENT
Start: 2025-02-25 | End: 2026-02-25

## 2025-02-25 RX ORDER — RIMEGEPANT SULFATE 75 MG/75MG
75 TABLET, ORALLY DISINTEGRATING ORAL AS NEEDED
Qty: 8 TABLET | Refills: 11 | Status: SHIPPED | OUTPATIENT
Start: 2025-02-25 | End: 2026-02-25

## 2025-02-25 RX ORDER — SUMATRIPTAN SUCCINATE 100 MG/1
100 TABLET ORAL EVERY 2 HOUR PRN
Qty: 27 TABLET | Refills: 3 | Status: SHIPPED | OUTPATIENT
Start: 2025-02-25

## 2025-02-25 NOTE — PROGRESS NOTES
[facility] [address], [address]  [facility] [address]  [phone]            Neurology follow-up visit     Referred By: Dr. Melton ref. provider found    Chief Complaint:   Chief Complaint   Patient presents with    Migraine     LOV 8/6/2024 Seen for Migraine.   Patient here today 6 month follow up for medication management Galcanezumab-gnlm (EMGALITY),  . Denies numbness, hot/cold sensation, tingling, dizziness or nausea.        HPI:     Debbie Peterson is a 69 year old female, who presents for history of migraines, tremors.  Patient has been seen by different physicians in the past, she was living in [place] at one time.  She was also followed with [facility] specialists.  She had history of trigeminal neuralgia but has improved since 2019.  That was on the right side.  However since at least 2014 she has been developing migraines in the left side of the head, described as typical migraines, associate with throbbing sensation, light sensitive, nausea.    She had hard time breaking the cycle of those headaches.  Also history of tremors.  Mostly at the action and posture.  Does not affect her daily activities overall that much.  Her grandmother in her 90s also had history of tremors.      Nerve blocks were done at that time.    Patient came back for follow-up in May 2024.  MRI of the brain was scheduled.    Headaches persisted, essentially daily basis, mostly behind the left eye, associated with light sensitivity, noise sensitivity, sometimes she cannot get out of bed.  Therefore propranolol was tried, with the hope of treatment of tremors and migraines at the same time.  However apparently she had a rash that was attributed to propranolol.  Therefore she stopped.  Rash has resolved.  However she continues with essentially daily headache, especially behind the left eye.  She was worried frustrated by that fact.  She was also having difficulties falling asleep since  headaches are preventing her getting good sleep.    At that point patient was tried on Emgality.  MRI of the brain and IAC was done in outside institution, it was not remarkable.  Patient came back for follow-up in August 2024, She was reporting the headaches are much more improved with Emgality.  Headaches might breakthrough by the end of the month.  At that time she will use Imitrex and blood work.  The meantime propranolol was already helpful as needed for her essential tremors.  She was interested in taking it all the time since she was much more active in all days that her headaches were better controlled.    The patient reports a complex medical history over the past 8-9 months as reported on the next visit in February 2025. Initially, she experienced thyrotoxicosis, resulting in hair loss, arrhythmias, and tremors. Her TSH was profoundly inhibited. Following a reduction in levothyroxine dosage, she became profoundly hypothyroid, leading to somnolence and inability to function normally throughout January. As of two days ago, her T4 is within normal range, but she continues to feel unwell.    Concurrently, in early December, the patient experienced an exacerbation of her autoimmune symptoms. She developed a new form of arthritis, prompting the initiation of prednisone therapy by dermatology. After 18 days of prednisone, she reports mild alleviation of pain in her hands, elbows, and feet, as well as slight improvement in dyspnea. However, she continues to struggle with these symptoms.    Regarding migraines, the patient has been using Emgality with good effect. However, she experiences difficulties obtaining the medication on time due to pharmacy issues, leading to breakthrough migraines. These breakthrough episodes are not effectively managed with Nurtec. She can achieve some relief with oral Imitrex, but remains in pain and unable to socialize or drive for approximately 4 hours.    The patient reports  discontinuing propranolol in December due to ankle edema and her thyroid condition. Since then, she has experienced weakness, painful and swollen hands with difficulty coordinating finger movements, and tremors. She attributes these symptoms to either her thyroid dysfunction or autoimmune issues, possibly including psoriatic arthritis.      Past Medical History:    Arthritis    Autoimmune disorder (HCC)    Lupus    Cervical disc displacement    C4-C5, C5-C6 disc displacement    Chronic pain    Right foot after 2020 foot surgery    Depression    Daughter  (adopted)    Difficulty urinating    Hearing impaired person, bilateral    Hiatal hernia with gastroesophageal reflux    Hypothyroidism    Infertility    Interstitial cystitis    Long COVID    Lupus    Migraine    Migraines    Peripheral neuropathy    PONV (postoperative nausea and vomiting)    Reactive depression (situational)    when daughter     Scalp psoriasis    SI (sacroiliac) joint dysfunction    Sjogren's disease (HCC)    Traumatic brain injury (HCC)    Tremor    Trigeminal neuralgia pain    Vaginal stricture    Vasculitis    nose and sinus    Vertigo       Past Surgical History:   Procedure Laterality Date    Arthroscopy, shoulder, surgical; w/rotator cuff repair Right     subclavian reconstruction      Biopsy of skin lesion  2020    Cataract extraction Bilateral     Colonoscopy  2012    Correct bunion,simple Right     Egd  2012    Foot fracture surgery Right     Hysteroscopy,diagnostic  2024    endometrial polypectomy    Other surgical history      heel: sural nerve neuroma excision -- achilles tendon accidentally injured during surgery       Social history:  History   Smoking Status    Never   Smokeless Tobacco    Never     History   Alcohol Use Not Currently     History   Drug Use Unknown       Family History   Problem Relation Age of Onset    Hypertension Mother     Obesity Mother     Depression Mother     Dementia  Father         dementia induced by MRSA sepsis (cause of death)    Infectious Disease Father         MRSA sepsis (cause of death)    Fibromyalgia Sister     Alcohol and Other Disorders Associated Brother         alcoholism    Depression Brother     Other (drug use) Brother     Breast Cancer Maternal Aunt 65    Depression Other         family h/o    Ovarian Cancer Neg     Colon Cancer Neg          Current Outpatient Medications:     predniSONE 20 MG Oral Tab, , Disp: , Rfl:     predniSONE 10 MG Oral Tab, , Disp: , Rfl:     RHOFADE 1 % External Cream, Apply 1 Application topically every morning., Disp: , Rfl:     Folic Acid (FOLATE OR), Take by mouth., Disp: , Rfl:     loperamide 2 MG Oral Cap, Take 1 capsule (2 mg total) by mouth 4 (four) times daily as needed for Diarrhea., Disp: 90 capsule, Rfl: 0    Galcanezumab-gnlm (EMGALITY) 120 MG/ML Subcutaneous Solution Auto-injector, Inject 120 mg into the skin every 30 (thirty) days., Disp: 1 each, Rfl: 0    SUMAtriptan Succinate (IMITREX) 100 MG Oral Tab, Take 1 tablet (100 mg total) by mouth every 2 (two) hours as needed for Migraine. Use at onset; repeat once after 2 HRS-ONLY 2 doses of any sumatriptan (nasal, oral, or injectable) IN 24 HR MAX., Disp: 27 tablet, Rfl: 3    Naltrexone Does not apply Powder, compound 1 mg capsules to be taken orally daily. (Patient not taking: Reported on 2/20/2025), Disp: 30 each, Rfl: 3    LORazepam 1 MG Oral Tab, Take 1 tablet (1 mg total) by mouth 3 (three) times daily., Disp: 90 tablet, Rfl: 3    ondansetron (ZOFRAN) 4 mg tablet, Take 1 tablet (4 mg total) by mouth every 8 (eight) hours as needed for Nausea., Disp: 30 tablet, Rfl: 3    Fluocinonide 0.05 % External Solution, Apply 1 Application  topically daily., Disp: 60 mL, Rfl: 0    azelastine 0.1 % Nasal Solution, 2 sprays by Nasal route 2 (two) times daily., Disp: 90 mL, Rfl: 1    betamethasone dipropionate 0.05 % External Lotion, Apply 2 mL topically daily. To scalp, Disp: 180  mL, Rfl: 0    famotidine 20 MG Oral Tab, Take 1 tablet (20 mg total) by mouth nightly., Disp: 90 tablet, Rfl: 1    ipratropium 0.06 % Nasal Solution, 2 sprays by Nasal route 4 (four) times daily., Disp: 42 mL, Rfl: 3    levothyroxine 150 MCG Oral Tab, Take 1 tablet (150 mcg total) by mouth before breakfast., Disp: 90 tablet, Rfl: 3    fluticasone-umeclidin-vilant (TRELEGY ELLIPTA) 100-62.5-25 MCG/ACT Inhalation Aerosol Powder, Breath Activated, Inhale 1 puff into the lungs daily., Disp: , Rfl:     Ivermectin 1 % External Cream, APPLY THIN LAYER TOPICALLY TO FACE EVERY DAY, Disp: , Rfl:     methotrexate 2.5 MG Oral Tab, Take 3 tablets (7.5 mg total) by mouth once a week., Disp: , Rfl:     valACYclovir 500 MG Oral Tab, Take 1 tablet (500 mg total) by mouth daily., Disp: , Rfl:     mometasone furoate 50 MCG/ACT Nasal Suspension, 1 spray by Nasal route daily., Disp: 51 g, Rfl: 2    Ibuprofen (MOTRIN OR), Take 200 mg by mouth., Disp: , Rfl:     Azelaic Acid (FINACEA) 15 % External Gel, Apply topically daily., Disp: 1 each, Rfl: 0    cholecalciferol 50 MCG (2000 UT) Oral Cap, Take 1 capsule (2,000 Units total) by mouth daily. Take 2 tablets daily, Disp: 90 capsule, Rfl: 0    Allergies   Allergen Reactions    Cyclobenzaprine OTHER (SEE COMMENTS)     Drowsy, nightmares    Gabapentin OTHER (SEE COMMENTS)     Upper and lower extremity ataxia and cognitive impairment     Hydroxychloroquine OTHER (SEE COMMENTS)     eye toxicity    Meloxicam PALPITATIONS, SHORTNESS OF BREATH and TINITUS    Ropinirole OTHER (SEE COMMENTS)     Vertigo     Naproxen OTHER (SEE COMMENTS)    Sulfa Antibiotics HIVES    Trimethoprim HIVES    Biaxin [Clarithromycin] ITCHING    Sulfamethoxazole W/Trimethoprim ITCHING       ROS:   As in HPI, the rest of the 14 system review was done and was negative      Physical Exam:  There were no vitals filed for this visit.      General: No apparent distress, well nourished, well groomed.  Head- Normocephalic,  atraumatic  Eyes- No redness or swelling  ENT- Hearing intake, normal glutition  Neck- No masses or adenopathy  Cv: pulses were palpable and normal, no cyanosis or edema     Neurological:     Mental Status- Alert and oriented x3.  Normal attention span and concentration  Thought process intact  Memory intact- recent and remote  Mood intact  Fund of knowledge appropriate for education and age    Language intact including: comprehension, naming, repetition, vocabulary    Cranial Nerves:    VII. Face symmetric, no facial weakness  VIII. Hearing intact to whisper.  IX. Pallet elevates symmetrically.  XI. Shoulder shrug is intact  XII. Tongue is midline    Motor Exam:  Muscle tone normal  No atrophy or fasciculations  Strength- upper extremities 5/5 proximally and distally                  Rapid alternating movements intact    Gait:  Normal posture  Normal physiologic      Labs:    Lab Results   Component Value Date    TSH 0.065 (L) 02/20/2025     Lab Results   Component Value Date    HDL 91 (H) 05/30/2023    LDL 88 05/30/2023    TRIG 80 05/30/2023     Lab Results   Component Value Date    HGB 13.8 02/21/2024    HCT 40.6 02/21/2024    MCV 95.5 02/21/2024    WBC 6.1 02/21/2024    .0 02/21/2024      Lab Results   Component Value Date    BUN 24 (H) 02/20/2025    CA 8.5 (L) 02/20/2025    ALT 22 02/20/2025    AST 16 02/20/2025    ALKPHOS 47 09/23/2014    ALB 4.2 02/20/2025     02/20/2025    K 3.9 02/20/2025     02/20/2025    CO2 27.0 02/20/2025      I have reviewed labs.      Assessment   1. Essential tremor    Hand tremors and coordination issues:  - Monitor tremors and coordination issues  - Defer additional medication for tremor management until thyroid function stabilizes  - Reassess need for tremor treatment at follow-up appointment  - Note: Patient reports tremors, twitching, and difficulty coordinating fingers  - Note: Previously used propranolol but discontinued due to ankle edema    2. Chronic  migraine w/o aura w/o status migrainosus, not intractable    Patient had poor side effects with history of Botox, propranolol, topiramate, possible Depakote, amitriptyline, therefore Emgality was tried and was quite helpful.  However it does not last the whole 4 weeks.    Migraine management:  - Adjust Emgality prescription to every 3rd Wednesday for easier pharmacy access  - Initiate preventive use of Nurtec: take every other day during the last week of each month  - Continue Imitrex for breakthrough migraines as needed  - Follow up in 3 months to assess efficacy of new regimen  - Note: Good response to Emgality, but challenges with medication access  - Note: Nurtec ineffective for acute treatment  - Note: Imitrex provides partial relief but leaves residual pain and functional impairment    3. Migraine without aura and with status migrainosus, not intractable           Education and counseling provided to patient. Instructed patient to call my office or seek medical attention immediately if symptoms worsen.  Patient verbalized understanding of information given. All questions were answered. All side effects of drugs were discussed.       Return to clinic in: No follow-ups on file.    Joshua Irving MD

## 2025-02-27 ENCOUNTER — ORDER TRANSCRIPTION (OUTPATIENT)
Dept: ADMINISTRATIVE | Facility: HOSPITAL | Age: 70
End: 2025-02-27

## 2025-02-27 DIAGNOSIS — Z91.89: Primary | ICD-10-CM

## 2025-03-06 ENCOUNTER — TELEPHONE (OUTPATIENT)
Facility: CLINIC | Age: 70
End: 2025-03-06

## 2025-03-06 ENCOUNTER — OFFICE VISIT (OUTPATIENT)
Facility: CLINIC | Age: 70
End: 2025-03-06
Payer: MEDICARE

## 2025-03-06 ENCOUNTER — LAB ENCOUNTER (OUTPATIENT)
Dept: LAB | Age: 70
End: 2025-03-06
Attending: FAMILY MEDICINE
Payer: MEDICARE

## 2025-03-06 VITALS
HEART RATE: 88 BPM | HEIGHT: 64 IN | SYSTOLIC BLOOD PRESSURE: 132 MMHG | WEIGHT: 159 LBS | BODY MASS INDEX: 27.14 KG/M2 | OXYGEN SATURATION: 98 % | DIASTOLIC BLOOD PRESSURE: 86 MMHG

## 2025-03-06 DIAGNOSIS — E03.9 ACQUIRED HYPOTHYROIDISM: Primary | ICD-10-CM

## 2025-03-06 DIAGNOSIS — L40.50 PSORIATIC ARTHRITIS (HCC): ICD-10-CM

## 2025-03-06 DIAGNOSIS — E03.9 ACQUIRED HYPOTHYROIDISM: ICD-10-CM

## 2025-03-06 DIAGNOSIS — M35.00 SJOGREN'S SYNDROME, WITH UNSPECIFIED ORGAN INVOLVEMENT (HCC): ICD-10-CM

## 2025-03-06 DIAGNOSIS — F40.240 CLAUSTROPHOBIA: ICD-10-CM

## 2025-03-06 DIAGNOSIS — G25.0 ESSENTIAL TREMOR: ICD-10-CM

## 2025-03-06 LAB — THYROPEROXIDASE AB SERPL-ACNC: 62 U/ML (ref ?–60)

## 2025-03-06 PROCEDURE — 83520 IMMUNOASSAY QUANT NOS NONAB: CPT

## 2025-03-06 PROCEDURE — 99214 OFFICE O/P EST MOD 30 MIN: CPT | Performed by: FAMILY MEDICINE

## 2025-03-06 PROCEDURE — 84445 ASSAY OF TSI GLOBULIN: CPT

## 2025-03-06 PROCEDURE — 36415 COLL VENOUS BLD VENIPUNCTURE: CPT

## 2025-03-06 PROCEDURE — 86376 MICROSOMAL ANTIBODY EACH: CPT

## 2025-03-06 RX ORDER — APREMILAST 10-20-30MG
KIT ORAL
COMMUNITY
Start: 2025-02-25

## 2025-03-06 RX ORDER — LORAZEPAM 1 MG/1
1 TABLET ORAL 3 TIMES DAILY PRN
Qty: 15 TABLET | Refills: 0 | Status: SHIPPED | OUTPATIENT
Start: 2025-03-06

## 2025-03-06 NOTE — TELEPHONE ENCOUNTER
Spoke with Madeline Small, Date of Birth verified.  She was informed of MD recommendation, she stated understanding.

## 2025-03-06 NOTE — TELEPHONE ENCOUNTER
Madeline, Pharmacist Stated pt p/u rx lorazepam 1 mg 12/27/24- 90 day supply from a different Dr     Stated she should have enough - need documentation from Dr - since this is a Red Flag     Asking if Rx should be dispensed   Stated the other Dr was treating Anxiety-     Please advise

## 2025-03-06 NOTE — PROGRESS NOTES
Subjective:   eDbbie Peterson is a 69 year old female who presents for Follow - Up (Discuss recent specialist appts)     Patient presents for  visits.    Thyroid  Was told by Dr Du that TIRSO likely low due to being in hypothyroid state. Patient is still concerned about some thyroid etiology. Still in pain but improving. Patient is having episodes of tachycardia and jittery. These episodes feel similar to when was hyperthyroid in the past. Patient has looked into rapid cycling regarding the thyroid. Patient also found that propranolol can precipitate autoimmune flares. Was on propranolol for essential tremor in the past.  Patient is wondering if antibodies can be checked for thyroid.    Rheum  Patient saw Dr Izzy Lo at [facility]. Does agree that patient has psoriatic arthritis and sjogrens. Started otezla. Remains on methotrexate, folate. Is currently tapering prednisone      Patient is on lorazepam for sleep. However will occassionaly use for MRIs given phobia and essential tremor. Due to pain and other medication side effects was having to take an extra half a tablet a few nights. This has led to patient potentially being short and will not have enough to continue take as needed and for upcoming MRIs.     Patient also has upcoming appointment with ortho to further discuss joint changes and potential bracing from psoriatic arthitis.    History/Other:    Chief Complaint Reviewed and Verified  Nursing Notes Reviewed and   Verified  Tobacco Reviewed  Allergies Reviewed  Medications Reviewed    Problem List Reviewed  Medical History Reviewed  Surgical History   Reviewed  Family History Reviewed         Tobacco:  She has never smoked tobacco.    Current Outpatient Medications   Medication Sig Dispense Refill    Apremilast (OTEZLA) 10 & 20 & 30 MG Oral Tablet Therapy Pack       LORazepam 1 MG Oral Tab Take 1 tablet (1 mg total) by mouth 3 (three) times daily  as needed for Anxiety (tremor and upcoming MRIs). 15 tablet 0    Galcanezumab-gnlm (EMGALITY) 120 MG/ML Subcutaneous Solution Auto-injector Inject 120 mg into the skin every 21 days. Every 3rd Wednesday 1 each 11    Rimegepant Sulfate (NURTEC) 75 MG Oral Tablet Dispersible Take 75 mg by mouth as needed. Take one tablet at onset of migraine.  Maximum dose in 24 hours is 1 tablet (75mg). 8 tablet 11    SUMAtriptan Succinate (IMITREX) 100 MG Oral Tab Take 1 tablet (100 mg total) by mouth every 2 (two) hours as needed for Migraine. Use at onset; repeat once after 2 HRS-ONLY 2 doses of any sumatriptan (nasal, oral, or injectable) IN 24 HR MAX. 27 tablet 3    predniSONE 20 MG Oral Tab       predniSONE 10 MG Oral Tab       RHOFADE 1 % External Cream Apply 1 Application topically every morning.      Folic Acid (FOLATE OR) Take by mouth.      loperamide 2 MG Oral Cap Take 1 capsule (2 mg total) by mouth 4 (four) times daily as needed for Diarrhea. (Patient taking differently: Take 1 capsule (2 mg total) by mouth in the morning and 1 capsule (2 mg total) before bedtime.) 90 capsule 0    Naltrexone Does not apply Powder compound 1 mg capsules to be taken orally daily. 30 each 3    LORazepam 1 MG Oral Tab Take 1 tablet (1 mg total) by mouth 3 (three) times daily. 90 tablet 3    ondansetron (ZOFRAN) 4 mg tablet Take 1 tablet (4 mg total) by mouth every 8 (eight) hours as needed for Nausea. 30 tablet 3    Fluocinonide 0.05 % External Solution Apply 1 Application  topically daily. 60 mL 0    azelastine 0.1 % Nasal Solution 2 sprays by Nasal route 2 (two) times daily. 90 mL 1    betamethasone dipropionate 0.05 % External Lotion Apply 2 mL topically daily. To scalp 180 mL 0    famotidine 20 MG Oral Tab Take 1 tablet (20 mg total) by mouth nightly. 90 tablet 1    ipratropium 0.06 % Nasal Solution 2 sprays by Nasal route 4 (four) times daily. 42 mL 3    levothyroxine 150 MCG Oral Tab Take 1 tablet (150 mcg total) by mouth before  breakfast. 90 tablet 3    fluticasone-umeclidin-vilant (TRELEGY ELLIPTA) 100-62.5-25 MCG/ACT Inhalation Aerosol Powder, Breath Activated Inhale 1 puff into the lungs daily.      Ivermectin 1 % External Cream APPLY THIN LAYER TOPICALLY TO FACE EVERY DAY      methotrexate 2.5 MG Oral Tab Take 3 tablets (7.5 mg total) by mouth once a week.      valACYclovir 500 MG Oral Tab Take 1 tablet (500 mg total) by mouth daily.      mometasone furoate 50 MCG/ACT Nasal Suspension 1 spray by Nasal route daily. 51 g 2    Ibuprofen (MOTRIN OR) Take 200 mg by mouth.      Azelaic Acid (FINACEA) 15 % External Gel Apply topically daily. 1 each 0    cholecalciferol 50 MCG (2000 UT) Oral Cap Take 1 capsule (2,000 Units total) by mouth daily. Take 2 tablets daily 90 capsule 0         Review of Systems:  Review of Systems   Constitutional: Negative.    Respiratory: Negative.     Cardiovascular: Negative.    Gastrointestinal: Negative.    Musculoskeletal:  Positive for arthralgias (diffuse polyarthralgias (improving)).   Skin: Negative.    Neurological:  Positive for tremors.   Psychiatric/Behavioral:  The patient is nervous/anxious.          Objective:   /86   Pulse 88   Ht 5' 4\" (1.626 m)   Wt 159 lb (72.1 kg)   SpO2 98%   BMI 27.29 kg/m²  Estimated body mass index is 27.29 kg/m² as calculated from the following:    Height as of this encounter: 5' 4\" (1.626 m).    Weight as of this encounter: 159 lb (72.1 kg).  Physical Exam  Vitals and nursing note reviewed.   Constitutional:       General: She is not in acute distress.     Appearance: Normal appearance.   HENT:      Head: Normocephalic and atraumatic.   Eyes:      General:         Right eye: No discharge.         Left eye: No discharge.      Comments: Extraocular eye movements grossly intact   Pulmonary:      Effort: Pulmonary effort is normal.   Musculoskeletal:      Comments: Normal gait   Skin:     Findings: No rash.   Neurological:      General: No focal deficit present.       Mental Status: She is alert. Mental status is at baseline.   Psychiatric:         Mood and Affect: Mood normal.         Behavior: Behavior normal.           Assessment & Plan:   1. Acquired hypothyroidism (Primary)  -     Thyroid Stimulating Immunoglobulin; Future; Expected date: 03/06/2025  -     Thyroid Peroxidase (TPO) AB; Future; Expected date: 03/06/2025  -     Thyrotropin Receptor Antibody, Serum; Future; Expected date: 03/06/2025    Given patient having symptoms similar to when was experiencing hyperthyroidism ordered antibody testing to further investigate.  Advised patient to continue to follow with endocrinology    2. Psoriatic arthritis (HCC)  Pain slightly improving.  Prednisone taper as per dermatology and other management as per rheumatology    3. Sjogren's syndrome, with unspecified organ involvement (HCC)  Stable.  To continue following with rheumatology    4. Essential tremor  5. Claustrophobia  -     LORazepam; Take 1 tablet (1 mg total) by mouth 3 (three) times daily as needed for Anxiety (tremor and upcoming MRIs).  Dispense: 15 tablet; Refill: 0    [place] reviewed today 03/06/25 . No red flags.  Sent an extra supply given short-term need to take extra doses given medication mixup in the past.  Patient may call or message closer to 3/27/25 for usual lorazepam prescription refill when due        No follow-ups on file.    Marina Spencer MD, 3/6/2025, 10:34 AM

## 2025-03-06 NOTE — TELEPHONE ENCOUNTER
I am aware of requested early fill.  I am taking over prescription.  This is to provide extra supply as patient will be having lots of imaging over the upcoming weeks and patient is claustrophobic.     Marina Spencer MD, 03/06/25, 2:35 PM

## 2025-03-08 LAB — THYROTROPIN REC AB: <1.1 IU/L

## 2025-03-09 LAB — THY STIM IMMUNO: <0.1 IU/L

## 2025-03-10 ENCOUNTER — TELEPHONE (OUTPATIENT)
Facility: CLINIC | Age: 70
End: 2025-03-10

## 2025-03-10 NOTE — TELEPHONE ENCOUNTER
Debbie Jimenez Rn Triage (supporting Marina Spencer MD)Yesterday (10:12 AM)       Hello Dr Spencer,  I have been very ill:  -profound fatigue and somnolence all day yesterday  -drenching hot flashes requiring clothes changes at night and 4 time yesterday.  - jitteriness  -swollen face, especially  the undereye area.  It does correspond to dose reduction of prednisone form 20mg qAM to 10 mg qAM.   I need to taper at a much slower rate.   Dr. Candelario had ordered first Rx to tide me over with the acute flare, until  appt. with Derm who treated for new rash (and severe joint pain and tongue fissures) The rash is resolved.   Could you prescribe a tapering dose that is very gradual? I can cobble together a dose of 15 mg for today and tomorrow.  Thank you,   Debbie Peterson

## 2025-03-10 NOTE — TELEPHONE ENCOUNTER
Patient viewed Abigail Stewart message with instructions to call the office regarding her symptoms.    From  Ketty Lei RN To  Debbie Peterson Sent and Delivered  3/10/2025 10:26 AM   Last Read in Abigail Stewart  3/10/2025 10:30 AM by Debbie Peterson

## 2025-04-03 ENCOUNTER — OFFICE VISIT (OUTPATIENT)
Dept: OBGYN CLINIC | Facility: CLINIC | Age: 70
End: 2025-04-03
Payer: MEDICARE

## 2025-04-03 VITALS
SYSTOLIC BLOOD PRESSURE: 120 MMHG | HEIGHT: 64 IN | BODY MASS INDEX: 27.11 KG/M2 | DIASTOLIC BLOOD PRESSURE: 82 MMHG | WEIGHT: 158.81 LBS

## 2025-04-03 DIAGNOSIS — N95.0 POSTMENOPAUSAL BLEEDING: ICD-10-CM

## 2025-04-03 DIAGNOSIS — R39.9 URINARY SYMPTOM OR SIGN: Primary | ICD-10-CM

## 2025-04-03 LAB
APPEARANCE: CLEAR
BILIRUBIN: NEGATIVE
GLUCOSE (URINE DIPSTICK): NEGATIVE MG/DL
KETONES (URINE DIPSTICK): NEGATIVE MG/DL
MULTISTIX LOT#: ABNORMAL NUMERIC
NITRITE, URINE: NEGATIVE
PH, URINE: 5.5 (ref 4.5–8)
PROTEIN (URINE DIPSTICK): NEGATIVE MG/DL
SPECIFIC GRAVITY: 1.01 (ref 1–1.03)
URINE-COLOR: YELLOW
UROBILINOGEN,SEMI-QN: 0.2 MG/DL (ref 0–1.9)

## 2025-04-03 PROCEDURE — 99213 OFFICE O/P EST LOW 20 MIN: CPT | Performed by: OBSTETRICS & GYNECOLOGY

## 2025-04-03 PROCEDURE — 81002 URINALYSIS NONAUTO W/O SCOPE: CPT | Performed by: OBSTETRICS & GYNECOLOGY

## 2025-04-03 PROCEDURE — 87086 URINE CULTURE/COLONY COUNT: CPT | Performed by: OBSTETRICS & GYNECOLOGY

## 2025-04-03 RX ORDER — PREDNISONE 5 MG/1
15 TABLET ORAL EVERY MORNING
COMMUNITY
Start: 2025-03-11

## 2025-04-03 NOTE — PROGRESS NOTES
CC: Patient is here for PMB    HPI: Patient is a 69 year old  presents with 4 -6 W LA cramping, distention after starting Otesla for psoriatic arthritis with diarrhea 4 - 5 x per day. She stopped meds 5 days ago. She noticed some vaginal bleeding when she wiped with small amount on pad. She also has been on high dose prednisone for the past 3 M.     S/p 2024 hysteroscopy with endometrial polypectomy    No LMP recorded. Patient is postmenopausal.    OB History    Para Term  AB Living   3       3     SAB IAB Ectopic Multiple Live Births   2   1          # Outcome Date GA Lbr Alejandro/2nd Weight Sex Type Anes PTL Lv   3 Ectopic               Birth Comments: occurred while in [place] -- no surgery No blood transfusion   2 SAB            1 SAB               Obstetric Comments   Pt cannot recall how many sab had; had IVF       GYN hx:    Hx Prior Abnormal Pap: Yes (13 HPV+)  Pap Date: 23  Pap Result Notes: Neg  Follow Up Recommendation: last mammo: 10/05/2024  LPS:      Past Medical History:    Arthritis    Autoimmune disorder (HCC)    Lupus    Cervical disc displacement    C4-C5, C5-C6 disc displacement    Chronic pain    Right foot after 2020 foot surgery    Depression    Daughter  (adopted)    Difficulty urinating    Hearing impaired person, bilateral    Hiatal hernia with gastroesophageal reflux    Hypothyroidism    Infertility    Interstitial cystitis    Long COVID    Lupus    Migraine    Migraines    Peripheral neuropathy    PONV (postoperative nausea and vomiting)    Reactive depression (situational)    when daughter     Scalp psoriasis    SI (sacroiliac) joint dysfunction    Sjogren's disease (HCC)    Traumatic brain injury (HCC)    Tremor    Trigeminal neuralgia pain    Vaginal stricture    Vasculitis    nose and sinus    Vertigo     Past Surgical History:   Procedure Laterality Date    Arthroscopy, shoulder, surgical; w/rotator cuff repair Right 2012    subclavian  reconstruction      Biopsy of skin lesion  09/2020    Cataract extraction Bilateral 2021    Colonoscopy  2012    Correct bunion,simple Right 2004    Egd  2012    Foot fracture surgery Right 2020    Hysteroscopy,diagnostic  11/07/2024    endometrial polypectomy    Other surgical history  2005    heel: sural nerve neuroma excision -- achilles tendon accidentally injured during surgery     Allergies[1]  Family History   Problem Relation Age of Onset    Hypertension Mother     Obesity Mother     Depression Mother     Dementia Father         dementia induced by MRSA sepsis (cause of death)    Infectious Disease Father         MRSA sepsis (cause of death)    Fibromyalgia Sister     Alcohol and Other Disorders Associated Brother         alcoholism    Depression Brother     Other (drug use) Brother     Breast Cancer Maternal Aunt 65    Depression Other         family h/o    Ovarian Cancer Neg     Colon Cancer Neg      Social History     Socioeconomic History    Marital status: Single     Spouse name: Not on file    Number of children: 0    Years of education: Not on file    Highest education level: Not on file   Occupational History    Occupation: psychiatrist     Comment: retired   Tobacco Use    Smoking status: Never    Smokeless tobacco: Never   Vaping Use    Vaping status: Never Used   Substance and Sexual Activity    Alcohol use: Not Currently    Drug use: Never    Sexual activity: Not Currently   Other Topics Concern     Service Not Asked    Blood Transfusions No    Caffeine Concern Yes     Comment: tea, 1 cup    Occupational Exposure Not Asked    Hobby Hazards Not Asked    Sleep Concern Not Asked    Stress Concern Not Asked    Weight Concern Not Asked    Special Diet Not Asked    Back Care Not Asked    Exercise No    Bike Helmet Not Asked    Seat Belt Not Asked    Self-Exams Not Asked   Social History Narrative    Lives alone    Feels safe    No hx of abuse     Social Drivers of Health     Food Insecurity: No  Food Insecurity (7/6/2022)    Received from [facility], [facility]    Food Insecurity     Currently or in the past 3 months, have you worried your food would run out before you had money to buy more?: No     In the past 12 months, have you run out of food or been unable to get more?: No   Transportation Needs: Unmet Transportation Needs (7/6/2022)    Received from [facility], [facility]    Transportation Needs     Currently or in the past 3 months, has lack of transportation kept you from medical appointments, getting food or medicine, or providing care to a family member?: Not on file     Has the lack of transportation kept you from meetings, work, or from getting things needed for daily living?: Not on file     Medical Transportation Needs?: Yes     Daily Living Transportation Needs? [Peds Only] : Yes   Stress: Not on file   Housing Stability: Low Risk  (7/15/2023)    Received from [facility], [facility]    Housing Stability     Mortgage Payment Concerns?: Not on file     Number of Places Lived in the Last Year: Not on file     Unstable Housing?: Not on file       Medications reviewed. See active list.     /82   Ht 64\"   Wt 158 lb 12.8 oz (72 kg)   BMI 27.26 kg/m²       Exam:   GENERAL: well developed, well nourished, in no apparent distress  ABDOMEN: Soft, non distended; non tender, no masses.  Liver and spleen non-tender, no enlargement. No palpable hernias  GYNE/:  External Genitalia: Normal appearing, no lesions, normal hair distribution   Urethral meatus appear wnl, no abnormal discharge or lesions noted.   Bladder: well supported, urethra wnl, no palpable tenderness or masses, no discharge + small urethral poluyp  Vagina: normal pink mucosa, no lesions, normal clear discharge.   Uterus: midline, mobile, non-tender, firm and smooth  Cervix: pink, no lesions grossly visible, no  discharge  Adnexa: non tender, no palpable masses, normal size  Anus:  No lesions or visible hemorrhoids        A/P: Patient is 69 year old female     1. Urinary symptom or sign  - URINALYSIS NONAUTO W/O SCOPE    2. Postmenopausal bleeding  - Transvaginal US GYNE Only [05932]; Future    DW her likely due to steroid use. Check usn if persists.   Rosmery Morfin MD                [1]   Allergies  Allergen Reactions    Cyclobenzaprine OTHER (SEE COMMENTS)     Drowsy, nightmares    Gabapentin OTHER (SEE COMMENTS)     Upper and lower extremity ataxia and cognitive impairment     Hydroxychloroquine OTHER (SEE COMMENTS)     eye toxicity    Meloxicam PALPITATIONS, SHORTNESS OF BREATH and TINITUS    Ropinirole OTHER (SEE COMMENTS)     Vertigo     Propranolol PAIN     Can precipitate autoimmune flares    Naproxen OTHER (SEE COMMENTS)    Sulfa Antibiotics HIVES    Trimethoprim HIVES    Biaxin [Clarithromycin] ITCHING    Sulfamethoxazole W/Trimethoprim ITCHING

## 2025-04-08 ENCOUNTER — PATIENT MESSAGE (OUTPATIENT)
Facility: CLINIC | Age: 70
End: 2025-04-08

## 2025-04-10 ENCOUNTER — TELEPHONE (OUTPATIENT)
Dept: PHYSICAL MEDICINE AND REHAB | Facility: CLINIC | Age: 70
End: 2025-04-10

## 2025-04-10 DIAGNOSIS — M54.16 LUMBAR RADICULOPATHY: Primary | ICD-10-CM

## 2025-04-10 NOTE — TELEPHONE ENCOUNTER
Patient calling to schedule on appointment for another injection states las injection was on 9/17/2024, she has a follow up appoinment on  5/14/25. Please call to advice if this procedure can be done before this appointment.

## 2025-04-10 NOTE — TELEPHONE ENCOUNTER
Patient has been scheduled for Right L5 Transforaminal Epidural Steroid Injection on 6/20/2025 at the [facility] with Dr. Sadler.   Anesthesia type:  Local  Please note: The [facility] will call the business day prior to discuss the exact time/arrival and additional instructions for your appointment.  Patient was advised that if he/she does receive the covid vaccine it needs to be at least 2 weeks before or after the injection.  Medications and allergies reviewed.  Educated to hold NSAIDS (Aleve, Ibuprofen, Motrin, Advil) and anti-inflammatories (Meloxicam, Naproxen, Diclofenac, Celebrex) and for cervical injections must hold Multi-Vitamins, Vitamin E, Fish Oil/Omega-3.  Patient informed of [facility]'s  policy:  The patient will require transportation arrangements to and from the procedure, with the  present on site for the entire visit.  Without a , the appointment is subject to cancellation.    [facility] is located in the [facility] [address] [place] [address].   may park in the yellow/purple parking lot.  Patient verbalized understanding and agrees with plan.  Scheduled in Epic: Yes  Scheduled in Surgical Case: Yes  Follow up appointment made: NOV: 5/7/2025 Horacio Sadler MD  Authorization date valid until N/A at N/A.  Patient give contact information for [facility] to request quote for procedure, if patient would like to continue out of pocket, patient will return call for sooner appointment.

## 2025-04-10 NOTE — TELEPHONE ENCOUNTER
Per CMS Guidelines -no authorization is required for Right L5 TFESI under fluoroscopic guidance.  CPT code: 56551       Status: Authorization is not required based on medical necessity however is not a guarantee of payment and may be subject to review once claim is submitted.  This will be #5 in a rolling 12 month period if completed before 5/14/25.  Please discuss with the patient. Thanks

## 2025-04-10 NOTE — TELEPHONE ENCOUNTER
Reviewed with Dr.Couri mccrary to repeat: Right L5 transforaminal epidural steroid injection.    Spoke with patient and notified her of the above. Patient understood.  Patient will be traveling and unavailable 4/23-4/29.

## 2025-04-10 NOTE — TELEPHONE ENCOUNTER
Last office visit: 10/16/24  Next office visit: 5/14/25  Last injection: 11/1/24 -Left C7-T1 Interlaminar epidural steroid injections                         9/17/24- Right L5 transforaminal epidural steroid injection       Per  at last office visit 10/16/24: \"I will do a left C7-T1 ILESI.  If she does well with these, the she might need to have a right L3 TFESI in the future.\"    Spoke with patient who stated she was recently diagnosed with psoriatic arthritis. States she has been trying to make sense of her symptoms.     Per patient the last injection from 9/2024 gave her relief up until a couple weeks ago. Patient states the symptoms are the same as they were prior to the last injection.    Patient is also being tapered down on the Prednisone from rheumatology and is down to 5 mg tablet. Patient thinking this tapering down is also contributing to her back pain returning.

## 2025-04-11 ENCOUNTER — OFFICE VISIT (OUTPATIENT)
Dept: OTOLARYNGOLOGY | Facility: CLINIC | Age: 70
End: 2025-04-11
Payer: MEDICARE

## 2025-04-11 VITALS — HEIGHT: 64 IN | WEIGHT: 158 LBS | BODY MASS INDEX: 26.98 KG/M2

## 2025-04-11 DIAGNOSIS — M26.622 ARTHRALGIA OF LEFT TEMPOROMANDIBULAR JOINT: Primary | ICD-10-CM

## 2025-04-11 DIAGNOSIS — J34.2 NASAL SEPTAL DEVIATION: ICD-10-CM

## 2025-04-11 PROCEDURE — 99203 OFFICE O/P NEW LOW 30 MIN: CPT | Performed by: SPECIALIST

## 2025-04-11 RX ORDER — METHOCARBAMOL 500 MG/1
TABLET, FILM COATED ORAL
COMMUNITY
Start: 2025-04-04

## 2025-04-11 RX ORDER — FAMOTIDINE 20 MG/1
20 TABLET, FILM COATED ORAL NIGHTLY
Qty: 90 TABLET | Refills: 3 | Status: SHIPPED | OUTPATIENT
Start: 2025-04-11

## 2025-04-11 NOTE — TELEPHONE ENCOUNTER
Please review refill failed/no protocol - prescribed by former PCP, recently established care     Requested Prescriptions     Pending Prescriptions Disp Refills    famotidine 20 MG Oral Tab 90 tablet 3     Sig: Take 1 tablet (20 mg total) by mouth nightly.         Recent Visits  Date Type Provider Dept   03/06/25 Office Visit Marina Spencer MD [facility]   02/20/25 Office Visit Marina Spencer MD [facility]   04/29/24 Office Visit Alirio Ching DO [facility]   04/19/24 Office Visit Andrea Burnham MD [facility]   03/14/24 Office Visit Andrea Burnham MD [facility]   Showing recent visits within past 540 days with a meds authorizing provider and meeting all other requirements  Future Appointments  Date Type Provider Dept   05/29/25 Appointment Mairna Spencer MD [facility]   Showing future appointments within next 150 days with a meds authorizing provider and meeting all other requirements    Requested Prescriptions   Pending Prescriptions Disp Refills    famotidine 20 MG Oral Tab 90 tablet 3     Sig: Take 1 tablet (20 mg total) by mouth nightly.       Gastrointestional Medication Protocol Passed - 4/11/2025 12:24 PM        Passed - In person appointment or virtual visit in the past 12 mos or appointment in next 3 mos     Recent Outpatient Visits              1 week ago Urinary symptom or sign    [facility] Rosmery Morfin MD    Office Visit    1 month ago Acquired hypothyroidism    [facility] Marina Spencer MD    Office Visit    1 month ago Chronic migraine w/o aura w/o status migrainosus, not intractable    [facility] Joshua Irving MD    Office Visit    1 month ago Polyarthralgia    [facility] Marina Spencer MD    Office Visit    3 months ago Postop check    [facility]  - OB/GYN Rosmery Morfin MD    Office Visit          Future Appointments         Provider Department Appt Notes    Today Shawna Loweyr MD [facility] Painful parotid gland    In 3 weeks Horacio Sadler MD [facility] 4 M F/U ( New condition Sacroiliac Joint Pain)    In 1 month Trista George, ERIKA [facility] Epic order linked    In 1 month Marina Spencer MD [facility] Psoriatic Arthritis, Hashimoto's Thyroiditis, Weight Gain from Prednisone, Annual Exam    In 2 months Horacio Sadler MD [facility]: Right L5 Transforaminal Epidural Steroid Injection    In 2 months Horacio Sadler MD [facility] F/U discuss Pain meds                    Passed - Medication is active on med list

## 2025-04-12 NOTE — PROGRESS NOTES
Debbie Peterson is a 69 year old female.   Chief Complaint   Patient presents with    New Patient    Throat Problem     Painful parotid gland     HPI:   Patient here with pain in her left facial area.  Would like to have her parotid checked.  Does have a history of autoimmune disease including psoriatic arthritis, Sjogren's, lupus, and Hashimoto's thyroiditis.    Current Medications[1]   Past Medical History[2]   Social History:  Short Social Hx on File[3]     REVIEW OF SYSTEMS:   GENERAL HEALTH: feels well otherwise  GENERAL : denies fever, chills, sweats, weight loss, 25 pound weight gain secondary to hypothyroidism   SKIN: denies any unusual skin lesions or rashes  RESPIRATORY: denies shortness of breath with exertion  NEURO: denies headaches    EXAM:   Ht 5' 4\" (1.626 m)   Wt 158 lb (71.7 kg)   BMI 27.12 kg/m²   System Details   Skin Inspection - Normal.   Constitutional Overall appearance - Normal.   Head/Face Facial features - Normal. Eyebrows - Normal. Skull - Normal.   Eyes Conjunctiva - Right: Normal, Left: Normal. Pupil - Right: Normal, Left: Normal.    Ears Inspection - Right: Normal, Left: Normal.   Canal - Right: Normal, Left: Normal.   TM - Right: Normal, Left: Normal.  No middle ear fluid either ear   Nasal External nose - Normal.   Nasal septum -right septal deviation  Turbinates - Normal.   Oral/Oropharynx Lips - Normal, Tonsils - Normal, Tongue - Normal   Movement of the jaw not smooth.  Worse on the left than the right.   Neck Exam Inspection - Normal. Palpation - Normal. Parotid gland -no pain to palpation of either gland.  Clear fluid through the bilateral parotid ducts.  Thyroid gland - Normal.   Lymph Detail Submental. Submandibular. Anterior cervical. Posterior cervical. Supraclavicular all without enlargement   Psychiatric Orientation - Oriented to time, place, person & situation. Appropriate mood and affect.   Neurological Memory - Normal. Cranial nerves - Cranial nerves II through  XII grossly intact.     ASSESSMENT AND PLAN:   1. Arthralgia of left temporomandibular joint  Patient given a handout for TMJ arthralgia.  Also sent for TMJ therapy.  If this is not helpful, an MRI of the TMJ can be considered.  - Physical Therapy Referral - [facility]    2. Nasal septal deviation  Right septal deviation.      The patient indicates understanding of these issues and agrees to the plan.      Shawna Lowery MD  4/11/2025  9:31 PM         [1]   Current Outpatient Medications   Medication Sig Dispense Refill    famotidine 20 MG Oral Tab Take 1 tablet (20 mg total) by mouth nightly. 90 tablet 3    methocarbamol 500 MG Oral Tab       LORazepam 1 MG Oral Tab Take 1 tablet (1 mg total) by mouth 3 (three) times daily as needed for Anxiety (tremor and upcoming MRIs). 15 tablet 0    Galcanezumab-gnlm (EMGALITY) 120 MG/ML Subcutaneous Solution Auto-injector Inject 120 mg into the skin every 21 days. Every 3rd Wednesday 1 each 11    Rimegepant Sulfate (NURTEC) 75 MG Oral Tablet Dispersible Take 75 mg by mouth as needed. Take one tablet at onset of migraine.  Maximum dose in 24 hours is 1 tablet (75mg). 8 tablet 11    SUMAtriptan Succinate (IMITREX) 100 MG Oral Tab Take 1 tablet (100 mg total) by mouth every 2 (two) hours as needed for Migraine. Use at onset; repeat once after 2 HRS-ONLY 2 doses of any sumatriptan (nasal, oral, or injectable) IN 24 HR MAX. 27 tablet 3    RHOFADE 1 % External Cream Apply 1 Application topically every morning.      Folic Acid (FOLATE OR) Take by mouth.      loperamide 2 MG Oral Cap Take 1 capsule (2 mg total) by mouth 4 (four) times daily as needed for Diarrhea. 90 capsule 0    LORazepam 1 MG Oral Tab Take 1 tablet (1 mg total) by mouth 3 (three) times daily. 90 tablet 3    ondansetron (ZOFRAN) 4 mg tablet Take 1 tablet (4 mg total) by mouth every 8 (eight) hours as needed for Nausea. 30 tablet 3    Fluocinonide 0.05 % External Solution Apply 1 Application  topically daily.  60 mL 0    azelastine 0.1 % Nasal Solution 2 sprays by Nasal route 2 (two) times daily. 90 mL 1    betamethasone dipropionate 0.05 % External Lotion Apply 2 mL topically daily. To scalp 180 mL 0    ipratropium 0.06 % Nasal Solution 2 sprays by Nasal route 4 (four) times daily. 42 mL 3    fluticasone-umeclidin-vilant (TRELEGY ELLIPTA) 100-62.5-25 MCG/ACT Inhalation Aerosol Powder, Breath Activated Inhale 1 puff into the lungs daily.      Ivermectin 1 % External Cream APPLY THIN LAYER TOPICALLY TO FACE EVERY DAY      methotrexate 2.5 MG Oral Tab Take 3 tablets (7.5 mg total) by mouth once a week.      valACYclovir 500 MG Oral Tab Take 1 tablet (500 mg total) by mouth daily.      mometasone furoate 50 MCG/ACT Nasal Suspension 1 spray by Nasal route daily. 51 g 2    Azelaic Acid (FINACEA) 15 % External Gel Apply topically daily. 1 each 0    cholecalciferol 50 MCG (2000 UT) Oral Cap Take 1 capsule (2,000 Units total) by mouth daily. Take 2 tablets daily 90 capsule 0    predniSONE 5 MG Oral Tab Take 3 tablets (15 mg total) by mouth every morning.      Apremilast (OTEZLA) 10 & 20 & 30 MG Oral Tablet Therapy Pack  (Patient not taking: Reported on 4/3/2025)      predniSONE 10 MG Oral Tab 0.5 tablets (5 mg total).      levothyroxine 150 MCG Oral Tab Take 1 tablet (150 mcg total) by mouth before breakfast. (Patient taking differently: Take 137 mcg by mouth before breakfast.) 90 tablet 3    Ibuprofen (MOTRIN OR) Take 200 mg by mouth. (Patient not taking: Reported on 2025)     [2]   Past Medical History:   Arthritis    Autoimmune disorder (HCC)    Lupus    Cervical disc displacement    C4-C5, C5-C6 disc displacement    Chronic pain    Right foot after 2020 foot surgery    Depression    Daughter  (adopted)    Difficulty urinating    Hearing impaired person, bilateral    Hiatal hernia with gastroesophageal reflux    Hypothyroidism    Infertility    Interstitial cystitis    Long COVID    Lupus    Migraine    Migraines     Peripheral neuropathy    PONV (postoperative nausea and vomiting)    Reactive depression (situational)    when daughter     Scalp psoriasis    SI (sacroiliac) joint dysfunction    Sjogren's disease (HCC)    Traumatic brain injury (HCC)    Tremor    Trigeminal neuralgia pain    Vaginal stricture    Vasculitis    nose and sinus    Vertigo   [3]   Social History  Socioeconomic History    Marital status: Single    Number of children: 0   Occupational History    Occupation: psychiatrist     Comment: retired   Tobacco Use    Smoking status: Never    Smokeless tobacco: Never   Vaping Use    Vaping status: Never Used   Substance and Sexual Activity    Alcohol use: Not Currently    Drug use: Never    Sexual activity: Not Currently   Other Topics Concern    Blood Transfusions No    Caffeine Concern Yes     Comment: tea, 1 cup    Exercise No   Social History Narrative    Lives alone    Feels safe    No hx of abuse     Social Drivers of Health     Food Insecurity: No Food Insecurity (2022)    Received from [facility]    Food Insecurity     Currently or in the past 3 months, have you worried your food would run out before you had money to buy more?: No     In the past 12 months, have you run out of food or been unable to get more?: No   Transportation Needs: Unmet Transportation Needs (2022)    Received from [facility]    Transportation Needs     Medical Transportation Needs?: Yes     Daily Living Transportation Needs? [Peds Only] : Yes    Received from [facility]    Housing Stability

## 2025-04-12 NOTE — PATIENT INSTRUCTIONS
The movement of your mandible was not smooth.  This was worse on the left than the right.  You were given a handout for TMJ arthralgia.Heat, soft diet (only foods you can cut with a fork), aspirin, advil, aleve, or motrin.  No gum chewing.  Consider a dental splint if grinding.   I also ordered physical therapy.  No evidence of abnormality of your parotid glands.  You do have an incidental right septal deviation.

## 2025-04-14 ENCOUNTER — TELEPHONE (OUTPATIENT)
Dept: NEUROLOGY | Facility: CLINIC | Age: 70
End: 2025-04-14

## 2025-04-14 NOTE — TELEPHONE ENCOUNTER
Rcvd fax from [facility]. PA is about to  for Emgality 120mg/ml auto-injectors. To be completed via CM. KEY : [id number]

## 2025-04-21 ENCOUNTER — PATIENT MESSAGE (OUTPATIENT)
Dept: OBGYN CLINIC | Facility: CLINIC | Age: 70
End: 2025-04-21

## 2025-04-21 ENCOUNTER — TELEPHONE (OUTPATIENT)
Dept: NEUROLOGY | Facility: CLINIC | Age: 70
End: 2025-04-21

## 2025-04-22 RX ORDER — ESTRADIOL AND NORETHINDRONE ACETATE .5; .1 MG/1; MG/1
1 TABLET ORAL DAILY
Qty: 84 TABLET | Refills: 3 | Status: SHIPPED | OUTPATIENT
Start: 2025-04-22

## 2025-04-22 RX ORDER — ESTRADIOL AND NORETHINDRONE ACETATE .5; .1 MG/1; MG/1
1 TABLET ORAL DAILY
Qty: 84 TABLET | Refills: 3 | OUTPATIENT
Start: 2025-04-22

## 2025-04-29 DIAGNOSIS — F40.240 CLAUSTROPHOBIA: ICD-10-CM

## 2025-04-29 DIAGNOSIS — F41.9 ANXIETY: ICD-10-CM

## 2025-04-29 DIAGNOSIS — G25.0 ESSENTIAL TREMOR: ICD-10-CM

## 2025-04-29 RX ORDER — IPRATROPIUM BROMIDE 42 UG/1
2 SPRAY, METERED NASAL 4 TIMES DAILY
Qty: 42 ML | Refills: 0 | Status: SHIPPED | OUTPATIENT
Start: 2025-04-29

## 2025-04-29 RX ORDER — LORAZEPAM 1 MG/1
1 TABLET ORAL 3 TIMES DAILY PRN
Qty: 90 TABLET | Refills: 0 | Status: SHIPPED | OUTPATIENT
Start: 2025-04-29

## 2025-04-29 RX ORDER — LORAZEPAM 1 MG/1
2 TABLET ORAL NIGHTLY
Qty: 20 TABLET | Refills: 0 | Status: CANCELLED | OUTPATIENT
Start: 2025-04-29

## 2025-04-29 RX ORDER — VALACYCLOVIR HYDROCHLORIDE 500 MG/1
500 TABLET, FILM COATED ORAL DAILY
Qty: 10 TABLET | Refills: 0 | Status: SHIPPED | OUTPATIENT
Start: 2025-04-29

## 2025-04-29 RX ORDER — AZELASTINE 1 MG/ML
2 SPRAY, METERED NASAL 2 TIMES DAILY
Qty: 90 ML | Refills: 0 | Status: SHIPPED | OUTPATIENT
Start: 2025-04-29

## 2025-04-29 NOTE — TELEPHONE ENCOUNTER
Patient called because she has the flu and is in [place]. She had to cancel her flight home and is in need of short term supply of medications  Patient states she takes the lorazepam 1mg pills 2 pills at bedtime. She needs azestaline nasal drops, valacyclovir, and ipratropium nasal drops as well.     Patient states [facility] was able to fill the other ones because she still had refills available.     Patient asking for pended meds to be sent to [facility] [address] [place] [address]

## 2025-04-30 ENCOUNTER — TELEPHONE (OUTPATIENT)
Dept: OTOLARYNGOLOGY | Facility: CLINIC | Age: 70
End: 2025-04-30

## 2025-04-30 RX ORDER — MOMETASONE FUROATE MONOHYDRATE 50 UG/1
1 SPRAY, METERED NASAL DAILY
Qty: 51 G | Refills: 2 | Status: SHIPPED | OUTPATIENT
Start: 2025-04-30

## 2025-04-30 RX ORDER — FAMOTIDINE 20 MG/1
20 TABLET, FILM COATED ORAL NIGHTLY
Qty: 10 TABLET | Refills: 0 | Status: SHIPPED | OUTPATIENT
Start: 2025-04-30

## 2025-04-30 RX ORDER — FOLIC ACID 1 MG/1
1 TABLET ORAL DAILY
Qty: 10 TABLET | Refills: 0 | Status: SHIPPED | OUTPATIENT
Start: 2025-04-30

## 2025-04-30 RX ORDER — BENZONATATE 200 MG/1
200 CAPSULE ORAL 3 TIMES DAILY PRN
Qty: 21 CAPSULE | Refills: 0 | Status: SHIPPED | OUTPATIENT
Start: 2025-04-30

## 2025-04-30 RX ORDER — LEVOTHYROXINE SODIUM 137 UG/1
137 TABLET ORAL
Qty: 10 TABLET | Refills: 0 | Status: SHIPPED | OUTPATIENT
Start: 2025-04-30

## 2025-04-30 RX ORDER — ESTRADIOL AND NORETHINDRONE ACETATE .5; .1 MG/1; MG/1
1 TABLET ORAL DAILY
Qty: 84 TABLET | Refills: 0 | Status: SHIPPED | OUTPATIENT
Start: 2025-04-30

## 2025-04-30 NOTE — TELEPHONE ENCOUNTER
Patient called back and was very tearful, stating she still needed emergency refills for Levothyroxine 137 mcg, Famotidine 20 mg, Estradiol-norethindrone 0.5-.0.1 mg , famotidine 20 mg, mometasone furoate 50 mcg/act nasal suspension, and benzonatate. She was not able to fill these medications at [facility].     Patient will be in [place] for another week, due to her having the flu and having to cancel her flight. Patient has sent my chart messages and called yesterday. Yesterday, spoke to her and she stated she only needed 4 medications.     Patient states the gynecologist usually prescribes the Estradiol-norethindrone, but is really in need,has started bleeding because she missed a dose.     Patient states she has a dry cough and usually has benzonatate prescribed to help with her cough.      is not in the office today.

## 2025-04-30 NOTE — TELEPHONE ENCOUNTER
Patient stating she's experiencing congestion and she's in [place] right now she had to cancel her flight back home, patient would like to know if Dr Lowery can prescribed a medication that is called Benzonatate for cough.      Departments: [facility]   Address: [address] [place] [address], [place] [address]  Phone: [phone]

## 2025-05-14 ENCOUNTER — NURSE TRIAGE (OUTPATIENT)
Facility: CLINIC | Age: 70
End: 2025-05-14

## 2025-05-14 NOTE — TELEPHONE ENCOUNTER
Action Requested: Summary for Provider     []  Critical Lab, Recommendations Needed  [] Need Additional Advice  []   FYI    []   Need Orders  [] Need Medications Sent to Pharmacy  []  Other     SUMMARY: Per Protocol disposition advised to be seen in the office for ongoing sinus congestion with cough.      Assisted patient with appt scheduling, verbalized understanding and agrees to plan.   Future Appointments   Date Time Provider Department Center   5/15/2025  9:00 AM Alirio Ching DO [facility]   2025 10:30 AM Marina Spencer MD [facility]   2025  7:30 AM Horacio Sadler MD [facility]   2025  2:00 PM Horacio Sadler MD [facility]       Reason for call: Acute  Onset: over 2 weeks    Patient (name and  verified) calling with continued symptoms of sinus congestion, cough for over 2 weeks. Patient was seen in the urgent care in [place] on .   Reason for Disposition   Patient wants to be seen    Protocols used: Sinus Pain and Congestion-A-OH

## 2025-05-15 ENCOUNTER — OFFICE VISIT (OUTPATIENT)
Facility: CLINIC | Age: 70
End: 2025-05-15
Payer: MEDICARE

## 2025-05-15 VITALS
HEIGHT: 64 IN | BODY MASS INDEX: 25.95 KG/M2 | HEART RATE: 83 BPM | DIASTOLIC BLOOD PRESSURE: 70 MMHG | OXYGEN SATURATION: 98 % | SYSTOLIC BLOOD PRESSURE: 108 MMHG | WEIGHT: 152 LBS

## 2025-05-15 DIAGNOSIS — J01.40 ACUTE NON-RECURRENT PANSINUSITIS: Primary | ICD-10-CM

## 2025-05-15 PROCEDURE — 87637 SARSCOV2&INF A&B&RSV AMP PRB: CPT | Performed by: FAMILY MEDICINE

## 2025-05-15 PROCEDURE — 99213 OFFICE O/P EST LOW 20 MIN: CPT | Performed by: FAMILY MEDICINE

## 2025-05-15 RX ORDER — LEVOTHYROXINE SODIUM 125 UG/1
TABLET ORAL
COMMUNITY
Start: 2025-04-21

## 2025-05-15 NOTE — PROGRESS NOTES
HPI:    Patient ID: Debbie Peterson is a 69 year old female who presents for sinus congestion.    HPI  Was in [place] recently and started to feel sick on 4/28/25.  Sx include chills, fever, dry heaves, myalgias, cough, mucous.   Went to  in [place] and was dx with viral URI. No testing completed. Was provided rx for doxycyline (100mg BID x5 days) to start if fevers recurred which they haven't. Never started rx.   Still very congested in sinuses & ears.   Having HA.   Used afrin and now her normal nose sprays.   Would like nasal swab.    Past Medical History[1]     Current Medications[2]     Allergies[3]    Review of Systems   See HPI. Otherwise negative.         /70   Pulse 83   Ht 5' 4\" (1.626 m)   Wt 152 lb (68.9 kg)   SpO2 98%   BMI 26.09 kg/m²     PHYSICAL EXAM:   Physical Exam  Constitutional:       General: She is not in acute distress.     Appearance: Normal appearance. She is well-developed. She is not ill-appearing, toxic-appearing or diaphoretic.   HENT:      Head: Normocephalic and atraumatic.      Right Ear: Tympanic membrane, ear canal and external ear normal.      Left Ear: Tympanic membrane, ear canal and external ear normal.      Nose: Congestion present.      Mouth/Throat:      Mouth: Mucous membranes are moist.      Pharynx: Oropharynx is clear.   Eyes:      Extraocular Movements: Extraocular movements intact.      Conjunctiva/sclera: Conjunctivae normal.   Cardiovascular:      Rate and Rhythm: Normal rate and regular rhythm.      Pulses: Normal pulses.      Heart sounds: Normal heart sounds. No murmur heard.     No friction rub. No gallop.   Pulmonary:      Effort: Pulmonary effort is normal. No respiratory distress.      Breath sounds: Normal breath sounds. No wheezing or rales.   Musculoskeletal:      Cervical back: Neck supple.      Right lower leg: No edema.      Left lower leg: No edema.   Lymphadenopathy:      Cervical: No cervical adenopathy.   Skin:     General: Skin is  warm and dry.      Capillary Refill: Capillary refill takes less than 2 seconds.   Neurological:      General: No focal deficit present.      Mental Status: She is alert.   Psychiatric:         Mood and Affect: Mood normal.         Behavior: Behavior normal.         Thought Content: Thought content normal.         Judgment: Judgment normal.             ASSESSMENT/PLAN:     Encounter Diagnosis   Name Primary?    Acute non-recurrent pansinusitis Yes       1. Acute non-recurrent pansinusitis  - SARS-CoV-2/Flu A and B/RSV by PCR (J [facility]) [E] *Collect in Office!; Future     -Treat for bacterial sinusitis.   -She had explosive diarrhea with augmentin last year, thus will treat with doxycyline instead. She has rx for 100mg BID x5 days and will start today. To call us next week if symptoms are not significantly improved and will then extend the course for another 2-5 days.  -Continue supportive care otherwise.   -Nasal swab sent today per request.    Meds This Visit:  Requested Prescriptions      No prescriptions requested or ordered in this encounter       Imaging & Referrals:  None       Alirio Ching DO  ID#[id number]       [1]   Past Medical History:   Arthritis    Autoimmune disorder (HCC)    Lupus    Cervical disc displacement    C4-C5, C5-C6 disc displacement    Chronic pain    Right foot after 2020 foot surgery    Depression    Daughter  (adopted)    Difficulty urinating    Hearing impaired person, bilateral    Hiatal hernia with gastroesophageal reflux    Hypothyroidism    Infertility    Interstitial cystitis    Long COVID    Lupus    Migraine    Migraines    Peripheral neuropathy    PONV (postoperative nausea and vomiting)    Reactive depression (situational)    when daughter     Scalp psoriasis    SI (sacroiliac) joint dysfunction    Sjogren's disease (HCC)    Traumatic brain injury (HCC)    Tremor    Trigeminal neuralgia pain    Vaginal stricture    Vasculitis    nose and sinus    Vertigo   [2]   Current  Outpatient Medications   Medication Sig Dispense Refill    levothyroxine 125 MCG Oral Tab       mometasone furoate 50 MCG/ACT Nasal Suspension 1 spray by Nasal route daily. 51 g 2    levothyroxine 137 MCG Oral Tab Take 137 mcg by mouth before breakfast. 10 tablet 0    folic acid 1 MG Oral Tab Take 1 tablet (1 mg total) by mouth daily. 10 tablet 0    Estradiol-Norethindrone Acet 0.5-0.1 MG Oral Tab Take 1 tablet by mouth daily. 84 tablet 0    famotidine 20 MG Oral Tab Take 1 tablet (20 mg total) by mouth nightly. 10 tablet 0    benzonatate 200 MG Oral Cap Take 1 capsule (200 mg total) by mouth 3 (three) times daily as needed for cough. 21 capsule 0    valACYclovir 500 MG Oral Tab Take 1 tablet (500 mg total) by mouth daily. 10 tablet 0    ipratropium 0.06 % Nasal Solution 2 sprays by Nasal route 4 (four) times daily. 42 mL 0    azelastine 0.1 % Nasal Solution 2 sprays by Nasal route 2 (two) times daily. 90 mL 0    LORazepam 1 MG Oral Tab Take 1 tablet (1 mg total) by mouth 3 (three) times daily as needed for Anxiety (insomnia). 90 tablet 0    methocarbamol 500 MG Oral Tab       predniSONE 5 MG Oral Tab Take 3 tablets (15 mg total) by mouth every morning.      Apremilast (OTEZLA) 10 & 20 & 30 MG Oral Tablet Therapy Pack       Galcanezumab-gnlm (EMGALITY) 120 MG/ML Subcutaneous Solution Auto-injector Inject 120 mg into the skin every 21 days. Every 3rd Wednesday 1 each 11    Rimegepant Sulfate (NURTEC) 75 MG Oral Tablet Dispersible Take 75 mg by mouth as needed. Take one tablet at onset of migraine.  Maximum dose in 24 hours is 1 tablet (75mg). 8 tablet 11    SUMAtriptan Succinate (IMITREX) 100 MG Oral Tab Take 1 tablet (100 mg total) by mouth every 2 (two) hours as needed for Migraine. Use at onset; repeat once after 2 HRS-ONLY 2 doses of any sumatriptan (nasal, oral, or injectable) IN 24 HR MAX. 27 tablet 3    predniSONE 10 MG Oral Tab 0.5 tablets (5 mg total).      RHOFADE 1 % External Cream Apply 1 Application  topically every morning.      Folic Acid (FOLATE OR) Take by mouth.      loperamide 2 MG Oral Cap Take 1 capsule (2 mg total) by mouth 4 (four) times daily as needed for Diarrhea. 90 capsule 0    ondansetron (ZOFRAN) 4 mg tablet Take 1 tablet (4 mg total) by mouth every 8 (eight) hours as needed for Nausea. 30 tablet 3    Fluocinonide 0.05 % External Solution Apply 1 Application  topically daily. 60 mL 0    betamethasone dipropionate 0.05 % External Lotion Apply 2 mL topically daily. To scalp 180 mL 0    fluticasone-umeclidin-vilant (TRELEGY ELLIPTA) 100-62.5-25 MCG/ACT Inhalation Aerosol Powder, Breath Activated Inhale 1 puff into the lungs daily.      Ivermectin 1 % External Cream APPLY THIN LAYER TOPICALLY TO FACE EVERY DAY      methotrexate 2.5 MG Oral Tab Take 3 tablets (7.5 mg total) by mouth once a week.      Ibuprofen (MOTRIN OR) Take 200 mg by mouth.      Azelaic Acid (FINACEA) 15 % External Gel Apply topically daily. 1 each 0    cholecalciferol 50 MCG (2000 UT) Oral Cap Take 1 capsule (2,000 Units total) by mouth daily. Take 2 tablets daily 90 capsule 0   [3]   Allergies  Allergen Reactions    Cyclobenzaprine OTHER (SEE COMMENTS)     Drowsy, nightmares    Gabapentin OTHER (SEE COMMENTS)     Upper and lower extremity ataxia and cognitive impairment     Hydroxychloroquine OTHER (SEE COMMENTS)     eye toxicity    Meloxicam PALPITATIONS, SHORTNESS OF BREATH and TINITUS    Ropinirole OTHER (SEE COMMENTS)     Vertigo     Propranolol PAIN     Can precipitate autoimmune flares    Naproxen OTHER (SEE COMMENTS)    Sulfa Antibiotics HIVES    Trimethoprim HIVES    Biaxin [Clarithromycin] ITCHING    Nsaids TINITUS    Sulfamethoxazole W/Trimethoprim ITCHING

## 2025-05-16 ENCOUNTER — NURSE TRIAGE (OUTPATIENT)
Facility: CLINIC | Age: 70
End: 2025-05-16

## 2025-05-16 LAB
FLUAV + FLUBV RNA SPEC NAA+PROBE: NOT DETECTED
FLUAV + FLUBV RNA SPEC NAA+PROBE: NOT DETECTED
RSV RNA SPEC NAA+PROBE: NOT DETECTED
SARS-COV-2 RNA RESP QL NAA+PROBE: NOT DETECTED

## 2025-05-16 NOTE — TELEPHONE ENCOUNTER
Called and spoke with patient and identified full name and date of birth.  Relayed Dr. Ching message and patient voiced understanding with treatment plan

## 2025-05-16 NOTE — TELEPHONE ENCOUNTER
Action Requested: Summary for Provider     []  Critical Lab, Recommendations Needed  [x] Need Additional Advice  []   FYI    []   Need Orders  [] Need Medications Sent to Pharmacy  []  Other     SUMMARY: Please advise. Patient states she was seen in the office yesterday by . Patient states she forgot to mention she started having  neck pain 3 days ago. She is also very fatigued and is having sweats. Patient can touch her chin to her chest but it is painful, has had headache for 3 weeks. Patient states she keeps her room dark, so unsure if sensitivity to light. No fever    Patient states she started having flu like symptoms about 3 weeks ago. In the beginning had nausea then cough and sinus infection. She started taking the doxycycline    Reason for call: Neck Pain  Onset: 3 days ago  Reason for Disposition   Age > 50 and no prior history of similar neck pain    Protocols used: Neck Pain or [id number]

## 2025-05-16 NOTE — TELEPHONE ENCOUNTER
I would still recommend treatment as discussed yesterday. Recommend she call office next week if no improvement and may consider additional w/u. To go to ED over the weekend for new/worsening symptoms.

## 2025-05-19 ENCOUNTER — NURSE TRIAGE (OUTPATIENT)
Facility: CLINIC | Age: 70
End: 2025-05-19

## 2025-05-19 RX ORDER — DOXYCYCLINE 100 MG/1
100 CAPSULE ORAL 2 TIMES DAILY
Qty: 10 CAPSULE | Refills: 0 | Status: SHIPPED | OUTPATIENT
Start: 2025-05-19 | End: 2025-05-24

## 2025-05-19 NOTE — TELEPHONE ENCOUNTER
DR Ching ==patient is requesting for the additional antibiotic, thanks.       Patient reported that she is improving, but still  sinus is very  congested and cough, neck pain, she is not well yet .RN heard frequent clearing throat . No fever, but she sweats every morning . She is using 3 kinds of nasal inhaler .   Today is the last day of the antibiotic Doxycyline.   RN instructed the patient to push fluids, increase Vitamin C intake, tylenol /ibuprofen for pain and fever .         LAST VISIT 5/15/25;  1. Acute non-recurrent pansinusitis  - SARS-CoV-2/Flu A and B/RSV by PCR ([facility]) [E] *Collect in Office!; Future     -Treat for bacterial sinusitis.   -She had explosive diarrhea with augmentin last year, thus will treat with doxycyline instead. She has rx for 100mg BID x5 days and will start today. To call us next week if symptoms are not significantly improved and will then extend the course for another 2-5 days.  -Continue supportive care otherwise.   -Nasal swab sent today per request.

## 2025-05-19 NOTE — TELEPHONE ENCOUNTER
Okay. Let's extend the abx for an additional 5 days to complete a full 10-day course. Rx sent. Please let pt know. Thank you.   4

## 2025-05-19 NOTE — TELEPHONE ENCOUNTER
Spoke to patient, full name and date of birth verified.  Informed of additional prescription sent by Dr. Ching.   RN encouraged patient to call us if still not improved, so we can get her a follow up visit.     Patient verbalized understanding and agreement with plan of care.

## 2025-06-12 ENCOUNTER — NURSE TRIAGE (OUTPATIENT)
Facility: CLINIC | Age: 70
End: 2025-06-12

## 2025-06-12 ENCOUNTER — HOSPITAL ENCOUNTER (OUTPATIENT)
Age: 70
Discharge: HOME | End: 2025-06-12
Payer: MEDICARE

## 2025-06-12 VITALS
SYSTOLIC BLOOD PRESSURE: 140 MMHG | TEMPERATURE: 99 F | DIASTOLIC BLOOD PRESSURE: 90 MMHG | RESPIRATION RATE: 20 BRPM | OXYGEN SATURATION: 100 % | HEART RATE: 85 BPM

## 2025-06-12 DIAGNOSIS — J32.1 CHRONIC FRONTAL SINUSITIS: Primary | ICD-10-CM

## 2025-06-12 DIAGNOSIS — T14.8XXA BRUISING: ICD-10-CM

## 2025-06-12 LAB
#MXD IC: 0.3 X10ˆ3/UL (ref 0.1–1)
HCT VFR BLD AUTO: 41 % (ref 35–48)
HGB BLD-MCNC: 13.4 G/DL (ref 12–16)
LYMPHOCYTES # BLD AUTO: 1.9 X10ˆ3/UL (ref 1–4)
LYMPHOCYTES NFR BLD AUTO: 38.7 %
MCH RBC QN AUTO: 32.6 PG (ref 26–34)
MCHC RBC AUTO-ENTMCNC: 32.7 G/DL (ref 31–37)
MCV RBC AUTO: 99.8 FL (ref 80–100)
MIXED CELL %: 6.5 %
NEUTROPHILS # BLD AUTO: 2.8 X10ˆ3/UL (ref 1.5–7.7)
NEUTROPHILS NFR BLD AUTO: 54.8 %
PLATELET # BLD AUTO: 189 X10ˆ3/UL (ref 150–450)
RBC # BLD AUTO: 4.11 X10ˆ6/UL (ref 3.8–5.3)
WBC # BLD AUTO: 5 X10ˆ3/UL (ref 4–11)

## 2025-06-12 PROCEDURE — 99213 OFFICE O/P EST LOW 20 MIN: CPT | Performed by: PHYSICIAN ASSISTANT

## 2025-06-12 PROCEDURE — 85025 COMPLETE CBC W/AUTO DIFF WBC: CPT | Performed by: PHYSICIAN ASSISTANT

## 2025-06-12 NOTE — ED INITIAL ASSESSMENT (HPI)
Pt presents with right lower leg bruising, no known trauma or injury. Symptoms x 3 days.     Pt reports sinus congestion x 1.5 months. Pt treated with Doxycycline x 2 consecutive prescriptions.     Pt reports \"sinus pressure has not ever completely cleared\", per pt.

## 2025-06-12 NOTE — TELEPHONE ENCOUNTER
Action Requested: Summary for Provider     []  Critical Lab, Recommendations Needed  [] Need Additional Advice  []   FYI    []   Need Orders  [] Need Medications Sent to Pharmacy  []  Other     SUMMARY: Due to no available appointments until 6/16/25, patient will proceed to Immediate Care for evaluation today.    Reason for call: Ear Problem  Onset: May 1, 2025    Spoke to patient, full name and date of birth verified.  Patient reports bad sinus infection > 1 month.   Patient seen while in [place] at an Immediate Care in early May.    Followed up with Dr. Ching in office 5/15/25.   Patient completed doxycycline x 5 days, then another 5 days.     Patient never fully recovered.   Patient reports fullness/congestion in both ears, alternates sides and wakes up in the morning with sweats (denies fever).    1st appointment at [facility] is not until Monday 6/16/25, RN recommended [facility].     Patient verbalized understanding and agreement with plan of care.     Reason for Disposition   Patient wants to be seen    Protocols used: Ear - Congestion-A-OH

## 2025-06-12 NOTE — ED PROVIDER NOTES
Patient Seen in: [facility]        History  Chief Complaint   Patient presents with    Sinus Problem    Bruising     Stated Complaint: sinus infecton, foot infection    Subjective:   HPI            Debbie Peterson is a 69 year old female presents with chronic sinus congestion,  rhinorrhea and sinus pressure for over 6 weeks.   Patient was prescribed 2 separate 5-day courses of doxycycline shortly after onset of symptoms.  Additionally patient reports sporadic bruising that is atraumatic in nature to right lower extremity that was noticed over 3 days prior to arrival patient denies cough,dysphagia, throat pain, ear pain,  fevers, chills, shortness of breath, respiratory distress, stridor, neck pain/ stiffness, headache, eye pain/ redness, facial/ lip/ eyelid swelling. No medications taken prior to arrival. No alleviating/ aggravating factors. Patient is NOT concerned about COVID 19 infection at this encounter.  Patient is  immunized for COVID 19.          Objective:     Past Medical History:    Arthritis    Autoimmune disorder (HCC)    Lupus    Cervical disc displacement    C4-C5, C5-C6 disc displacement    Chronic pain    Right foot after 2020 foot surgery    Depression    Daughter  (adopted)    Difficulty urinating    Hearing impaired person, bilateral    Hiatal hernia with gastroesophageal reflux    Hypothyroidism    Infertility    Interstitial cystitis    Long COVID    Lupus    Migraine    Migraines    Peripheral neuropathy    PONV (postoperative nausea and vomiting)    Reactive depression (situational)    when daughter     Scalp psoriasis    SI (sacroiliac) joint dysfunction    Sjogren's disease (HCC)    Traumatic brain injury (HCC)    Tremor    Trigeminal neuralgia pain    Vaginal stricture    Vasculitis    nose and sinus    Vertigo              Past Surgical History:   Procedure Laterality Date    Arthroscopy, shoulder, surgical; w/rotator cuff repair Right     subclavian  reconstruction      Back surgery  2023    Biopsy of skin lesion  09/2020    Cataract extraction Bilateral 2021    Colonoscopy  2012    Correct bunion,simple Right 2004    Egd  2012    Foot fracture surgery Right 2020    Hysteroscopy,diagnostic  11/07/2024    endometrial polypectomy    Other surgical history  2005    heel: sural nerve neuroma excision -- achilles tendon accidentally injured during surgery                Social History     Socioeconomic History    Marital status: Single    Number of children: 0   Occupational History    Occupation: psychiatrist     Comment: retired   Tobacco Use    Smoking status: Never    Smokeless tobacco: Never   Vaping Use    Vaping status: Never Used   Substance and Sexual Activity    Alcohol use: Not Currently    Drug use: Never    Sexual activity: Not Currently   Other Topics Concern    Blood Transfusions No    Caffeine Concern Yes     Comment: tea, 1 cup    Exercise No   Social History Narrative    Lives alone    Feels safe    No hx of abuse     Social Drivers of Health     Food Insecurity: No Food Insecurity (7/6/2022)    Received from [facility]    Food Insecurity     Currently or in the past 3 months, have you worried your food would run out before you had money to buy more?: No     In the past 12 months, have you run out of food or been unable to get more?: No   Transportation Needs: Unmet Transportation Needs (7/6/2022)    Received from [facility]    Transportation Needs     Currently or in the past 3 months, has lack of transportation kept you from medical appointments, getting food or medicine, or providing care to a family member?: Yes     Has the lack of transportation kept you from meetings, work, or from getting things needed for daily living?: Yes     Medical Transportation Needs?: Yes     Daily Living Transportation Needs? [Peds Only] : Yes    Received from [facility]    Housing Stability               Review of Systems   All other systems reviewed and are negative.      Positive for stated complaint: sinus infecton, foot infection  Other systems are as noted in HPI.  Constitutional and vital signs reviewed.      All other systems reviewed and negative except as noted above.                  Physical Exam    ED Triage Vitals [06/12/25 1144]   /90   Pulse 85   Resp 20   Temp 98.8 °F (37.1 °C)   Temp src Oral   SpO2 100 %   O2 Device None (Room air)       Current Vitals:   Vital Signs  BP: 140/90  Pulse: 85  Resp: 20  Temp: 98.8 °F (37.1 °C)  Temp src: Oral    Oxygen Therapy  SpO2: 100 %  O2 Device: None (Room air)            Physical Exam  Vitals and nursing note reviewed.   Constitutional:       General: She is not in acute distress.     Appearance: Normal appearance. She is normal weight. She is not ill-appearing, toxic-appearing or diaphoretic.   HENT:      Head: Normocephalic and atraumatic.      Right Ear: Tympanic membrane, ear canal and external ear normal.      Left Ear: Tympanic membrane, ear canal and external ear normal.      Nose: Congestion present. No rhinorrhea.      Mouth/Throat:      Mouth: Mucous membranes are moist.      Pharynx: Oropharynx is clear. No oropharyngeal exudate or posterior oropharyngeal erythema.   Eyes:      Extraocular Movements: Extraocular movements intact.      Conjunctiva/sclera: Conjunctivae normal.      Pupils: Pupils are equal, round, and reactive to light.   Pulmonary:      Effort: Pulmonary effort is normal. No respiratory distress.      Breath sounds: No stridor. No wheezing, rhonchi or rales.   Chest:      Chest wall: No tenderness.   Musculoskeletal:         General: No swelling, tenderness, deformity or signs of injury. Normal range of motion.      Cervical back: Normal range of motion and neck supple.   Skin:     General: Skin is warm and dry.      Capillary Refill: Capillary refill takes less than 2 seconds.      Coloration: Skin is not jaundiced or  pale.      Findings: Bruising present. No erythema, lesion or rash.      Comments: Approximately 3 areas of faint ecchymosis noted to right lower extremity otherwise negative Stew's sign,capillary refill less than 2 seconds, DP/ PT pulses intact 2+ bilaterally      Neurological:      General: No focal deficit present.      Mental Status: She is alert and oriented to person, place, and time. Mental status is at baseline.   Psychiatric:         Mood and Affect: Mood normal.         Behavior: Behavior normal.                 ED Course  Labs Reviewed   POCT CBC                            MDM            Medical Decision Making  69-year-old well-appearing female presents with chronic sinusitis that started over 6 weeks prior to arrival and unrelated nonspecific bruising to the right lower extremity.  No evidence of joint swelling, warmth, tenderness, unilateral calf swelling, calf tenderness.  Plan  - CBC  - SpO2 100% on room air  - reassess  - DC to home   - rx: augmentin 875mg PO bid x 7 days.   - refer to PCP for further evaluation    - return to ED        Amount and/or Complexity of Data Reviewed  Labs: ordered. Decision-making details documented in ED Course.        Disposition and Plan     Clinical Impression:  1. Chronic frontal sinusitis    2. Bruising         Disposition:  Discharge  6/12/2025 12:15 pm    Follow-up:  Marina Spencer MD  [address] [address]  [phone]          [facility] [address] [address]  [phone]              Medications Prescribed:  Discharge Medication List as of 6/12/2025 12:22 PM        START taking these medications    Details   amoxicillin clavulanate 875-125 MG Oral Tab Take 1 tablet by mouth 2 (two) times daily for 7 days., Normal, Disp-14 tablet, R-0                   Supplementary Documentation:

## 2025-06-23 ENCOUNTER — TELEPHONE (OUTPATIENT)
Dept: PHYSICAL MEDICINE AND REHAB | Facility: CLINIC | Age: 70
End: 2025-06-23

## 2025-06-23 NOTE — TELEPHONE ENCOUNTER
Condition update after Procedure.    - Patient had Right L5 Transforaminal Epidural Steroid Injection on 6/20/25 with Dr. Sadler. Patient states she could not stand on R leg right after procedure - it would collapse in the recovery room. Right leg was shaking later in the day. States she is having the same pain as before the procedure, however it is much more intense. Pain is worse at night. Pt states she never experienced this with other injections.     Patient reports she is not having the weakness and shaking anymore, however she has limited her physical activity. Painful now with walking and bearing weight on right side.     If pain is worse:  - Pain level prior to procedure:   4  - Current pain level:  6 when lying down, pain is a little better with sitting, and best with standing.     More painful now than before the procedure.     - Pain location: right low back, buttocks. Lateral to the perineum.   - Pain description: aching and burning denies n/t  - Current prescription pain medications/dosing: denies any pain medications. Requesting to try motrin.     - LOV: 10/16/2024 Horacio Sadler MD    - NOV: 7/8/2025 Horacio Sadler MD   - Plan from LOV: none.     RN educated pt according to Dr. Sadler's post procedure care instructions: encouraged pt to ice for 15 minutes every hour, educated on taking tylenol for relief, and emphasized the importance of increasing activity as tolerated and completing ROM exercises.     RN discussed common side effects and what to look out for after injection as detailed below.  RN explained to patient that flare ups are common, however an update will be sent to on-call provider for any further recommendations. Patient verbalized understanding to all education and agreed to call back if symptoms worsen or two weeks post injection.       After an injection, your usual pain should gradually decrease in 2-3 days, but relief may take up to two weeks after your procedure. A temporary  flare-up (or increase) of pain for 1-3 days after a procedure is also normal.     Common side effects include: numbness for 4-8 hours due to local anesthetic, minor pain at injection site, and a small amount of bleeding at injection site.     If a steroid medication was used, side effects may include: flushing/redness of the face, headache that is non-positional (resolves within 24 hours), trouble sleeping & sleepiness, anxiety,  indigestion, increased appetite, increased blood sugar, decreased blood pressure, or a low grade fever (less than 100F).     Headaches:  Lie down as much as possible for the first 24 hours  You can take up to 3000mg of Tylenol per day in doses of 1000mg every 8 hours as needed  Increase fluid and caffeine intake for the next 24-48 hours  If you experience a headache that worsens when standing & improves while laying down, continue to have headaches after 24 hours following the procedure, or experience severe headaches please contact the clinic immediately. If you cannot reach your physician, please go to the nearest emergency room. If you experience a positional headache a couple of days after the procedure - lay flat for a couple of hours while drinking caffeine. If headache remains worse when standing, contact the clinic immediately or go to the nearest emergency room.     All side effects should dissipate within 1 to 3 days after the procedure.     Contact [facility] ([phone], option #2) immediately if you experience any of the following: fever over 100F, chills, drainage, excessive swelling or redness at the injection site, bowel or bladder incontinence, new weakness, positional headache, or new severe pain.   If you cannot reach your physician, please go to the nearest emergency room.

## 2025-07-08 ENCOUNTER — OFFICE VISIT (OUTPATIENT)
Dept: PHYSICAL MEDICINE AND REHAB | Facility: CLINIC | Age: 70
End: 2025-07-08
Payer: MEDICARE

## 2025-07-08 VITALS — HEIGHT: 64 IN | BODY MASS INDEX: 25.95 KG/M2 | WEIGHT: 152 LBS

## 2025-07-08 DIAGNOSIS — G25.0 ESSENTIAL TREMOR: ICD-10-CM

## 2025-07-08 DIAGNOSIS — L40.50 PSORIATIC ARTHRITIS (HCC): ICD-10-CM

## 2025-07-08 DIAGNOSIS — M35.00 SJOGREN'S SYNDROME, WITH UNSPECIFIED ORGAN INVOLVEMENT (HCC): ICD-10-CM

## 2025-07-08 DIAGNOSIS — M54.12 CERVICAL RADICULOPATHY: ICD-10-CM

## 2025-07-08 DIAGNOSIS — M51.9 LUMBAR DISC DISEASE: ICD-10-CM

## 2025-07-08 DIAGNOSIS — M54.16 LUMBAR RADICULOPATHY: Primary | ICD-10-CM

## 2025-07-08 DIAGNOSIS — R10.2 PELVIC PAIN: ICD-10-CM

## 2025-07-08 DIAGNOSIS — M16.0 PRIMARY OSTEOARTHRITIS OF BOTH HIPS: ICD-10-CM

## 2025-07-08 DIAGNOSIS — M46.1 ARTHRITIS OF SACROILIAC JOINT: ICD-10-CM

## 2025-07-08 DIAGNOSIS — K21.00 GASTROESOPHAGEAL REFLUX DISEASE WITH ESOPHAGITIS WITHOUT HEMORRHAGE: ICD-10-CM

## 2025-07-08 DIAGNOSIS — M50.90 CERVICAL DISC DISEASE: ICD-10-CM

## 2025-07-08 DIAGNOSIS — M54.12 CERVICAL NEURITIS: ICD-10-CM

## 2025-07-08 DIAGNOSIS — F41.9 ANXIETY: ICD-10-CM

## 2025-07-08 PROCEDURE — 99214 OFFICE O/P EST MOD 30 MIN: CPT | Performed by: PHYSICAL MEDICINE & REHABILITATION

## 2025-07-08 NOTE — PROGRESS NOTES
Low Back Pain [facility]    Chief Complaint:   Chief Complaint   Patient presents with    Follow - Up     LOV 10/16/24 INJ 06/20/25 Patient is here to follow up after injection and medication. Patient reports 80% improvement and states the pain is now in the SI joint that is a constant ache and worsens at night, Pain 5/10. Denies N/T. Denies Weakness. Denies HEP. Admits Motrin and Tylenol taken PRN.      Nursing note reviewed and verified.   She was last seen on 10/16/2024.  On 11/1/2024, I did the left C7-T1 ILESI.  On 6/20/2025, I did the right L5 TFESI.    History of Present Illness  Debbie Peterson is a 69 year old female with Hashimoto's thyroiditis, Sjogren's syndrome, and psoriatic arthritis who presents with persistent pain and weakness following steroid injections.    She has experienced a challenging year with multiple symptoms beginning in June of the previous year, including diarrhea, tachycardia, and hand tremors. Her history of Hashimoto's thyroiditis, diagnosed in her early thirties, led to significant hair loss, prompting her to request thyroid function tests, which revealed abnormal thyroid function. Her thyroid levels have stabilized in the past ten days, and related symptoms have improved.    In December, she experienced a flare of her autoimmune conditions, including Sjogren's syndrome and newly diagnosed psoriatic arthritis, requiring high-dose prednisone treatment for three and a half months. Since discontinuing prednisone, she reports increased sedation and fatigue. She is scheduled for cortisol and ACTH testing in two weeks.    In November, she received a C7-T1 interlaminar epidural steroid injection, which provided 90% relief of her neck and upper body pain. However, she was unable to continue physical therapy due to a flare of her autoimmune conditions, which affected her ability to  resistance bands and weights. She reports residual tenderness in the neck but significant improvement in  pain radiating to her teeth and ear.    In June, she underwent a right L5 transforaminal epidural steroid injection, which initially exacerbated her low back pain for three to four days before providing over 80% relief. She notes persistent dull, constant pain in the right buttock, which worsens with sitting and lying down, and is most painful when lying on her back or side. She experiences difficulty with adducting her right leg and weakness in the right leg flexion and internal rotation, particularly when standing or pivoting.    She has a history of sacroiliitis from six years ago and suspects some of her current symptoms may be related to this condition. No numbness or tingling in her legs but continues to experience weakness in the right leg, affecting her ability to adduct and internally rotate the leg and flex at the hip.       Description of the Pain - Lumbar  The pain is located in the right buttock.    The pain does not radiate..  The pain at its best is 4/10. The pain at its worst is 6/10. The pain is currently  4/10.  The pain is described as a(n) dull sensation.    The pain is worse sitting, going from sitting to standing, and laying on her back or either side.    The pain is better standing.  There is no numbness.  There is no tingling in the legs.  There is weakness in the right leg.     Past Medical History   Past Medical History[1]    Past Surgical History   Past Surgical History[2]    Family History   Family History[3]    Social History   Social History     Socioeconomic History    Marital status: Single     Spouse name: Not on file    Number of children: 0    Years of education: Not on file    Highest education level: Not on file   Occupational History    Occupation: psychiatrist     Comment: retired   Tobacco Use    Smoking status: Never    Smokeless tobacco: Never   Vaping Use    Vaping status: Never Used   Substance and Sexual Activity    Alcohol use: Not Currently    Drug use: Never    Sexual  activity: Not Currently   Other Topics Concern     Service Not Asked    Blood Transfusions No    Caffeine Concern Yes     Comment: tea, 1 cup    Occupational Exposure Not Asked    Hobby Hazards Not Asked    Sleep Concern Not Asked    Stress Concern Not Asked    Weight Concern Not Asked    Special Diet Not Asked    Back Care Not Asked    Exercise No    Bike Helmet Not Asked    Seat Belt Not Asked    Self-Exams Not Asked   Social History Narrative    Lives alone    Feels safe    No hx of abuse     Social Drivers of Health     Food Insecurity: No Food Insecurity (7/6/2022)    Received from [facility]    Food Insecurity     Currently or in the past 3 months, have you worried your food would run out before you had money to buy more?: No     In the past 12 months, have you run out of food or been unable to get more?: No   Transportation Needs: Unmet Transportation Needs (7/6/2022)    Received from [facility]    Transportation Needs     Currently or in the past 3 months, has lack of transportation kept you from medical appointments, getting food or medicine, or providing care to a family member?: Yes     Has the lack of transportation kept you from meetings, work, or from getting things needed for daily living?: Yes     Medical Transportation Needs?: Yes     Daily Living Transportation Needs? [Peds Only] : Yes   Stress: Not on file   Housing Stability: Low Risk  (7/15/2023)    Received from [facility]    Housing Stability     Mortgage Payment Concerns?: Not on file     Number of Places Lived in the Last Year: Not on file     Unstable Housing?: Not on file       PE:  The patient does appear in her stated age in no distress.  The patient is well groomed.    Psychiatric:  The patient is alert and oriented x 3.  The patient has a normal affect and mood.      Respiratory:  No acute respiratory distress. Patient does not have a cough.    HEENT:  Extraocular  muscles are intact. There is no kern icterus. Pupils are equal, round, and reactive to light. No redness or discharge bilaterally.    Skin:  There are no rashes or lesions.    Lymph Nodes:  The patient has no palpable submandibular, supraclavicular, and cervical lymph nodes..    Vitals:  There were no vitals filed for this visit.    Cervical Spine:    Posture: mild chin forward superiorly rotated protracted shoulder posture.   Shoulders: Level   Head: In neutral     Gait:    Gait: Normal gait   Sit to Stand: no difficulty      Lumbar Spine:    Scoliosis: No scoliosis present     Lumbar Spine Palpation:    Spinous Processes: Non-tender for all Spinous Processes   Z-joints: Tender at  right L5-S1 which is slight   SIJ: Tender at right > left SIJ   Piriformis Muscle: Non-tender bilateral Piriformis muscles   Greater Trochanteric Bursa: Non-tender for bilateral Greater Trochanteric Bursa     Vascular lower extremity:   Dorsalis pedis pulse-RIGHT 2+   Dorsalis pedis pulse-LEFT 2+   Tibialis posterior pulse-RIGHT 2+   Tibialis posterior pulse-LEFT 2+     Neurological Lower Extremity:    Light Touch Sensation: Intact in bilateral Lower Extremities   LE Muscle Strength: All LE strength measurements 5/5 except:  Hamstring RIGHT:   4-/5  Hamstring LEFT:   4/5   RIGHT plantar reflexes: downward response   LEFT plantar reflexes: downward response   Reflexes: 2+ in bilateral lower extremities     Hip: Hips are stable.    RIGHT hip ROM normal   LEFT hip ROM normal   Right hip flexion Positive right buttock pain   LEFT hip flexion Negative pain   RIGHT hip RUIZ test Positive for right buttock pain   LEFT hip RUIZ test Negative for pain   RIGHT hip internal rotation Positive for right buttock pain   LEFT hip internal rotation Positive for right buttock pain     Neural Tension Tests Lumbar Spine:  Supine straight leg raise-RIGHT Positive for right posterior leg pain at 50 degrees   Supine straight leg raise-LEFT Positive for left  posterior leg pain at 70 degrees which is a stretching     SIJ Tests:  Right side positive for thrust, distraction, compression, and Clif o+one Finger test.  These are all negative on the left.    Assessment  1. right L3-4 radiculopathy clinically and chronic right L5 radiculopathy on EMG and left L5-S1 radiculopathy clinically    2. L5-S1 right mild foraminal bulging disc    3. left C7-8 radiculopathy    4. Cervical neuritis    5. C6-7 left > right mild-mod diffuse, C5-6 mild-mod central, C4-5 mild central, C3-4 left mild foraminal bulging discs    6. Essential tremor    7. Anxiety    8. Gastroesophageal reflux disease with esophagitis without hemorrhage    9. Primary osteoarthritis of both hips: mild    10. Pelvic pain    11. Psoriatic arthritis (HCC)    12. Arthritis of sacroiliac joint: bilateral mild    13. Sjogren's syndrome, with unspecified organ involvement ([facility])         Plan  Assessment & Plan  Chronic right-sided low back pain with right-sided sciatica  80% improvement post-right L5 transforaminal epidural steroid injection. Persistent dull right buttock pain, possibly sacroiliitis or incomplete epidural relief.  - Initiate physical therapy focusing on SI joint and back after cortisol levels are normalized.    Sacroiliitis  Suspected sacroiliitis contributing to right buttock pain. Differential includes SI joint inflammation or residual nerve irritation.  - Initiate physical therapy focusing on SI joint and back after cortisol levels are normalized.    Motor weakness of right leg  Persistent motor weakness in right leg, likely related to spinal issues or cortisol imbalance.  - Reassess motor weakness after cortisol levels are normalized.    Psoriatic arthritis  Recent diagnosis with synovitis and tendonitis, pain in hands and elbows.    Sjogren's syndrome  Recent flare managed with high-dose prednisone. Current fatigue and sedation post-prednisone.    Hashimoto's thyroiditis  Recent exacerbation leading  to hyperthyroidism and subsequent hypothyroidism. Symptoms included hair loss, tachycardia, and fatigue. Thyroid function tests now normal.    Hypothyroidism  Recent hypothyroidism post-hyperthyroidism treatment. Symptoms included fatigue and weakness. Thyroid function tests now normal.    Return in about 3 months (around 10/8/2025) for assessment after therapy.     I mirlande hold on doing a repeat right L5 TFESI or a right SIJ injection until I see how she does with the PT.    Patient should call prior to next visit if new or worsening symptoms.     The patient understands and agrees with the stated plan.  Horacio Sadler MD  2025           [1]   Past Medical History:   Arthritis    Autoimmune disorder (HCC)    Lupus    Cervical disc displacement    C4-C5, C5-C6 disc displacement    Chronic pain    Right foot after  foot surgery    Depression    Daughter  (adopted)    Difficulty urinating    Hearing impaired person, bilateral    Hiatal hernia with gastroesophageal reflux    Hypothyroidism    Infertility    Interstitial cystitis    Long COVID    Lupus    Migraine    Migraines    Peripheral neuropathy    PONV (postoperative nausea and vomiting)    Reactive depression (situational)    when daughter     Scalp psoriasis    SI (sacroiliac) joint dysfunction    Sjogren's disease (HCC)    Traumatic brain injury (HCC)    Tremor    Trigeminal neuralgia pain    Vaginal stricture    Vasculitis    nose and sinus    Vertigo   [2]   Past Surgical History:  Procedure Laterality Date    Arthroscopy, shoulder, surgical; w/rotator cuff repair Right     subclavian reconstruction      Back surgery      Biopsy of skin lesion  2020    Cataract extraction Bilateral     Colonoscopy  2012    Correct bunion,simple Right     Egd  2012    Foot fracture surgery Right     Hysteroscopy,diagnostic  2024    endometrial polypectomy    Other surgical history      heel: sural nerve neuroma excision --  achilles tendon accidentally injured during surgery   [3]   Family History  Problem Relation Age of Onset    Hypertension Mother     Obesity Mother     Depression Mother     Dementia Father         dementia induced by MRSA sepsis (cause of death)    Infectious Disease Father         MRSA sepsis (cause of death)    Fibromyalgia Sister     Alcohol and Other Disorders Associated Brother         alcoholism    Depression Brother     Other (drug use) Brother     Breast Cancer Maternal Aunt 65    Depression Other         family h/o    Ovarian Cancer Neg     Colon Cancer Neg

## 2025-07-08 NOTE — PROGRESS NOTES
The following individual(s) verbally consented to be recorded using ambient AI listening technology and understand that they can each withdraw their consent to this listening technology at any point by asking the clinician to turn off or pause the recording:    Patient name: Debbie Peterson  Additional names:

## 2025-07-21 DIAGNOSIS — F40.240 CLAUSTROPHOBIA: ICD-10-CM

## 2025-07-21 DIAGNOSIS — G25.0 ESSENTIAL TREMOR: ICD-10-CM

## 2025-07-21 DIAGNOSIS — F41.9 ANXIETY: ICD-10-CM

## 2025-07-21 RX ORDER — LORAZEPAM 1 MG/1
1 TABLET ORAL 3 TIMES DAILY PRN
Qty: 90 TABLET | Refills: 0 | Status: SHIPPED | OUTPATIENT
Start: 2025-07-21

## 2025-07-21 NOTE — TELEPHONE ENCOUNTER
Dr Spencer,    Please advise. Referral pended for your review. Thank you      Patient calling (verified full name and date of birth).  Requested refill on her Lorazepam  Has 1 day left and will then be out

## 2025-07-29 ENCOUNTER — TELEPHONE (OUTPATIENT)
Dept: NEUROLOGY | Facility: CLINIC | Age: 70
End: 2025-07-29

## 2025-07-29 DIAGNOSIS — G25.0 ESSENTIAL TREMOR: Primary | ICD-10-CM

## 2025-08-04 ENCOUNTER — OFFICE VISIT (OUTPATIENT)
Facility: CLINIC | Age: 70
End: 2025-08-04

## 2025-08-04 VITALS
BODY MASS INDEX: 25.95 KG/M2 | WEIGHT: 152 LBS | HEART RATE: 88 BPM | SYSTOLIC BLOOD PRESSURE: 100 MMHG | DIASTOLIC BLOOD PRESSURE: 80 MMHG | OXYGEN SATURATION: 97 % | HEIGHT: 64 IN

## 2025-08-04 DIAGNOSIS — M46.1 ARTHRITIS OF SACROILIAC JOINT: ICD-10-CM

## 2025-08-04 DIAGNOSIS — K44.9 HIATAL HERNIA: ICD-10-CM

## 2025-08-04 DIAGNOSIS — E03.9 ACQUIRED HYPOTHYROIDISM: ICD-10-CM

## 2025-08-04 DIAGNOSIS — R92.30 DENSE BREAST: ICD-10-CM

## 2025-08-04 DIAGNOSIS — F41.9 ANXIETY: ICD-10-CM

## 2025-08-04 DIAGNOSIS — M54.12 CERVICAL NEURITIS: ICD-10-CM

## 2025-08-04 DIAGNOSIS — E55.9 VITAMIN D DEFICIENCY: ICD-10-CM

## 2025-08-04 DIAGNOSIS — N95.0 POSTMENOPAUSAL BLEEDING: ICD-10-CM

## 2025-08-04 DIAGNOSIS — G43.709 CHRONIC MIGRAINE W/O AURA W/O STATUS MIGRAINOSUS, NOT INTRACTABLE: ICD-10-CM

## 2025-08-04 DIAGNOSIS — M54.12 CERVICAL RADICULOPATHY: ICD-10-CM

## 2025-08-04 DIAGNOSIS — G89.29 CHRONIC RIGHT-SIDED LOW BACK PAIN WITH RIGHT-SIDED SCIATICA: ICD-10-CM

## 2025-08-04 DIAGNOSIS — K21.00 GASTROESOPHAGEAL REFLUX DISEASE WITH ESOPHAGITIS WITHOUT HEMORRHAGE: ICD-10-CM

## 2025-08-04 DIAGNOSIS — M50.90 CERVICAL DISC DISEASE: ICD-10-CM

## 2025-08-04 DIAGNOSIS — R53.83 FATIGUE, UNSPECIFIED TYPE: ICD-10-CM

## 2025-08-04 DIAGNOSIS — N39.3 STRESS INCONTINENCE: ICD-10-CM

## 2025-08-04 DIAGNOSIS — R93.89 THICKENED ENDOMETRIUM: ICD-10-CM

## 2025-08-04 DIAGNOSIS — L40.50 PSORIATIC ARTHRITIS (HCC): ICD-10-CM

## 2025-08-04 DIAGNOSIS — R10.2 PELVIC PAIN: ICD-10-CM

## 2025-08-04 DIAGNOSIS — M16.0 PRIMARY OSTEOARTHRITIS OF BOTH HIPS: ICD-10-CM

## 2025-08-04 DIAGNOSIS — M35.00 SJOGREN'S SYNDROME, WITH UNSPECIFIED ORGAN INVOLVEMENT (HCC): ICD-10-CM

## 2025-08-04 DIAGNOSIS — M54.16 LUMBAR RADICULOPATHY: ICD-10-CM

## 2025-08-04 DIAGNOSIS — M32.8 OTHER FORMS OF SYSTEMIC LUPUS ERYTHEMATOSUS, UNSPECIFIED ORGAN INVOLVEMENT STATUS (HCC): ICD-10-CM

## 2025-08-04 DIAGNOSIS — M51.9 LUMBAR DISC DISEASE: ICD-10-CM

## 2025-08-04 DIAGNOSIS — M54.41 CHRONIC RIGHT-SIDED LOW BACK PAIN WITH RIGHT-SIDED SCIATICA: ICD-10-CM

## 2025-08-04 DIAGNOSIS — Z00.00 ENCOUNTER FOR ANNUAL HEALTH EXAMINATION: Primary | ICD-10-CM

## 2025-08-04 DIAGNOSIS — G25.0 ESSENTIAL TREMOR: ICD-10-CM

## 2025-08-04 DIAGNOSIS — Z12.11 COLON CANCER SCREENING: ICD-10-CM

## 2025-08-04 DIAGNOSIS — Z12.31 BREAST CANCER SCREENING BY MAMMOGRAM: ICD-10-CM

## 2025-08-04 PROBLEM — M25.50 PAIN IN JOINT, MULTIPLE SITES: Status: RESOLVED | Noted: 2021-08-09 | Resolved: 2025-08-04

## 2025-08-04 PROBLEM — M54.2 NECK PAIN: Status: RESOLVED | Noted: 2024-08-05 | Resolved: 2025-08-04

## 2025-08-11 ENCOUNTER — PATIENT MESSAGE (OUTPATIENT)
Dept: NEUROLOGY | Facility: CLINIC | Age: 70
End: 2025-08-11

## 2025-08-20 ENCOUNTER — HOSPITAL ENCOUNTER (OUTPATIENT)
Dept: MRI IMAGING | Facility: HOSPITAL | Age: 70
Discharge: HOME | End: 2025-08-20
Attending: Other

## 2025-08-20 DIAGNOSIS — G25.0 ESSENTIAL TREMOR: ICD-10-CM

## 2025-08-20 PROCEDURE — 70551 MRI BRAIN STEM W/O DYE: CPT | Performed by: OTHER

## 2025-08-28 ENCOUNTER — OFFICE VISIT (OUTPATIENT)
Dept: NEUROLOGY | Facility: CLINIC | Age: 70
End: 2025-08-28

## 2025-08-28 ENCOUNTER — PATIENT MESSAGE (OUTPATIENT)
Facility: CLINIC | Age: 70
End: 2025-08-28

## 2025-08-28 DIAGNOSIS — G43.709 CHRONIC MIGRAINE W/O AURA W/O STATUS MIGRAINOSUS, NOT INTRACTABLE: Primary | ICD-10-CM

## 2025-08-28 DIAGNOSIS — R53.83 PROFOUND FATIGUE: Primary | ICD-10-CM

## 2025-08-28 PROCEDURE — G2211 COMPLEX E/M VISIT ADD ON: HCPCS | Performed by: OTHER

## 2025-08-28 PROCEDURE — 99214 OFFICE O/P EST MOD 30 MIN: CPT | Performed by: OTHER

## 2025-08-28 RX ORDER — GABAPENTIN 100 MG/1
CAPSULE ORAL
Qty: 270 CAPSULE | Refills: 1 | Status: SHIPPED | OUTPATIENT
Start: 2025-08-28

## 2025-08-28 RX ORDER — GALCANEZUMAB 120 MG/ML
120 INJECTION, SOLUTION SUBCUTANEOUS
Qty: 1 EACH | Refills: 11 | Status: SHIPPED | OUTPATIENT
Start: 2025-08-28 | End: 2026-08-28

## (undated) DIAGNOSIS — M25.50 PAIN IN JOINT, MULTIPLE SITES: ICD-10-CM

## (undated) DEVICE — SOLUTION IRRIG 3000ML 0.9% NACL FLX CONT

## (undated) DEVICE — GLOVE SUR 6.5 SENSICARE PI MIC PIP CRM PWD F

## (undated) DEVICE — SOFT TISSUE SHAVER MINI: Brand: TRUCLEAR

## (undated) DEVICE — KIT HYSTEROSCOPIC PROC INCL INFLO OUTFLO TB

## (undated) DEVICE — ELITE HYSTEROSCOPE SEAL: Brand: TRUCLEAR

## (undated) DEVICE — CANISTER SUCT 3000CC HI FLO DISP FOR FLD MGMT

## (undated) DEVICE — GLOVE SUR 7 SENSICARE PI MIC PIP CRM PWD F

## (undated) DEVICE — HYSTEROSCOPY: Brand: MEDLINE INDUSTRIES, INC.

## (undated) NOTE — LETTER
[facility]  [address] [address]  [phone]             REFERRAL ORDER         Patient Name:   Debbie Peterson, ([id number])   Sex: female  : 1955       Order Date:  2024  Authorizing Provider:   PARAMJIT WEISS     Procedure:  PHYSICAL THERAPY EXTERNAL [[id number]]         Order #:   [id number]  Qty:  1     Priority:  Routine                   Class:   Ext - RFL     Standing Interval:          Standing Occurrences:          Expires on:            Expected by:    Associated DX:  Lumbar radiculopathy (M54.16)  Lumbar disc disease (M51.9)     Order summary:  Physical Therapy Area of Concentration: Ortho Physical Therapy Ortho: Spine Comments: L5-S1 right mild foraminal bulging disc  (primary encounter diagnosis) right L3-4 radiculopathy clinically and chronic right L5 radiculopathy on EMG Sjogren's syndr  ome, with unspecified organ involvement (HCC) Chronic migraine w/o aura w/o status migrainosus, not intractable left C7 radiculopathy Cervical disc disease: C6-7 Gastroesophageal reflux disease with esophagitis without hemorrhage Arthritis of sacroiliac   joint: bilateral mild Chronic right-sided low back pain with right-sided sciatica   2-3 times/week for 4-6 weeks Lumbar Nivia protocol with neural mobilization and advance to stabilization.  HEP, Ext - RFL, Routine, Referral to facility - [facility]  L THERAPY Referral By - PARAMJIT WEISS 1 visit            Comments:  Physical Therapy Area of Concentration: Ortho  Physical Therapy Ortho: Spine  Comments: L5-S1 right mild foraminal bulging disc  (primary encounter diagnosis)  right L3-4 radiculopathy clinically and chronic right L5 radiculopathy on EMG  Sjogren's syndrome, with unspecified organ involvement (HCC)  Chronic migraine w/o aura w/o status migrainosus, not intractable  left C7 radiculopathy  Cervical disc disease: C6-7  Gastroesophageal reflux disease with esophagitis  without hemorrhage  Arthritis of sacroiliac joint: bilateral mild  Chronic right-sided low back pain with right-sided sciatica     2-3 times/week for 4-6 weeks  Lumbar Nivia protocol with neural mobilization and advance to stabilization.  HEP           Scheduling Instructions:  **REFERRAL REQUEST**     Your physician has referred you to a specialist.  Your physician or the clinic staff will provide you with the phone number you should call to schedule your appointment.      If you are confident that your benefit plan will not require a referral or authorization, such as [place] Medicaid, please feel free to schedule your appointment immediately. However, if you are unsure about the requirements for authorization, please wait 5-7 days and then contact your physician's office. At that time, you will be provided with any authorization numbers or be assured that none are required. You can then schedule your appointment. Failure to obtain required authorization numbers can create reimbursement difficulties for you.     SIGNED ORDER ON FILE  Authorizing Provider: Horacio Sadler MD   Date: May 21, 2024 at 10:15 AM  Ordering Department: [facility]

## (undated) NOTE — LETTER
Debbie Peterson, :1955    CONSENT FOR PROCEDURE/SEDATION    1. I authorize the performance upon Debbie Peterson  the following: {[facility] OFFICE PROCEDURES:2148}    2. I authorize Dr. Rosmery Morfin MD (and whomever is designated as the doctor’s assistant), to perform the above-mentioned procedures.    3. If any unforeseen conditions arise during this procedure calling for additional  procedures, operations, or medications (including anesthesia and blood transfusion), I further request and authorize the doctor to do whatever he/she deems advisable in my interest.    4. I consent to the taking and reproduction of any photographs in the course of this procedure for professional purposes.    5. I consent to the administration of such sedation as may be considered necessary or advisable by the physician responsible for this service, with the exception of ______________________________________________________    6. I have been informed by my doctor of the nature and purpose of this procedure sedation, possible alternative methods of treatment, risk involved and possible complications.    7. If I have a Do Not Resuscitate (DNR) order in place, the physician and I (or the individual authorized to consent on my behalf) will discuss and agree as to whether the Do Not Resuscitate (DNR) order will remain in effect or will be discontinued during the performance of the procedure and the applicable recovery period. If the Do Not Resuscitate (DNR) order is discontinued and is to be reinstated following the procedure/recovery period, the physician will determine when the applicable recovery period ends for purposes of reinstating the Do Not Resuscitate (DNR) order.    Signature of Patient:_______________________________________________    Signature of person authorized to consent for patient:  _______________________________________________________________    Relationship to patient:  ____________________________________________    Witness: _________________________________________ Date:___________     Physician Signature: _______________________________ Date:___________

## (undated) NOTE — LETTER
2/1/2024      Horacio Sadler MD  Physical Medicine and Rehabilitation  [address] [place] [address], [address]  [facility] [address]  Dept: [phone]  Dept Fax: [phone]        RE: Consultation for Debbie Peterson        Dear Alessandro Rodríguez MD,    Thank you very much for the opportunity to see your patient.  Attached please find a summary from your patient's recent visit.     I appreciate the chance to take care of your patient with you.  Please feel free to call me with any questions or concerns.    Sincerely,        Horacio Sadler MD  Electronically Signed on 2/1/2024

## (undated) NOTE — LETTER
3/2/2023              Elizabeth Peterson        [facility] [address]         Dear Fior Lane,    [address] [place] [address] records indicate that the tests ordered for you by Sorin Hairston MD  have not been done. OCCULT BLOOD SCRN OP CARD(S) (Order #[id number]) on 9/6/22   If you have, in fact, already completed the tests or you do not wish to have the tests done, please contact our office at [address] [place] [address]. Otherwise, please proceed with the testing. Sincerely,    Sorin Medley.  Vinay Hairston MD  [facility], [address] [address] [phone]

## (undated) NOTE — LETTER
11/4/2024              RE: Debbie Peterson        [facility] [address] [place] [address]         To whom it may concern,    Debbie Peterson is currently a patient under my medical care.  The patient's medical condition makes serving on jury duty inadvisable at this time.  Please excuse them from serving.  If you require additional information please do not hesitate to contact my office.    Juror#: [id number]  Date of Service: 11/26/24    Sincerely,    Joshua Irving MD  [address] [id number] [phone]

## (undated) NOTE — LETTER
Patient: Yulia Jones   YOB: 1955   Date of Visit: 1/24/2022     Dear  Dr. Jenny Rush MD,    Thank you for referring Yulia Jones to my practice. Please find my assessment and plan below.           Chronic cholecystitis  (primary encoun

## (undated) NOTE — LETTER
AUTHORIZATION FOR SURGICAL OPERATION OR OTHER PROCEDURE    1. I hereby authorize Dr. Irving and the [facility] staff assigned to my case to perform the following operation and/or procedure at the [facility]:    _______Occipital Nerve Block_______________________________________________________________________      _______________________________________________________________________________________________    2.  My physician has explained the nature and purpose of the operation or other procedure, possible alternative methods of treatment, the risks involved, and the possibility of complication to me.  I acknowledge that no guarantee has been made as to the result that may be obtained.  3.  I recognize that, during the course of this operation, or other procedure, unforseen conditions may necessitate additional or different procedure than those listed above.  I, therefore, further authorize and request that the above named physician, his/her physician assistants or designees perform such procedures as are, in his/her professional opinion, necessary and desirable.  4.  Any tissue or organs removed in the operation or other procedure may be disposed of by and at the discretion of the [facility] staff and [facility].  5.  I understand that in the event of a medical emergency, I will be transported by local paramedics to [facility] or other hospital emergency department.  6.  I certify that I have read and fully understand the above consent to operation and/or other procedure.    7.  I acknowledge that my physician has explained sedation/analgesia administration to me including the risks and benefits.  I consent to the administration of sedation/analgesia as may be necessary or desirable in the judgement of my physician.    Witness signature: ___________________________________________________ Date:  ______/______/_____                    Time:  ________ A.M.  P.M.       Patient  Name:  ____ARLET BLANCO  [id number] _________________     Patient signature:  ___________________________________________________          Statement of Physician  My signature below affirms that prior to the time of the procedure, I have explained to the patient and/or his/her guardian, the risks and benefits involved in the proposed treatment and any reasonable alternative to the proposed treatment.  I have also explained the risks and benefits involved in the refusal of the proposed treatment and have answered the patient's questions.                        Date:  ______/______/_______  Provider                      Signature:  __________________________________________________________       Time:  ___________ A.M    P.M.

## (undated) NOTE — LETTER
7/8/2025      Horacio Sadler MD  Physical Medicine and Rehabilitation  [address] [place] [address], [address]  [facility] [address]  Dept: [phone]  Dept Fax: [phone]        RE: Consultation for Debbie Peterson        Dear Marina Spencer MD,    Thank you very much for the opportunity to see your patient.  Attached please find a summary from your patient's recent visit.     I appreciate the chance to take care of your patient with you.  Please feel free to call me with any questions or concerns.    Sincerely,        Horacio Sadler MD  Electronically Signed on 7/8/2025

## (undated) NOTE — LETTER
11/29/2024          Debbie Peterson    [address] [place] [id number]         Dear Debbie,    Our records indicate that the tests ordered for you by HAIMDA Davis  have not been done.      C. diff toxigenic PCR (OPT) (Order #[id number]) on 9/12/24       If you have, in fact, already completed the tests or you do not wish to have the tests done, please contact our office at THE NUMBER LISTED BELOW.  Otherwise, please proceed with the testing.           Sincerely,    HAMIDA Davis  [address] [address]  [facility] [address]  [phone]

## (undated) NOTE — LETTER
4/22/2024      Horacio Sadler MD  Physical Medicine and Rehabilitation  [address] [place] [address], [address]  [facility] [address]  Dept: [phone]  Dept Fax: [phone]        RE: Consultation for Debbie Peterson        Dear Andrea Burnham MD,    Thank you very much for the opportunity to see your patient.  Attached please find a summary from your patient's recent visit.     I appreciate the chance to take care of your patient with you.  Please feel free to call me with any questions or concerns.    Sincerely,        Horacio Sadler MD  Electronically Signed on 4/22/2024

## (undated) NOTE — LETTER
April 22, 2020     [address] [place] [address]      Dear Can So:    Below are the results from your recent visit:    Can Beard, there is no evidence of a urinary tract infection in your urine currently that was done the othe

## (undated) NOTE — LETTER
Notifier: [employer]       Patient Name: Debbie Peterson       Identification Number: [id number]                          Advance Beneficiary Notice of Noncoverage (ABN)   NOTE:  If Medicare doesn’t pay for D. Items/service(s) below, you may have to pay.  Medicare does not pay for everything, even some care that you or your health care provider have good reason to think you need. We expect Medicare may not pay for the D. items/service(s) below.  Items or Services  __X__[place] Block Reason Medicare May Not Pay: Estimated Cost   __EKG ($129.00)  __Pap smear ($48.23) __Pelvic/Breast ($65.00)  __ Ear Irrigation ($149)  __ Injection(s)  ___ Tdap ($70)       ___ Meningitis ($206)   __Prevnar ($285)  ___ Td ($51)            ___Shingrix ($215)        __Prevnar 20 ($309)  ___ Hep A ($156)   ___Prolia ($1827.00)     __ Xiaflex ($              )   ___ Hep B ($167)      __Pneumovax ($155)                                            ___ Vaccine Administration ($31)   __ Medicare does not cover this service      _X_ Medicare may not pay for this   item/service for your condition     __ Medicare may not pay for this item/service as often as this        $800   WHAT YOU NEED TO DO NOW:  Read this notice, so you can make an informed decision about your care.  Ask us any questions that you may have after you finish reading.  Choose an option below about whether to receive the D. item/service(s)  listed above.  Note: If you choose Option 1 or 2, we may help you to use any other insurance that you might have, but Medicare cannot require us to do this.  OPTIONS: Check only one box.  We cannot choose a box for you.   OPTION 1. I want the D. item/service(s) listed above. You may ask to be paid now, but I also want Medicare billed for an official decision on payment, which is sent to me on a Medicare Summary Notice (MSN). I understand that if Medicare doesn’t pay, I am responsible for payment, but I can appeal to Medicare by  following the directions on the MSN. If Medicare does pay, you will refund any payments I made to you, less co-pays or deductibles.  OPTION 2. I want the D. item/service(s) listed above, but do not bill Medicare. You may ask to be paid now as I am responsible for payment. I cannot appeal if Medicare is not billed.  OPTION 3. I don't want the D. item/service(s) listed above. I understand with this choice I am not responsible for payment, and I cannot appeal to see if Medicare would pay.    H. Additional Information:    This notice gives our opinion, not an official Medicare decision. If you have other questions on this notice or Medicare billing, call 1-800-MEDICARE (1-404.984.8719/TTY: 1-766.692.2250). Signing below means that you have received and understand this notice. You also receive a copy.  Signature: Date:       You have the right to get Medicare information in an accessible format, like large print, Braille, or audio. You also have the right to file a complaint if you feel you’ve been discriminated against. Visit Medicare.gov/about- us/vczuwxkuuxzso-hwnvscmhjvzicswoo-cgzabw.  According to the Paperwork Reduction Act of 1995, no persons are required to respond to a collection of information unless it displays a valid OMB control number. The valid OMB control number for this information collection is 3802-7574. The time required to complete this information collection is estimated to average 7 minutes per response, including the time to review instructions, search existing data resources, gather the data needed, and complete and review the information collection. If you have comments concerning the accuracy of the time estimate or suggestions for improving this form, please write to: CMS, [facility] [employer]     Delia Attn: KATELYN Reports Clearance Officer, [place], [place] [address].  Form CMS-R-131 (Exp. 1/31/2026) Form Approved OMB No. 5319-4706

## (undated) NOTE — LETTER
7/2/2024      Horacio Sadler MD  Physical Medicine and Rehabilitation  [address] [place] [address], [address]  [facility] [address]  Dept: [phone]  Dept Fax: [phone]        RE: Consultation for Debbie Peterson        Dear Andrea Burnham MD,    Thank you very much for the opportunity to see your patient.  Attached please find a summary from your patient's recent visit.     I appreciate the chance to take care of your patient with you.  Please feel free to call me with any questions or concerns.    Sincerely,        Horacio Sadler MD  Electronically Signed on 7/2/2024

## (undated) NOTE — LETTER
10/16/2024      Horacio Sadler MD  Physical Medicine and Rehabilitation  [address] [place] [address], [address]  [facility] [address]  Dept: [phone]  Dept Fax: [phone]        RE: Consultation for Debbie Peterson        Dear Andrea Candelario MD,    Thank you very much for the opportunity to see your patient.  Attached please find a summary from your patient's recent visit.     I appreciate the chance to take care of your patient with you.  Please feel free to call me with any questions or concerns.    Sincerely,        Horacio Sadler MD  Electronically Signed on 10/16/2024

## (undated) NOTE — LETTER
3/12/2024      Horacio Sadler MD  Physical Medicine and Rehabilitation  [address] [place] [address], [address]  [facility] [address]  Dept: [phone]  Dept Fax: [phone]        RE: Consultation for Debbie Peterson        Dear Alessandro Rodríguez MD,    Thank you very much for the opportunity to see your patient.  Attached please find a summary from your patient's recent visit.     I appreciate the chance to take care of your patient with you.  Please feel free to call me with any questions or concerns.    Sincerely,        Horacio Sadler MD  Electronically Signed on 3/12/2024